# Patient Record
Sex: FEMALE | Race: BLACK OR AFRICAN AMERICAN | Employment: OTHER | URBAN - METROPOLITAN AREA
[De-identification: names, ages, dates, MRNs, and addresses within clinical notes are randomized per-mention and may not be internally consistent; named-entity substitution may affect disease eponyms.]

---

## 2017-01-18 ENCOUNTER — OFFICE VISIT (OUTPATIENT)
Dept: INTERNAL MEDICINE CLINIC | Age: 67
End: 2017-01-18

## 2017-01-18 VITALS
HEIGHT: 64 IN | RESPIRATION RATE: 18 BRPM | OXYGEN SATURATION: 99 % | SYSTOLIC BLOOD PRESSURE: 126 MMHG | HEART RATE: 70 BPM | DIASTOLIC BLOOD PRESSURE: 74 MMHG | BODY MASS INDEX: 36.37 KG/M2 | TEMPERATURE: 98 F | WEIGHT: 213 LBS

## 2017-01-18 DIAGNOSIS — E04.2 MULTINODULAR NON-TOXIC GOITER: ICD-10-CM

## 2017-01-18 DIAGNOSIS — Z23 NEED FOR ZOSTER VACCINE: ICD-10-CM

## 2017-01-18 DIAGNOSIS — Z00.00 MEDICARE ANNUAL WELLNESS VISIT, SUBSEQUENT: Primary | ICD-10-CM

## 2017-01-18 DIAGNOSIS — I10 ESSENTIAL HYPERTENSION: ICD-10-CM

## 2017-01-18 DIAGNOSIS — Z23 NEED FOR PNEUMOCOCCAL VACCINATION: ICD-10-CM

## 2017-01-18 NOTE — PATIENT INSTRUCTIONS
It was a pleasure to see you! As discussed:    I have ordered your age appropriate labs please complete them. You will need to fast 10-12hrs before your appointment. Advance Care Planning  As discussed in your appointment today, Advance Care Planning is an important part of planning for your healthcare future. Discussing your preferences with your family and your care team is a part of good healthcare so that we can be guided by your known values and goals. Our office offers this service at no cost to you. Our Nurse Navigators and certified Respecting Choices ® Facilitators, Janki Huynh and Ray Herrera typically schedule family appointments for this service on Wednesdays. To schedule an Advance Care Planning visit or to receive more information about this service, please call [facility] at [phone] and ask to speak directly to Ivan Downey or [employer]. Complete Tetanus and Shingles vaccine- prescription given. Well Visit, Over 72: Care Instructions  Your Care Instructions  Physical exams can help you stay healthy. Your doctor has checked your overall health and may have suggested ways to take good care of yourself. He or she also may have recommended tests. At home, you can help prevent illness with healthy eating, regular exercise, and other steps. Follow-up care is a key part of your treatment and safety. Be sure to make and go to all appointments, and call your doctor if you are having problems. It's also a good idea to know your test results and keep a list of the medicines you take. How can you care for yourself at home? · Reach and stay at a healthy weight. This will lower your risk for many problems, such as obesity, diabetes, heart disease, and high blood pressure. · Get at least 30 minutes of exercise on most days of the week. Walking is a good choice.  You also may want to do other activities, such as running, swimming, cycling, or playing tennis or team sports. · Do not smoke. Smoking can make health problems worse. If you need help quitting, talk to your doctor about stop-smoking programs and medicines. These can increase your chances of quitting for good. · Protect your skin from too much sun. When you're outdoors from 10 a.m. to 4 p.m., stay in the shade or cover up with clothing and a hat with a wide brim. Wear sunglasses that block UV rays. Even when it's cloudy, put broad-spectrum sunscreen (SPF 30 or higher) on any exposed skin. · See a dentist one or two times a year for checkups and to have your teeth cleaned. · Wear a seat belt in the car. · Limit alcohol to 2 drinks a day for men and 1 drink a day for women. Too much alcohol can cause health problems. Follow your doctor's advice about when to have certain tests. These tests can spot problems early. For men and women  · Cholesterol. Your doctor will tell you how often to have this done based on your overall health and other things that can increase your risk for heart attack and stroke. · Blood pressure. Have your blood pressure checked during a routine doctor visit. Your doctor will tell you how often to check your blood pressure based on your age, your blood pressure results, and other factors. · Diabetes. Ask your doctor whether you should have tests for diabetes. · Vision. Experts recommend that you have yearly exams for glaucoma and other age-related eye problems. · Hearing. Tell your doctor if you notice any change in your hearing. You can have tests to find out how well you hear. · Colon cancer tests. Keep having colon cancer tests as your doctor recommends. You can have one of several types of tests. · Heart attack and stroke risk. At least every 4 to 6 years, you should have your risk for heart attack and stroke assessed.  Your doctor uses factors such as your age, blood pressure, cholesterol, and whether you smoke or have diabetes to show what your risk for a heart attack or stroke is over the next 10 years. · Osteoporosis. Talk to your doctor about whether you should have a bone density test to find out whether you have thinning bones. Also ask your doctor about whether you should take calcium and vitamin D supplements. For women  · Pap test and pelvic exam. You may no longer need a Pap test. Talk with your doctor about whether to stop or continue to have Pap tests. · Breast exam and mammogram. Ask how often you should have a mammogram, which is an X-ray of your breasts. A mammogram can spot breast cancer before it can be felt and when it is easiest to treat. · Thyroid disease. Talk to your doctor about whether to have your thyroid checked as part of a regular physical exam. Women have an increased chance of a thyroid problem. For men  · Prostate exam. Talk to your doctor about whether you should have a blood test (called a PSA test) for prostate cancer. Experts disagree on whether men should have this test. Some experts recommend that you discuss the benefits and risks of the test with your doctor. · Abdominal aortic aneurysm. Ask your doctor whether you should have a test to check for an aneurysm. You may need a test if you ever smoked or if your parent, brother, sister, or child has had an aneurysm. When should you call for help? Watch closely for changes in your health, and be sure to contact your doctor if you have any problems or symptoms that concern you. Where can you learn more? Go to http://roman-kimberly.info/. Enter D611 in the search box to learn more about \"Well Visit, Over 65: Care Instructions. \"  Current as of: July 19, 2016  Content Version: 11.1  © 0347-2592 [employer]. Care instructions adapted under license by [employer] (which disclaims liability or warranty for this information).  If you have questions about a medical condition or this instruction, always ask your healthcare professional. Anita Garcia disclaims any warranty or liability for your use of this information.

## 2017-01-18 NOTE — PROGRESS NOTES
HISTORY OF PRESENT ILLNESS  Eulalio Armijo is a 78 y.o. female. HPI  Cardiovascular Review:  The patient has hypertension and obesity. Diet and Lifestyle: generally follows a low fat low cholesterol diet, generally follows a low sodium diet, follows a diabetic diet regularly, exercises sporadically, nonsmoker, limited due to knee pain  Home BP Monitoring: is not measured at home. Pertinent ROS: taking medications as instructed, no medication side effects noted, no TIA's, no chest pain on exertion, no dyspnea on exertion, no swelling of ankles. Interval History  Saw Periodonist   Saw Orthopedics. SHx: Returned from [place]. This is a Subsequent Medicare Annual Wellness Visit providing Personalized Prevention Plan Services (PPPS) (Performed 12 months after initial AWV and PPPS )    I have reviewed the patient's medical history in detail and updated the computerized patient record. History     Past Medical History   Diagnosis Date    Hypertension     Thyroid nodule      annual eval with US      No past surgical history on file. Current Outpatient Prescriptions   Medication Sig Dispense Refill    triamterene-hydrochlorothiazide (MAXZIDE) 37.5-25 mg per tablet TAKE 1 TABLET BY MOUTH TWICE A  Tab 1    omega-3 fatty acids-vitamin e 1,000 mg cap Take 1 Cap by mouth.  OTHER \"tumeric\"      CALCIUM CARBONATE/VITAMIN D3 (CALCIUM + D PO) Take  by mouth daily.  multivitamin (ONE A DAY) tablet Take 1 Tab by mouth daily.        No Known Allergies  Family History   Problem Relation Age of Onset    Cancer Mother      Colon CA    Hypertension Mother     Heart Disease Father     Hypertension Father     Cancer Sister      Colon CA    Hypertension Sister     Cancer Brother      Esophageal    Hypertension Brother     Cancer Brother      Multiple myeloma    Hypertension Brother     Heart Disease Brother     Hypertension Brother     Hypertension Brother      Social History   Substance Use Topics    Smoking status: Never Smoker    Smokeless tobacco: Never Used    Alcohol use 1.0 oz/week     2 Standard drinks or equivalent per week     Patient Active Problem List   Diagnosis Code    Hypertension I10    Multinodular non-toxic goiter E04.2    Obesity E66.9    Encounter for colonoscopy due to history of adenomatous colonic polyps Z12.11, Z86.010    Change in bowel habits R19.4       Depression Risk Factor Screening:     PHQ 2 / 9, over the last two weeks 1/18/2017   Little interest or pleasure in doing things Not at all   Feeling down, depressed or hopeless Not at all   Total Score PHQ 2 0     Alcohol Risk Factor Screening: On any occasion during the past 3 months, have you had more than 3 drinks containing alcohol? Yes    Do you average more than 7 drinks per week? No      Functional Ability and Level of Safety:     Hearing Loss   none    Activities of Daily Living   Self-care. Requires assistance with: no ADLs    Fall Risk     Fall Risk Assessment, last 12 mths 1/18/2017   Able to walk? Yes   Fall in past 12 months? No     Abuse Screen   Patient is not abused      ROS    Physical Exam   Constitutional: She is oriented to person, place, and time. She appears well-developed and well-nourished. HENT:   Right Ear: External ear normal.   Left Ear: External ear normal.   Mouth/Throat: Oropharynx is clear and moist. No oropharyngeal exudate. Eyes: Conjunctivae are normal. No scleral icterus. Neck: Normal range of motion. Neck supple. No thyromegaly present. Cardiovascular: Normal rate, regular rhythm and normal heart sounds. Exam reveals no gallop and no friction rub. No murmur heard. Pulmonary/Chest: Effort normal and breath sounds normal. No respiratory distress. She has no wheezes. She has no rales. She exhibits no tenderness. Abdominal: Soft. Bowel sounds are normal. She exhibits no distension. There is no tenderness. There is no rebound and no guarding.    Genitourinary: Genitourinary Comments: Not indicated    Musculoskeletal: Normal range of motion. She exhibits edema (LLE trace edema pretibal (chronic, improved) no calf ttp). She exhibits no tenderness. Neurological: She is alert and oriented to person, place, and time. Evaluation of Cognitive Function:  Mood/affect:  happy  Appearance: age appropriate  Family member/caregiver input: n/a         Patient Care Team:  Jennett Lanes, MD as PCP - General (Internal Medicine)    ASSESSMENT and PLAN  Advice/Referrals/Counseling   Education and counseling provided:  Health Maintenance   Topic Date Due    ZOSTER VACCINE AGE 60>  08/02/2009    Pneumococcal 65+ Low/Medium Risk (1 of 2 - PCV13) 08/02/2014    INFLUENZA AGE 9 TO ADULT  08/01/2016    GLAUCOMA SCREENING Q2Y  11/12/2017    MEDICARE YEARLY EXAM  01/19/2018    BREAST CANCER SCRN MAMMOGRAM  09/22/2018    COLONOSCOPY  03/30/2021    DTaP/Tdap/Td series (2 - Td) 04/05/2026    Hepatitis C Screening  Completed    OSTEOPOROSIS SCREENING (DEXA)  Completed       Are appropriate based on today's review and evaluation  End-of-Life planning (with patient's consent)    Assessment/Plan   Katie Holt was seen today for hypertension. Diagnoses and all orders for this visit:    Medicare annual wellness visit, subsequent  -     CBC WITH AUTOMATED DIFF  -     HEMOGLOBIN A1C WITH EAG  -     LIPID PANEL  -     METABOLIC PANEL, COMPREHENSIVE  -     VITAMIN D, 25 HYDROXY    Need for zoster vaccine  -     varicella zoster vacine live (VARICELLA-ZOSTER VACINE LIVE) 19,400 unit/0.65 mL susr injection; 1 Vial by SubCUTAneous route once for 1 dose. Need for pneumococcal vaccination  -     Discontinue: pneumococcal 13 henri conj dip (PREVNAR-13) 0.5 mL syrg injection; 0.5 mL by IntraMUSCular route once for 1 dose. -     pneumococcal 13 henri conj dip (PREVNAR-13) 0.5 mL syrg injection; 0.5 mL by IntraMUSCular route once for 1 dose.     Multinodular non-toxic goiter  -     THYROID PANEL  - TSH 3RD GENERATION    Essential hypertension  -     HEMOGLOBIN A1C WITH EAG  -     LIPID PANEL      Follow-up Disposition: Not on File    Medication risks/benefits/costs/interactions/alternatives discussed with patient. Samantharyan Moscoso  was given an after visit summary which includes diagnoses, current medications, & vitals. she expressed understanding with the diagnosis and plan.

## 2017-01-18 NOTE — ACP (ADVANCE CARE PLANNING)
Advance Care Planning (ACP) Provider Note - Comprehensive     Date of ACP Conversation: 01/18/17  Persons included in Conversation:  patient  Length of ACP Conversation in minutes:  16 minutes    Authorized Decision Maker (if patient is incapable of making informed decisions):    This person is:  More Mendoza (1st) if Mr. Danika Venegas is unavailable then daughter, Jasbir Jacome for ALL Patients with Decision Making Capacity:   Importance of advance care planning, including choosing a healthcare agent to communicate patient's healthcare decisions if patient lost the ability to make decisions, such as after a sudden illness or accident    Review of Existing Advance Directive:  Document unavailable but she will bring it to office     For Serious or Chronic Illness:  Understanding of medical condition      Interventions Provided:  Recommended review of completed ACP document annually or upon change in health status Currently Full Code

## 2017-03-13 ENCOUNTER — HOSPITAL ENCOUNTER (OUTPATIENT)
Dept: LAB | Age: 67
Discharge: HOME | End: 2017-03-13
Payer: MEDICARE

## 2017-03-13 PROCEDURE — 80053 COMPREHEN METABOLIC PANEL: CPT

## 2017-03-13 PROCEDURE — 80061 LIPID PANEL: CPT

## 2017-03-13 PROCEDURE — 85025 COMPLETE CBC W/AUTO DIFF WBC: CPT

## 2017-03-13 PROCEDURE — 84443 ASSAY THYROID STIM HORMONE: CPT

## 2017-03-13 PROCEDURE — 83036 HEMOGLOBIN GLYCOSYLATED A1C: CPT

## 2017-03-13 PROCEDURE — 36415 COLL VENOUS BLD VENIPUNCTURE: CPT

## 2017-03-13 PROCEDURE — 84436 ASSAY OF TOTAL THYROXINE: CPT

## 2017-03-13 PROCEDURE — 82306 VITAMIN D 25 HYDROXY: CPT

## 2017-03-14 LAB
25(OH)D3+25(OH)D2 SERPL-MCNC: 32.1 NG/ML (ref 30–100)
ALBUMIN SERPL-MCNC: 4 G/DL (ref 3.6–4.8)
ALBUMIN/GLOB SERPL: 1.3 {RATIO} (ref 1.2–2.2)
ALP SERPL-CCNC: 90 IU/L (ref 39–117)
ALT SERPL-CCNC: 13 IU/L (ref 0–32)
AST SERPL-CCNC: 15 IU/L (ref 0–40)
BASOPHILS # BLD AUTO: 0 X10E3/UL (ref 0–0.2)
BASOPHILS NFR BLD AUTO: 1 %
BILIRUB SERPL-MCNC: 0.6 MG/DL (ref 0–1.2)
BUN SERPL-MCNC: 15 MG/DL (ref 8–27)
BUN/CREAT SERPL: 18 (ref 11–26)
CALCIUM SERPL-MCNC: 9.3 MG/DL (ref 8.7–10.3)
CHLORIDE SERPL-SCNC: 100 MMOL/L (ref 96–106)
CHOLEST SERPL-MCNC: 181 MG/DL (ref 100–199)
CO2 SERPL-SCNC: 28 MMOL/L (ref 18–29)
CREAT SERPL-MCNC: 0.84 MG/DL (ref 0.57–1)
EOSINOPHIL # BLD AUTO: 0.1 X10E3/UL (ref 0–0.4)
EOSINOPHIL NFR BLD AUTO: 1 %
ERYTHROCYTE [DISTWIDTH] IN BLOOD BY AUTOMATED COUNT: 14 % (ref 12.3–15.4)
EST. AVERAGE GLUCOSE BLD GHB EST-MCNC: 120 MG/DL
FT4I SERPL CALC-MCNC: 2.2 (ref 1.2–4.9)
GLOBULIN SER CALC-MCNC: 3 G/DL (ref 1.5–4.5)
GLUCOSE SERPL-MCNC: 94 MG/DL (ref 65–99)
HBA1C MFR BLD: 5.8 % (ref 4.8–5.6)
HCT VFR BLD AUTO: 39 % (ref 34–46.6)
HDLC SERPL-MCNC: 76 MG/DL
HGB BLD-MCNC: 12.1 G/DL (ref 11.1–15.9)
IMM GRANULOCYTES # BLD: 0 X10E3/UL (ref 0–0.1)
IMM GRANULOCYTES NFR BLD: 0 %
LDLC SERPL CALC-MCNC: 87 MG/DL (ref 0–99)
LYMPHOCYTES # BLD AUTO: 2.2 X10E3/UL (ref 0.7–3.1)
LYMPHOCYTES NFR BLD AUTO: 38 %
MCH RBC QN AUTO: 25.7 PG (ref 26.6–33)
MCHC RBC AUTO-ENTMCNC: 31 G/DL (ref 31.5–35.7)
MCV RBC AUTO: 83 FL (ref 79–97)
MONOCYTES # BLD AUTO: 0.5 X10E3/UL (ref 0.1–0.9)
MONOCYTES NFR BLD AUTO: 9 %
NEUTROPHILS # BLD AUTO: 3 X10E3/UL (ref 1.4–7)
NEUTROPHILS NFR BLD AUTO: 51 %
PLATELET # BLD AUTO: 229 X10E3/UL (ref 150–379)
POTASSIUM SERPL-SCNC: 3.9 MMOL/L (ref 3.5–5.2)
PROT SERPL-MCNC: 7 G/DL (ref 6–8.5)
RBC # BLD AUTO: 4.71 X10E6/UL (ref 3.77–5.28)
SODIUM SERPL-SCNC: 143 MMOL/L (ref 134–144)
T3RU NFR SERPL: 26 % (ref 24–39)
T4 SERPL-MCNC: 8.6 UG/DL (ref 4.5–12)
TRIGL SERPL-MCNC: 92 MG/DL (ref 0–149)
TSH SERPL DL<=0.005 MIU/L-ACNC: 2 UIU/ML (ref 0.45–4.5)
VLDLC SERPL CALC-MCNC: 18 MG/DL (ref 5–40)
WBC # BLD AUTO: 5.8 X10E3/UL (ref 3.4–10.8)

## 2017-03-30 ENCOUNTER — TELEPHONE (OUTPATIENT)
Dept: INTERNAL MEDICINE CLINIC | Age: 67
End: 2017-03-30

## 2017-04-30 NOTE — PROGRESS NOTES
Hi Mrs. Frida Castillo you for completing your labs which show:  Your A1c, marker of prediabetes, has improved. Keep up the good work on optimizing your diet and exercise. The remainder of your labs were normal. Some labs that may have been tested and their explanation are:  Your electrolytes, kidney & liver function (Metabolic Panel)   Anemia, blood cells (CBC)  Thyroid (TSH + T4, T3)  Hormones (prolactin, vitamin D )     Do not hesitate to contact the office if you have any questions or concerns before your next appointment.      Kind regards,   Dr. Kari Velásquez

## 2017-05-18 ENCOUNTER — OFFICE VISIT (OUTPATIENT)
Dept: INTERNAL MEDICINE CLINIC | Age: 67
End: 2017-05-18

## 2017-05-18 VITALS
SYSTOLIC BLOOD PRESSURE: 128 MMHG | HEIGHT: 64 IN | WEIGHT: 214.8 LBS | HEART RATE: 86 BPM | BODY MASS INDEX: 36.67 KG/M2 | DIASTOLIC BLOOD PRESSURE: 80 MMHG | OXYGEN SATURATION: 96 % | TEMPERATURE: 97.4 F | RESPIRATION RATE: 18 BRPM

## 2017-05-18 DIAGNOSIS — E04.2 MULTINODULAR NON-TOXIC GOITER: ICD-10-CM

## 2017-05-18 DIAGNOSIS — E66.9 OBESITY (BMI 30-39.9): ICD-10-CM

## 2017-05-18 DIAGNOSIS — R73.9 HYPERGLYCEMIA: ICD-10-CM

## 2017-05-18 DIAGNOSIS — I10 ESSENTIAL HYPERTENSION: Primary | ICD-10-CM

## 2017-05-18 DIAGNOSIS — K06.9 GUM DISEASE: ICD-10-CM

## 2017-05-18 RX ORDER — AMOXICILLIN 500 MG/1
CAPSULE ORAL
Refills: 0 | COMMUNITY
Start: 2017-05-11 | End: 2017-09-21

## 2017-05-18 NOTE — PATIENT INSTRUCTIONS
It was a pleasure to see you! As discussed:    I have ordered your age appropriate labs please complete them. You will need to fast 10-12hrs before your lab appointment. Complete labs two weeks before your next appointment. High Blood Pressure (BP)  Well controlled today  -Continue to check your BP at home   -Follow a low sodium diet (<1500mg/ day)   -Exercise regularly goal, 150 minutes of cardiovascular exercise/ week  -If your blood pressure is too low (<90/60) or too high (>180/100) or you have any symptoms such as chest pain, dizziness, shortness of breath- seek immediate medical attention. See  Www.health.gov for more information. Three Goals (Big Goal: at least  3-5 lbs weight loss by next visit)  1. Wear fitness tracker, supplement physical activity- goal 03754 steps/ day  2. Decrease carbohydrates to 150-200g/ day. 3. Recommended \"Diets\"  Choose a healthy eating plan that works best for you. Some ideas are:  Mediterranean Diet: [email or web address]  Low Carb Diet: Govtoday.nl  Fast Metabolism Diet: http://4INFO. DabKick/books/the-fast-metabolism-diet/       Mediterranean Diet: Care Instructions  Your Care Instructions  The Mediterranean diet features foods eaten in [place], [place], [place], and other countries that border the [facility]. It emphasizes eating a diet rich in fruits, vegetables, nuts, and high-fiber grains, and limits meat, cheese, and sweets. The Mediterranean diet may:  · Prevent heart disease and lower the risk of a heart attack or stroke. · Prevent type 2 diabetes. · Prevent Alzheimer's disease and other dementia. · Prevent depression. · Prevent Parkinson's disease. This diet contains more fat than other heart-healthy diets.  But the fats are mainly from nuts, unsaturated oils, such as fish oils, olive oil, and certain nut or seed oils (such as canola, soybean, or flaxseed oil). These types of oils may help protect the heart and blood vessels. Follow-up care is a key part of your treatment and safety. Be sure to make and go to all appointments, and call your doctor if you are having problems. It's also a good idea to know your test results and keep a list of the medicines you take. How can you care for yourself at home? What to eat  · Eat a variety of fruits and vegetables each day, such as grapes, blueberries, tomatoes, broccoli, peppers, figs, olives, spinach, eggplant, beans, lentils, and chickpeas. · Eat a variety of whole-grain foods each day, such as oats, brown rice, and whole wheat bread, pasta, and couscous. · Eat fish at least 2 times a week. Try tuna, salmon, mackerel, lake trout, herring, or sardines. · Eat moderate amounts of low-fat dairy products, such as milk, cheese, or yogurt. · Eat moderate amounts of poultry and eggs. · Choose healthy (unsaturated) fats, such as nuts, olive oil and certain nut or seed oils like canola, soybean, and flaxseed. · Limit unhealthy (saturated) fats, such as butter, palm oil, and coconut oil. And limit fats found in animal products, such as meat and dairy products made with whole milk. Try to eat red meat only a few times a month in very small amounts. · Limit sweets and desserts to only a few times a week. This includes sugar-sweetened drinks like soda. The Mediterranean diet may also include red wine with your meal--1 glass each day for women and up to 2 glasses a day for men. Tips for changing your diet  · Dip bread in a mix of olive oil and fresh herbs instead of using butter. · Add avocado slices to your sandwich instead of rodriguez. · Have fish for lunch or dinner instead of red meat. Brush the fish with olive oil, and broil or grill it. · Sprinkle your salad with seeds or nuts instead of cheese.   · Cook with olive or canola oil instead of butter or oils that are high in saturated fat. · Switch from 2% milk or whole milk to 1% or fat-free milk. · Dip raw vegetables in a vinaigrette dressing or hummus instead of dips made from mayonnaise or sour cream.  · Have a piece of fruit for dessert instead of a piece of cake. Try baked apples, or have some dried fruit. Part of the Mediterranean diet is being active. Get at least 30 minutes of exercise on most days of the week. Walking is a good choice. You also may want to do other activities, such as running, swimming, cycling, or playing tennis or team sports. Where can you learn more? Go to http://roman-kimberly.info/. Enter O407 in the search box to learn more about \"Mediterranean Diet: Care Instructions. \"  Current as of: October 21, 2016  Content Version: 11.2  © 6637-8513 [employer]. Care instructions adapted under license by [employer] (which disclaims liability or warranty for this information). If you have questions about a medical condition or this instruction, always ask your healthcare professional. [place] [address] any warranty or liability for your use of this information. Learning About Carbohydrate  What is carbohydrate? Carbohydrate is an important nutrient you get from food. It's a great source of energy for your body and helps your brain and nervous system work properly. How does your body use carbohydrate? After you eat food with carbs in it, your body digests the carbohydrate and turns it into a kind of sugar that goes into your blood. The blood carries this sugar to the cells in your body. The cells use the sugar to give you energy. Extra sugar is stored in the cells for later use. If it isn't used, it turns into fat. Where does carbohydrate come from?   The healthiest carbohydrate choices are breads, cereals, and pastas made with whole grains; brown rice; low-fat dairy products; vegetables; legumes such as peas, lentils, and beans; and fruits. Foods made from refined flour, including bread, pasta, doughnuts, cookies, and desserts, also contain carbohydrate. So do sweets such as candy and soda. How can you get the right kind and amount of carbs? Eating too much of anything can lead to weight gain. And that can lead to other health problems. Here are some tips to help you eat the right amount of the right kind of carbs so you have the nutrition and the energy you need:  · Eat 3 to 8 servings of grains (breads, cereals, rice, pasta) each day. For example, a serving is 1 slice of bread, 1 cup of boxed cereal, or ½ cup of cooked rice, cooked pasta, or cooked cereal. Go to www. choosemyplate.gov to learn how many servings you need. ¨ Buy bread that lists whole wheat (or other whole grains), stone-ground wheat, or cracked wheat as the first ingredient. ¨ Eat brown rice, bulgur, or millet instead of white rice. ¨ Eat pasta and cereals made from whole grain flour instead of refined flour. · Eat several servings a day of fresh fruits and vegetables. These include raspberries, apples, figs, oranges, pears, prunes, broccoli, brussels sprouts, carrots, corn, peas, and beans. And there are lots of other fruits and vegetables to choose from. · Limit the amount of candy, desserts, and soda in your diet. Where can you learn more? Go to http://roman-kimberly.info/. Enter F199 in the search box to learn more about \"Learning About Carbohydrate. \"  Current as of: July 26, 2016  Content Version: 11.2  © 3910-8699 [employer]. Care instructions adapted under license by [employer] (which disclaims liability or warranty for this information). If you have questions about a medical condition or this instruction, always ask your healthcare professional. [address] any warranty or liability for your use of this information.

## 2017-05-18 NOTE — PROGRESS NOTES
HISTORY OF PRESENT ILLNESS  Raya Germain is a 78 y.o. female. HPI  Cardiovascular Review:  The patient has hypertension and obesity Body mass index is 36.87 kg/(m^2). Diet and Lifestyle: not attempting to follow a low sodium diet, nonsmoker  Home BP Monitoring: is not measured at home. Pertinent ROS: taking medications as instructed, no medication side effects noted, no TIA's, no chest pain on exertion, no dyspnea on exertion, swelling of ankles. Multinodular Goiter   Patient is seen by [facility]  for followup of multinodular goiter  Has been seen in the past 3 months  and had TSH checked. US checked. Records reviewed. Thyroid ROS: denies fatigue, weight changes, heat/cold intolerance, bowel/skin changes or CVS symptoms. Shx: Daughter traveling to [place] , [facility] coming to visit 3.5 and 9 yo. Review of Systems   Constitutional: Negative for diaphoresis, fever and weight loss. HENT:        Had gum surgery    Eyes: Negative for blurred vision and pain. Respiratory: Negative for shortness of breath. Cardiovascular: Negative for chest pain, orthopnea and leg swelling. Musculoskeletal: Positive for joint pain (left knee, hard cortisone injection in Fall 2016 ). Neurological: Negative for focal weakness and headaches. Psychiatric/Behavioral: Negative for depression. Patient Active Problem List    Diagnosis Date Noted    Encounter for colonoscopy due to history of adenomatous colonic polyps 03/30/2016    Change in bowel habits 03/30/2016    Obesity 06/15/2015    Hypertension 12/02/2014    Multinodular non-toxic goiter 12/02/2014       Current Outpatient Prescriptions   Medication Sig Dispense Refill    amoxicillin (AMOXIL) 500 mg capsule TAKE ONE CAPSULE BY MOUTH 3 TIMES A DAY FOR 7 DAYS  0    triamterene-hydrochlorothiazide (MAXZIDE) 37.5-25 mg per tablet TAKE 1 TABLET BY MOUTH TWICE A  Tab 1    omega-3 fatty acids-vitamin e 1,000 mg cap Take 1 Cap by mouth.  OTHER \"tumeric\"      CALCIUM CARBONATE/VITAMIN D3 (CALCIUM + D PO) Take  by mouth daily.  multivitamin (ONE A DAY) tablet Take 1 Tab by mouth daily. No Known Allergies   Visit Vitals    /80 (BP 1 Location: Right arm, BP Patient Position: Sitting)    Pulse 86    Temp 97.4 °F (36.3 °C) (Oral)    Resp 18    Ht 5' 4\" (1.626 m)    Wt 214 lb 12.8 oz (97.4 kg)    SpO2 96%    BMI 36.87 kg/m2       Physical Exam    Lab Results  Component Value Date/Time   Hemoglobin A1c 5.8 03/13/2017 09:19 AM   Hemoglobin A1c 6.0 04/06/2016 07:57 AM   Hemoglobin A1c 5.8 01/11/2010 10:53 AM   Glucose 94 03/13/2017 09:19 AM   LDL, calculated 87 03/13/2017 09:19 AM   Creatinine 0.84 03/13/2017 09:19 AM      Lab Results  Component Value Date/Time   Cholesterol, total 181 03/13/2017 09:19 AM   HDL Cholesterol 76 03/13/2017 09:19 AM   LDL, calculated 87 03/13/2017 09:19 AM   Triglyceride 92 03/13/2017 09:19 AM   CHOL/HDL Ratio 2.6 01/11/2010 10:53 AM       Lab Results  Component Value Date/Time   ALT (SGPT) 13 03/13/2017 09:19 AM   AST (SGOT) 15 03/13/2017 09:19 AM   Alk. phosphatase 90 03/13/2017 09:19 AM   Bilirubin, total 0.6 03/13/2017 09:19 AM       Lab Results  Component Value Date/Time   GFR est AA 83 03/13/2017 09:19 AM   GFR est non-AA 72 03/13/2017 09:19 AM   Creatinine 0.84 03/13/2017 09:19 AM   BUN 15 03/13/2017 09:19 AM   Sodium 143 03/13/2017 09:19 AM   Potassium 3.9 03/13/2017 09:19 AM   Chloride 100 03/13/2017 09:19 AM   CO2 28 03/13/2017 09:19 AM      Lab Results  Component Value Date/Time   TSH 2.000 03/13/2017 09:19 AM   T3 Uptake 26 03/13/2017 09:19 AM   T4, Free 1.24 03/20/2015 10:38 AM   T4, Total 8.6 03/13/2017 09:19 AM      Lab Results   Component Value Date/Time    Glucose 94 03/13/2017 09:19 AM         ASSESSMENT and PLAN  Dee Blair was seen today for hypertension, knee swelling and ankle swelling. Diagnoses and all orders for this visit:    Essential hypertension- well controlled. Counseled on diet and exercise. Continue current regimen.   -     METABOLIC PANEL, COMPREHENSIVE  -     LIPID PANEL    Multinodular non-toxic goiter- per Endocrinology     Gum disease- per Periodontist     Hyperglycemia- optimize carbs   -     HEMOGLOBIN A1C WITH EAG    Obesity (BMI 30-39. 9)- I have reviewed/discussed the above normal BMI with the patient. I have recommended the following interventions: dietary management education, guidance, and counseling and encourage exercise . See AVS for full details of plan and patient discussion. .          Follow-up Disposition:  Return in about 4 months (around 9/18/2017) for Follow-up, Hypertension. Medication risks/benefits/costs/interactions/alternatives discussed with patient. Inder Beck  was given an after visit summary which includes diagnoses, current medications, & vitals. she expressed understanding with the diagnosis and plan.

## 2017-05-18 NOTE — MR AVS SNAPSHOT
Visit Information Date & Time Provider Department Dept. Phone Encounter #  
 5/18/2017  8:30 AM Raquel Castro MD [facility] [phone] [id number] Follow-up Instructions Return in about 4 months (around 9/18/2017) for Follow-up, Hypertension. Upcoming Health Maintenance Date Due INFLUENZA AGE 9 TO ADULT 8/1/2017 GLAUCOMA SCREENING Q2Y 11/12/2017 MEDICARE YEARLY EXAM 1/19/2018 Pneumococcal 65+ Low/Medium Risk (2 of 2 - PPSV23) 5/17/2018 BREAST CANCER SCRN MAMMOGRAM 9/22/2018 COLONOSCOPY 3/30/2021 DTaP/Tdap/Td series (2 - Td) 4/5/2026 Allergies as of 5/18/2017  Review Complete On: 5/18/2017 By: Raquel Castro MD  
 No Known Allergies Current Immunizations  Never Reviewed Name Date Influenza High Dose Vaccine PF 10/13/2015 Tdap 4/5/2016 10:30 AM  
  
 Not reviewed this visit You Were Diagnosed With   
  
 Codes Comments Essential hypertension    -  Primary ICD-10-CM: I10 
ICD-9-CM: 401.9 Multinodular non-toxic goiter     ICD-10-CM: E04.2 ICD-9-CM: 241.1 Gum disease     ICD-10-CM: K06.9 ICD-9-CM: 523.9 Hyperglycemia     ICD-10-CM: R73.9 ICD-9-CM: 790.29 Obesity (BMI 30-39. 9)     ICD-10-CM: E66.9 ICD-9-CM: 278.00 Vitals BP Pulse Temp Resp Height(growth percentile) Weight(growth percentile) 128/80 (BP 1 Location: Right arm, BP Patient Position: Sitting) 86 97.4 °F (36.3 °C) (Oral) 18 5' 4\" (1.626 m) 214 lb 12.8 oz (97.4 kg) SpO2 BMI OB Status Smoking Status 96% 36.87 kg/m2 Postmenopausal Never Smoker Vitals History BMI and BSA Data Body Mass Index Body Surface Area  
 36.87 kg/m 2 2.1 m 2 Preferred Pharmacy Pharmacy Name Phone [id number] [place] - [address] [phone] Your Updated Medication List  
  
   
This list is accurate as of: 5/18/17  9:00 AM.  Always use your most recent med list.  
  
  
  
  
 amoxicillin 500 mg capsule Commonly known as:  AMOXIL TAKE ONE CAPSULE BY MOUTH 3 TIMES A DAY FOR 7 DAYS  
  
 CALCIUM + D PO Take  by mouth daily. multivitamin tablet Commonly known as:  ONE A DAY Take 1 Tab by mouth daily. omega-3 fatty acids-vitamin e 1,000 mg Cap Take 1 Cap by mouth. OTHER \"tumeric\"  
  
 triamterene-hydroCHLOROthiazide 37.5-25 mg per tablet Commonly known as:  [facility] 1 TABLET BY MOUTH TWICE A DAY We Performed the Following HEMOGLOBIN A1C WITH EAG [81193 CPT(R)] LIPID PANEL [53253 CPT(R)] METABOLIC PANEL, COMPREHENSIVE [98167 CPT(R)] Follow-up Instructions Return in about 4 months (around 9/18/2017) for Follow-up, Hypertension. Patient Instructions It was a pleasure to see you! As discussed: 
 
I have ordered your age appropriate labs please complete them. You will need to fast 10-12hrs before your lab appointment. Complete labs two weeks before your next appointment. High Blood Pressure (BP) Well controlled today 
-Continue to check your BP at home  
-Follow a low sodium diet (<1500mg/ day) -Exercise regularly goal, 150 minutes of cardiovascular exercise/ week 
-If your blood pressure is too low (<90/60) or too high (>180/100) or you have any symptoms such as chest pain, dizziness, shortness of breath- seek immediate medical attention. See  Www.health.gov for more information. Three Goals (Big Goal: at least  3-5 lbs weight loss by next visit) 1. Wear fitness tracker, supplement physical activity- goal 85764 steps/ day 2. Decrease carbohydrates to 150-200g/ day. 3. Recommended \"Diets\" Choose a healthy eating plan that works best for you. Some ideas are: 
Mediterranean Diet: [email or web address] Low Carb Diet: [email or web address] Fast Metabolism Diet: [email or web address] [email or web address] Software/books/the-fast-metabolism-diet/ 
 
  
Mediterranean Diet: Care Instructions Your Care Instructions The Mediterranean diet features foods eaten in [place], [place], [place] and [place], and other countries that border the . It emphasizes eating a diet rich in fruits, vegetables, nuts, and high-fiber grains, and limits meat, cheese, and sweets. The Mediterranean diet may: · Prevent heart disease and lower the risk of a heart attack or stroke. · Prevent type 2 diabetes. · Prevent Alzheimer's disease and other dementia. · Prevent depression. · Prevent Parkinson's disease. This diet contains more fat than other heart-healthy diets. But the fats are mainly from nuts, unsaturated oils, such as fish oils, olive oil, and certain nut or seed oils (such as canola, soybean, or flaxseed oil). These types of oils may help protect the heart and blood vessels. Follow-up care is a key part of your treatment and safety. Be sure to make and go to all appointments, and call your doctor if you are having problems. It's also a good idea to know your test results and keep a list of the medicines you take. How can you care for yourself at home? What to eat · Eat a variety of fruits and vegetables each day, such as grapes, blueberries, tomatoes, broccoli, peppers, figs, olives, spinach, eggplant, beans, lentils, and chickpeas. · Eat a variety of whole-grain foods each day, such as oats, brown rice, and whole wheat bread, pasta, and couscous. · Eat fish at least 2 times a week. Try tuna, salmon, mackerel, lake trout, herring, or sardines. · Eat moderate amounts of low-fat dairy products, such as milk, cheese, or yogurt. · Eat moderate amounts of poultry and eggs.  
· Choose healthy (unsaturated) fats, such as nuts, olive oil and certain nut or seed oils like canola, soybean, and flaxseed. · Limit unhealthy (saturated) fats, such as butter, palm oil, and coconut oil. And limit fats found in animal products, such as meat and dairy products made with whole milk. Try to eat red meat only a few times a month in very small amounts. · Limit sweets and desserts to only a few times a week. This includes sugar-sweetened drinks like soda. The Mediterranean diet may also include red wine with your meal1 glass each day for women and up to 2 glasses a day for men. Tips for changing your diet · Dip bread in a mix of olive oil and fresh herbs instead of using butter. · Add avocado slices to your sandwich instead of rodriguez. · Have fish for lunch or dinner instead of red meat. Brush the fish with olive oil, and broil or grill it. · Sprinkle your salad with seeds or nuts instead of cheese. · Cook with olive or canola oil instead of butter or oils that are high in saturated fat. · Switch from 2% milk or whole milk to 1% or fat-free milk. · Dip raw vegetables in a vinaigrette dressing or hummus instead of dips made from mayonnaise or sour cream. 
· Have a piece of fruit for dessert instead of a piece of cake. Try baked apples, or have some dried fruit. Part of the Mediterranean diet is being active. Get at least 30 minutes of exercise on most days of the week. Walking is a good choice. You also may want to do other activities, such as running, swimming, cycling, or playing tennis or team sports. Where can you learn more? Go to http://roman-kimberly.info/. Enter O407 in the search box to learn more about \"Mediterranean Diet: Care Instructions. \" Current as of: October 21, 2016 Content Version: 11.2 © 0994-5084 [employer]. Care instructions adapted under license by [employer] (which disclaims liability or warranty for this information).  If you have questions about a medical condition or this instruction, always ask your healthcare professional. [place] [address] any warranty or liability for your use of this information. Learning About Carbohydrate What is carbohydrate? Carbohydrate is an important nutrient you get from food. It's a great source of energy for your body and helps your brain and nervous system work properly. How does your body use carbohydrate? After you eat food with carbs in it, your body digests the carbohydrate and turns it into a kind of sugar that goes into your blood. The blood carries this sugar to the cells in your body. The cells use the sugar to give you energy. Extra sugar is stored in the cells for later use. If it isn't used, it turns into fat. Where does carbohydrate come from? The healthiest carbohydrate choices are breads, cereals, and pastas made with whole grains; brown rice; low-fat dairy products; vegetables; legumes such as peas, lentils, and beans; and fruits. Foods made from refined flour, including bread, pasta, doughnuts, cookies, and desserts, also contain carbohydrate. So do sweets such as candy and soda. How can you get the right kind and amount of carbs? Eating too much of anything can lead to weight gain. And that can lead to other health problems. Here are some tips to help you eat the right amount of the right kind of carbs so you have the nutrition and the energy you need: 
· Eat 3 to 8 servings of grains (breads, cereals, rice, pasta) each day. For example, a serving is 1 slice of bread, 1 cup of boxed cereal, or ½ cup of cooked rice, cooked pasta, or cooked cereal. Go to www. choosemyplate.gov to learn how many servings you need. ¨ Buy bread that lists whole wheat (or other whole grains), stone-ground wheat, or cracked wheat as the first ingredient. ¨ Eat brown rice, bulgur, or millet instead of white rice. ¨ Eat pasta and cereals made from whole grain flour instead of refined flour. · Eat several servings a day of fresh fruits and vegetables. These include raspberries, apples, figs, oranges, pears, prunes, broccoli, brussels sprouts, carrots, corn, peas, and beans. And there are lots of other fruits and vegetables to choose from. · Limit the amount of candy, desserts, and soda in your diet. Where can you learn more? Go to http://roman-kimberly.info/. Enter F199 in the search box to learn more about \"Learning About Carbohydrate. \" Current as of: July 26, 2016 Content Version: 11.2 © 8500-2898 [employer]. Care instructions adapted under license by [employer] (which disclaims liability or warranty for this information). If you have questions about a medical condition or this instruction, always ask your healthcare professional. [address] any warranty or liability for your use of this information. Introducing [facility]! Dear Trixie Pickett: Thank you for requesting a Selltag account. Our records indicate that you already have an active Selltag account. You can access your account anytime at [email or web address]. [email or web address] Did you know that you can access your hospital and ER discharge instructions at any time in Selltag? You can also review all of your test results from your hospital stay or ER visit. Additional Information If you have questions, please visit the Frequently Asked Questions section of the Selltag website at [email or web address] Remember, Selltag is NOT to be used for urgent needs. For medical emergencies, dial 911. Now available from your iPhone and Android! Please provide this summary of care documentation to your next provider. Your primary care clinician is listed as Colon Kory. If you have any questions after today's visit, please call [phone].

## 2017-07-03 RX ORDER — TRIAMTERENE/HYDROCHLOROTHIAZID 37.5-25 MG
TABLET ORAL
Qty: 180 TAB | Refills: 1 | Status: SHIPPED | OUTPATIENT
Start: 2017-07-03 | End: 2018-08-14 | Stop reason: SDUPTHER

## 2017-09-14 ENCOUNTER — HOSPITAL ENCOUNTER (OUTPATIENT)
Dept: LAB | Age: 67
Discharge: HOME | End: 2017-09-14
Payer: MEDICARE

## 2017-09-14 PROCEDURE — 80061 LIPID PANEL: CPT

## 2017-09-14 PROCEDURE — 83036 HEMOGLOBIN GLYCOSYLATED A1C: CPT

## 2017-09-14 PROCEDURE — 36415 COLL VENOUS BLD VENIPUNCTURE: CPT

## 2017-09-14 PROCEDURE — 80053 COMPREHEN METABOLIC PANEL: CPT

## 2017-09-15 LAB
ALBUMIN SERPL-MCNC: 4.1 G/DL (ref 3.6–4.8)
ALBUMIN/GLOB SERPL: 1.3 {RATIO} (ref 1.2–2.2)
ALP SERPL-CCNC: 94 IU/L (ref 39–117)
ALT SERPL-CCNC: 11 IU/L (ref 0–32)
AST SERPL-CCNC: 17 IU/L (ref 0–40)
BILIRUB SERPL-MCNC: 0.5 MG/DL (ref 0–1.2)
BUN SERPL-MCNC: 16 MG/DL (ref 8–27)
BUN/CREAT SERPL: 16 (ref 12–28)
CALCIUM SERPL-MCNC: 9.7 MG/DL (ref 8.7–10.3)
CHLORIDE SERPL-SCNC: 100 MMOL/L (ref 96–106)
CHOLEST SERPL-MCNC: 168 MG/DL (ref 100–199)
CO2 SERPL-SCNC: 29 MMOL/L (ref 18–29)
CREAT SERPL-MCNC: 0.99 MG/DL (ref 0.57–1)
EST. AVERAGE GLUCOSE BLD GHB EST-MCNC: 120 MG/DL
GLOBULIN SER CALC-MCNC: 3.1 G/DL (ref 1.5–4.5)
GLUCOSE SERPL-MCNC: 90 MG/DL (ref 65–99)
HBA1C MFR BLD: 5.8 % (ref 4.8–5.6)
HDLC SERPL-MCNC: 74 MG/DL
LDLC SERPL CALC-MCNC: 80 MG/DL (ref 0–99)
POTASSIUM SERPL-SCNC: 4 MMOL/L (ref 3.5–5.2)
PROT SERPL-MCNC: 7.2 G/DL (ref 6–8.5)
SODIUM SERPL-SCNC: 143 MMOL/L (ref 134–144)
TRIGL SERPL-MCNC: 72 MG/DL (ref 0–149)
VLDLC SERPL CALC-MCNC: 14 MG/DL (ref 5–40)

## 2017-09-21 ENCOUNTER — OFFICE VISIT (OUTPATIENT)
Dept: INTERNAL MEDICINE CLINIC | Age: 67
End: 2017-09-21

## 2017-09-21 VITALS
TEMPERATURE: 97.7 F | WEIGHT: 208.2 LBS | BODY MASS INDEX: 35.55 KG/M2 | SYSTOLIC BLOOD PRESSURE: 136 MMHG | RESPIRATION RATE: 16 BRPM | OXYGEN SATURATION: 98 % | HEIGHT: 64 IN | HEART RATE: 72 BPM | DIASTOLIC BLOOD PRESSURE: 74 MMHG

## 2017-09-21 DIAGNOSIS — E04.2 MULTINODULAR NON-TOXIC GOITER: ICD-10-CM

## 2017-09-21 DIAGNOSIS — I10 ESSENTIAL HYPERTENSION: Primary | ICD-10-CM

## 2017-09-21 DIAGNOSIS — R09.82 PND (POST-NASAL DRIP): ICD-10-CM

## 2017-09-21 NOTE — PROGRESS NOTES
Chief Complaint   Patient presents with    Hypertension     1. Have you been to the ER, urgent care clinic since your last visit? Hospitalized since your last visit? No    2. Have you seen or consulted any other health care providers outside of the [address] [place] [address] since your last visit? Include any pap smears or colon screening.  No

## 2017-09-21 NOTE — PATIENT INSTRUCTIONS
It was a pleasure to see you! As discussed:    Lab review  Your kidney and liver function are normal. No medication changes are needed  Your cholesterol is well controlled. The remainder of your labs were normal. Some labs that may have been tested and their explanation are:  Your electrolytes, kidney & liver function (Metabolic Panel)   Anemia, blood cells (CBC)  Thyroid (TSH + T4, T3)  Hormones (prolactin, vitamin D )   Pregnancy (Beta HCG)    Diabetes (Hemoglobin A1c)     Post nasal drip  -Use nasal saline spray 3-4 times/day   -Start non sedating antihistamine such as Claritin, Allegra or Zyrtec (generic is fine) in the day;     Nutrition Tips for Diabetes: After Your Visit  Your Care Instructions  A healthy diet is important to manage diabetes. It helps you lose weight (if you need to) and keep it off. It gives you the nutrition and energy your body needs and helps prevent heart disease. But a diet for diabetes does not mean that you have to eat special foods. You can eat what your family eats, including occasional sweets and other favorites. But you do have to pay attention to how often you eat and how much you eat of certain foods. The right plan for you will give you meals that help you keep your blood sugar at healthy levels. Try to eat a variety of foods and to spread carbohydrate throughout the day. Carbohydrate raises blood sugar higher and more quickly than any other nutrient does. Carbohydrate is found in sugar, breads and cereals, fruit, starchy vegetables such as potatoes and corn, and milk and yogurt. You may want to work with a dietitian or diabetes educator to help you plan meals and snacks. A dietitian or diabetes educator also can help you lose weight if that is one of your goals. The following tips can help you enjoy your meals and stay healthy. Follow-up care is a key part of your treatment and safety.  Be sure to make and go to all appointments, and call your doctor if you are having problems. Its also a good idea to know your test results and keep a list of the medicines you take. How can you care for yourself at home? · Learn which foods have carbohydrate and how much carbohydrate to eat. A dietitian or diabetes educator can help you learn to keep track of how much carbohydrate you eat. · Spread carbohydrate throughout the day. Eat some carbohydrate at all meals, but do not eat too much at any one time. · Plan meals to include food from all the food groups. These are the food groups and some example portion sizes:  ¨ Grains: 1 slice of bread (1 ounce), ½ cup of cooked cereal, and 1/3 cup of cooked pasta or rice. These have about 15 grams of carbohydrate in a serving. Choose whole grains such as whole wheat bread or crackers, oatmeal, and brown rice more often than refined grains. ¨ Fruit: 1 small fresh fruit, such as an apple or orange; ½ of a banana; ½ cup of chopped, cooked, or canned fruit; ½ cup of fruit juice; 1 cup of melon or raspberries; and 2 tablespoons of dried fruit. These have about 15 grams of carbohydrate in a serving. ¨ Dairy: 1 cup of nonfat or low-fat milk and 2/3 cup of plain yogurt. These have about 15 grams of carbohydrate in a serving. ¨ Protein foods: Beef, chicken, turkey, fish, eggs, tofu, cheese, cottage cheese, and peanut butter. A serving size of meat is 3 ounces, which is about the size of a deck of cards. Examples of meat substitute serving sizes (equal to 1 ounce of meat) are 1/4 cup of cottage cheese, 1 egg, 1 tablespoon of peanut butter, and ½ cup of tofu. These have very little or no carbohydrate per serving. ¨ Vegetables: Starchy vegetables such as ½ cup of cooked dried beans, peas, potatoes, or corn have about 15 grams of carbohydrate. Nonstarchy vegetables have very little carbohydrate, such as 1 cup of raw leafy vegetables (such as spinach), ½ cup of other vegetables (cooked or chopped), and 3/4 cup of vegetable juice.   · Use the plate format to plan meals. It is a good, quick way to make sure that you have a balanced meal. It also helps you spread carbohydrate throughout the day. You divide your plate by types of foods. Put vegetables on half the plate, meat or meat substitutes on one-quarter of the plate, and a grain or starchy vegetable (such as brown rice or a potato) in the final quarter of the plate. To this you can add a small piece of fruit and 1 cup of milk or yogurt, depending on how much carbohydrate you are supposed to eat at a meal.  · Talk to your dietitian or diabetes educator about ways to add limited amounts of sweets into your meal plan. You can eat these foods now and then, as long as you include the amount of carbohydrate they have in your daily carbohydrate allowance. · If you drink alcohol, limit it to no more than 1 drink a day for women and 2 drinks a day for men. If you are pregnant, no amount of alcohol is known to be safe. · Protein, fat, and fiber do not raise blood sugar as much as carbohydrate does. If you eat a lot of these nutrients in a meal, your blood sugar will rise more slowly than it would otherwise. · Limit saturated fats, such as those from meat and dairy products. Try to replace it with monounsaturated fat, such as olive oil. This is a healthier choice because people who have diabetes are at higher-than-average risk of heart disease. But use a modest amount of olive oil. A tablespoon of olive oil has 14 grams of fat and 120 calories. · Exercise lowers blood sugar. If you take insulin by shots or pump, you can use less than you would if you were not exercising. Keep in mind that timing matters. If you exercise within 1 hour after a meal, your body may need less insulin for that meal than it would if you exercised 3 hours after the meal. Test your blood sugar to find out how exercise affects your need for insulin. · Exercise on most days of the week. Aim for at least 30 minutes.  Exercise helps you stay at a healthy weight and helps your body use insulin. Walking is an easy way to get exercise. Gradually increase the amount you walk every day. You also may want to swim, bike, or do other activities. When you eat out  · Learn to estimate the serving sizes of foods that have carbohydrate. If you measure food at home, it will be easier to estimate the amount in a serving of restaurant food. · If the meal you order has too much carbohydrate (such as potatoes, corn, or baked beans), ask to have a low-carbohydrate food instead. Ask for a salad or green vegetables. · If you use insulin, check your blood sugar before and after eating out to help you plan how much to eat in the future. · If you eat more carbohydrate at a meal than you had planned, take a walk or do other exercise. This will help lower your blood sugar. Where can you learn more? Go to Or[employer].be  Enter U184 in the search box to learn more about \"Nutrition Tips for Diabetes: After Your Visit. \"   © 0025-2347 Healthwise, Incorporated. Care instructions adapted under license by Ruth Headley (which disclaims liability or warranty for this information). This care instruction is for use with your licensed healthcare professional. If you have questions about a medical condition or this instruction, always ask your healthcare professional. [address] any warranty or liability for your use of this information.   Content Version: 35.0.773408; Current as of: June 4, 2014

## 2017-09-21 NOTE — PROGRESS NOTES
HISTORY OF PRESENT ILLNESS  Luisito Soriano is a 75 y.o. female. HPI  Cardiovascular Review:  The patient has hypertension and obesity. Diet and Lifestyle: generally follows a low fat low cholesterol diet, nonsmoker, very active- [place] ([place] of chickens)  Home BP Monitoring: is not measured at home. Not sleeping well due to 's sleep apnea. Pertinent ROS: taking medications as instructed, no medication side effects noted, no TIA's, no chest pain on exertion, no dyspnea on exertion, no swelling of ankles. SHx: [employer] for summer, Going to [place], [place] for Thanksgiving. Review of Systems   Constitutional: Negative for diaphoresis, fever and weight loss. HENT: Positive for congestion. Eyes: Negative for blurred vision and pain. Respiratory: Positive for cough (intermittently ). Negative for shortness of breath. Cardiovascular: Negative for chest pain, orthopnea and leg swelling. Neurological: Negative for focal weakness and headaches. Psychiatric/Behavioral: Negative for depression. Patient Active Problem List    Diagnosis Date Noted    Encounter for colonoscopy due to history of adenomatous colonic polyps 03/30/2016    Change in bowel habits 03/30/2016    Obesity 06/15/2015    Hypertension 12/02/2014    Multinodular non-toxic goiter 12/02/2014       Current Outpatient Prescriptions   Medication Sig Dispense Refill    triamterene-hydroCHLOROthiazide (MAXZIDE) 37.5-25 mg per tablet TAKE 1 TABLET BY MOUTH TWICE A  Tab 1    omega-3 fatty acids-vitamin e 1,000 mg cap Take 1 Cap by mouth.  OTHER \"tumeric\"      CALCIUM CARBONATE/VITAMIN D3 (CALCIUM + D PO) Take  by mouth daily.  multivitamin (ONE A DAY) tablet Take 1 Tab by mouth daily.          No Known Allergies   Visit Vitals    /74 (BP 1 Location: Right arm, BP Patient Position: Sitting)    Pulse 72    Temp 97.7 °F (36.5 °C) (Oral)    Resp 16    Ht 5' 4\" (1.626 m)    Wt 208 lb 3.2 oz (94.4 kg)    SpO2 98%    BMI 35.74 kg/m2       Physical Exam   Constitutional: She is oriented to person, place, and time. She appears well-developed. No distress. Eyes: Conjunctivae are normal.   Neck: Neck supple. No thyromegaly present. Cardiovascular: Normal rate, regular rhythm and normal heart sounds. Pulmonary/Chest: Effort normal and breath sounds normal. No respiratory distress. She has no wheezes. She has no rales. She exhibits no tenderness. Lymphadenopathy:     She has no cervical adenopathy. Neurological: She is alert and oriented to person, place, and time. Skin: Skin is warm. Psychiatric: She has a normal mood and affect. Lab Results  Component Value Date/Time   WBC 5.8 03/13/2017 09:19 AM   HGB 12.1 03/13/2017 09:19 AM   HCT 39.0 03/13/2017 09:19 AM   PLATELET 348 90/44/4582 09:19 AM   MCV 83 03/13/2017 09:19 AM     Lab Results  Component Value Date/Time   Hemoglobin A1c 5.8 09/14/2017 10:27 AM   Hemoglobin A1c 5.8 03/13/2017 09:19 AM   Hemoglobin A1c 6.0 04/06/2016 07:57 AM   Glucose 90 09/14/2017 10:27 AM   LDL, calculated 80 09/14/2017 10:27 AM   Creatinine 0.99 09/14/2017 10:27 AM      Lab Results  Component Value Date/Time   Cholesterol, total 168 09/14/2017 10:27 AM   HDL Cholesterol 74 09/14/2017 10:27 AM   LDL, calculated 80 09/14/2017 10:27 AM   Triglyceride 72 09/14/2017 10:27 AM   CHOL/HDL Ratio 2.6 01/11/2010 10:53 AM   Lab Results  Component Value Date/Time   ALT (SGPT) 11 09/14/2017 10:27 AM   AST (SGOT) 17 09/14/2017 10:27 AM   Alk.  phosphatase 94 09/14/2017 10:27 AM   Bilirubin, total 0.5 09/14/2017 10:27 AM   Albumin 4.1 09/14/2017 10:27 AM   Protein, total 7.2 09/14/2017 10:27 AM   PLATELET 276 72/17/5421 09:19 AM       Lab Results  Component Value Date/Time   GFR est non-AA 59 09/14/2017 10:27 AM   GFR est AA 68 09/14/2017 10:27 AM   Creatinine 0.99 09/14/2017 10:27 AM   BUN 16 09/14/2017 10:27 AM   Sodium 143 09/14/2017 10:27 AM   Potassium 4.0 09/14/2017 10:27 AM   Chloride 100 09/14/2017 10:27 AM   CO2 29 09/14/2017 10:27 AM   Lab Results  Component Value Date/Time   TSH 2.000 03/13/2017 09:19 AM   T3 Uptake 26 03/13/2017 09:19 AM   T4, Free 1.24 03/20/2015 10:38 AM   T4, Total 8.6 03/13/2017 09:19 AM      Lab Results   Component Value Date/Time    Glucose 90 09/14/2017 10:27 AM                         ASSESSMENT and PLAN  Diagnoses and all orders for this visit:    1. Essential hypertension- well controlled. Counseled on diet and exercise. Continue current regimen. 2. Multinodular non-toxic goiter- stable. 3. PND (post-nasal drip)-Post nasal drip  -Use nasal saline spray 3-4 times/day   -Start non sedating antihistamine such as Claritin, Allegra or Zyrtec (generic is fine) in the day; Follow-up Disposition:  Return in about 4 months (around 1/21/2018) for Medicare Wellness, Physical - 30 minute appointment. Medication risks/benefits/costs/interactions/alternatives discussed with patient. Sonia Karlos  was given an after visit summary which includes diagnoses, current medications, & vitals. she expressed understanding with the diagnosis and plan.

## 2017-09-21 NOTE — MR AVS SNAPSHOT
Visit Information Date & Time Provider Department Dept. Phone Encounter #  
 9/21/2017  8:30 AM Tami Mata MD [facility] Internal Medicine [phone] [id number] Follow-up Instructions Return in about 4 months (around 1/21/2018) for Medicare Wellness, Physical - 30 minute appointment. Upcoming Health Maintenance Date Due INFLUENZA AGE 9 TO ADULT 8/1/2017 GLAUCOMA SCREENING Q2Y 11/12/2017 MEDICARE YEARLY EXAM 1/19/2018 Pneumococcal 65+ Low/Medium Risk (2 of 2 - PPSV23) 5/17/2018 BREAST CANCER SCRN MAMMOGRAM 9/22/2018 COLONOSCOPY 3/30/2021 DTaP/Tdap/Td series (2 - Td) 4/5/2026 Allergies as of 9/21/2017  Review Complete On: 9/21/2017 By: Tami Mata MD  
 No Known Allergies Current Immunizations  Never Reviewed Name Date Influenza High Dose Vaccine PF 10/13/2015 Tdap 4/5/2016 10:30 AM  
  
 Not reviewed this visit You Were Diagnosed With   
  
 Codes Comments Essential hypertension    -  Primary ICD-10-CM: I10 
ICD-9-CM: 401.9 Multinodular non-toxic goiter     ICD-10-CM: E04.2 ICD-9-CM: 241.1 PND (post-nasal drip)     ICD-10-CM: R09.82 ICD-9-CM: 784.91 Vitals BP Pulse Temp Resp Height(growth percentile) Weight(growth percentile) 136/74 (BP 1 Location: Right arm, BP Patient Position: Sitting) 72 97.7 °F (36.5 °C) (Oral) 16 5' 4\" (1.626 m) 208 lb 3.2 oz (94.4 kg) SpO2 BMI OB Status Smoking Status 98% 35.74 kg/m2 Postmenopausal Never Smoker Vitals History BMI and BSA Data Body Mass Index Body Surface Area 35.74 kg/m 2 2.06 m 2 Preferred Pharmacy Pharmacy Name Phone [id number]- [place], [place] - [address] [phone] Your Updated Medication List  
  
   
This list is accurate as of: 9/21/17  9:32 AM.  Always use your most recent med list.  
  
  
  
  
 CALCIUM + D PO Take  by mouth daily. multivitamin tablet Commonly known as:  ONE A DAY Take 1 Tab by mouth daily. omega-3 fatty acids-vitamin e 1,000 mg Cap Take 1 Cap by mouth. OTHER \"tumeric\"  
  
 triamterene-hydroCHLOROthiazide 37.5-25 mg per tablet Commonly known as:  Maxi Alonso TAKE 1 TABLET BY MOUTH TWICE A DAY Follow-up Instructions Return in about 4 months (around 1/21/2018) for Medicare Wellness, Physical - 30 minute appointment. To-Do List   
 09/25/2017 10:15 AM  
  Appointment with [facility] [address] at [address] [place] [address] ([phone]) Shower or bathe using soap and water. Do not use deodorant, powder, perfumes, or lotion the day of your exam.  If your prior mammograms were not performed at [facility] please bring films with you or forward prior images 2 days before your procedure. Check in at registration 15min before your appointment time unless you were instructed to do otherwise. A script is not necessary, but if you have one, please bring it on the day of the mammogram or have it faxed to the department. [facility] [phone] [facility]  [phone] [facility]  [phone] [facility] [phone] [place] [id number] [facility] Charity Carlos [phone] Patient Instructions It was a pleasure to see you! As discussed: 
 
Lab review Your kidney and liver function are normal. No medication changes are needed Your cholesterol is well controlled. The remainder of your labs were normal. Some labs that may have been tested and their explanation are: 
Your electrolytes, kidney & liver function (Metabolic Panel) Anemia, blood cells (CBC) Thyroid (TSH + T4, T3) Hormones (prolactin, vitamin D ) Pregnancy (Beta HCG) Diabetes (Hemoglobin A1c) Post nasal drip 
-Use nasal saline spray 3-4 times/day  
-Start non sedating antihistamine such as Claritin, Allegra or Zyrtec (generic is fine) in the day;  
 
Nutrition Tips for Diabetes: After Your Visit Your Care Instructions A healthy diet is important to manage diabetes. It helps you lose weight (if you need to) and keep it off. It gives you the nutrition and energy your body needs and helps prevent heart disease. But a diet for diabetes does not mean that you have to eat special foods. You can eat what your family eats, including occasional sweets and other favorites. But you do have to pay attention to how often you eat and how much you eat of certain foods. The right plan for you will give you meals that help you keep your blood sugar at healthy levels. Try to eat a variety of foods and to spread carbohydrate throughout the day. Carbohydrate raises blood sugar higher and more quickly than any other nutrient does. Carbohydrate is found in sugar, breads and cereals, fruit, starchy vegetables such as potatoes and corn, and milk and yogurt. You may want to work with a dietitian or diabetes educator to help you plan meals and snacks. A dietitian or diabetes educator also can help you lose weight if that is one of your goals. The following tips can help you enjoy your meals and stay healthy. Follow-up care is a key part of your treatment and safety. Be sure to make and go to all appointments, and call your doctor if you are having problems. Its also a good idea to know your test results and keep a list of the medicines you take. How can you care for yourself at home? · Learn which foods have carbohydrate and how much carbohydrate to eat. A dietitian or diabetes educator can help you learn to keep track of how much carbohydrate you eat. · Spread carbohydrate throughout the day. Eat some carbohydrate at all meals, but do not eat too much at any one time. · Plan meals to include food from all the food groups. These are the food groups and some example portion sizes: ¨ Grains: 1 slice of bread (1 ounce), ½ cup of cooked cereal, and 1/3 cup of cooked pasta or rice.  These have about 15 grams of carbohydrate in a serving. Choose whole grains such as whole wheat bread or crackers, oatmeal, and brown rice more often than refined grains. ¨ Fruit: 1 small fresh fruit, such as an apple or orange; ½ of a banana; ½ cup of chopped, cooked, or canned fruit; ½ cup of fruit juice; 1 cup of melon or raspberries; and 2 tablespoons of dried fruit. These have about 15 grams of carbohydrate in a serving. ¨ Dairy: 1 cup of nonfat or low-fat milk and 2/3 cup of plain yogurt. These have about 15 grams of carbohydrate in a serving. ¨ Protein foods: Beef, chicken, turkey, fish, eggs, tofu, cheese, cottage cheese, and peanut butter. A serving size of meat is 3 ounces, which is about the size of a deck of cards. Examples of meat substitute serving sizes (equal to 1 ounce of meat) are 1/4 cup of cottage cheese, 1 egg, 1 tablespoon of peanut butter, and ½ cup of tofu. These have very little or no carbohydrate per serving. ¨ Vegetables: Starchy vegetables such as ½ cup of cooked dried beans, peas, potatoes, or corn have about 15 grams of carbohydrate. Nonstarchy vegetables have very little carbohydrate, such as 1 cup of raw leafy vegetables (such as spinach), ½ cup of other vegetables (cooked or chopped), and 3/4 cup of vegetable juice. · Use the plate format to plan meals. It is a good, quick way to make sure that you have a balanced meal. It also helps you spread carbohydrate throughout the day. You divide your plate by types of foods. Put vegetables on half the plate, meat or meat substitutes on one-quarter of the plate, and a grain or starchy vegetable (such as brown rice or a potato) in the final quarter of the plate. To this you can add a small piece of fruit and 1 cup of milk or yogurt, depending on how much carbohydrate you are supposed to eat at a meal. 
· Talk to your dietitian or diabetes educator about ways to add limited amounts of sweets into your meal plan.  You can eat these foods now and then, as long as you include the amount of carbohydrate they have in your daily carbohydrate allowance. · If you drink alcohol, limit it to no more than 1 drink a day for women and 2 drinks a day for men. If you are pregnant, no amount of alcohol is known to be safe. · Protein, fat, and fiber do not raise blood sugar as much as carbohydrate does. If you eat a lot of these nutrients in a meal, your blood sugar will rise more slowly than it would otherwise. · Limit saturated fats, such as those from meat and dairy products. Try to replace it with monounsaturated fat, such as olive oil. This is a healthier choice because people who have diabetes are at higher-than-average risk of heart disease. But use a modest amount of olive oil. A tablespoon of olive oil has 14 grams of fat and 120 calories. · Exercise lowers blood sugar. If you take insulin by shots or pump, you can use less than you would if you were not exercising. Keep in mind that timing matters. If you exercise within 1 hour after a meal, your body may need less insulin for that meal than it would if you exercised 3 hours after the meal. Test your blood sugar to find out how exercise affects your need for insulin. · Exercise on most days of the week. Aim for at least 30 minutes. Exercise helps you stay at a healthy weight and helps your body use insulin. Walking is an easy way to get exercise. Gradually increase the amount you walk every day. You also may want to swim, bike, or do other activities. When you eat out · Learn to estimate the serving sizes of foods that have carbohydrate. If you measure food at home, it will be easier to estimate the amount in a serving of restaurant food. · If the meal you order has too much carbohydrate (such as potatoes, corn, or baked beans), ask to have a low-carbohydrate food instead. Ask for a salad or green vegetables.  
· If you use insulin, check your blood sugar before and after eating out to help you plan how much to eat in the future. · If you eat more carbohydrate at a meal than you had planned, take a walk or do other exercise. This will help lower your blood sugar. Where can you learn more? Go to [employer].be Enter Y824 in the search box to learn more about \"Nutrition Tips for Diabetes: After Your Visit. \"  
© 6563-8789 Healthwise, Incorporated. Care instructions adapted under license by [employer] (which disclaims liability or warranty for this information). This care instruction is for use with your licensed healthcare professional. If you have questions about a medical condition or this instruction, always ask your healthcare professional. [address] any warranty or liability for your use of this information. Content Version: 56.8.427060; Current as of: June 4, 2014 Introducing [facility]! Dear Evans Priest: Thank you for requesting a [employer] account. Our records indicate that you already have an active [employer] account. You can access your account anytime at [email or web address] Did you know that you can access your hospital and ER discharge instructions at any time in [employer]? You can also review all of your test results from your hospital stay or ER visit. Additional Information If you have questions, please visit the Frequently Asked Questions section of the [employer] website at [email or web address] Remember, [employer] is NOT to be used for urgent needs. For medical emergencies, dial 911. Now available from your iPhone and Android! Please provide this summary of care documentation to your next provider. Your primary care clinician is listed as Alber Robles. If you have any questions after today's visit, please call [phone].

## 2017-09-25 ENCOUNTER — HOSPITAL ENCOUNTER (OUTPATIENT)
Dept: MAMMOGRAPHY | Age: 67
Discharge: HOME | End: 2017-09-25
Attending: INTERNAL MEDICINE
Payer: MEDICARE

## 2017-09-25 DIAGNOSIS — Z12.31 VISIT FOR SCREENING MAMMOGRAM: ICD-10-CM

## 2017-09-25 PROCEDURE — 77063 BREAST TOMOSYNTHESIS BI: CPT

## 2017-10-04 NOTE — PROGRESS NOTES
Dear Kerry Duarte   Thank you for completing your mammogram.  Please find your most recent results below. Your results show NO clinical evidence of breast cancer. We will repeat the screening in 1 year. In the interim, continue to perform monthly self breast exams and notify me if you have any suspicious findings. Do not hesitate to contact the office if you have any questions or concerns before your next appointment.      Kind regards,        ----  Lexy Barrera MD  Internal Medicine/ [facility] [address] [place] [address] Internal Medicine   [address], [id number] [address], [address]  Office: [phone]

## 2017-12-05 ENCOUNTER — OFFICE VISIT (OUTPATIENT)
Dept: INTERNAL MEDICINE CLINIC | Age: 67
End: 2017-12-05

## 2017-12-05 VITALS
HEIGHT: 64 IN | BODY MASS INDEX: 34.83 KG/M2 | DIASTOLIC BLOOD PRESSURE: 70 MMHG | SYSTOLIC BLOOD PRESSURE: 120 MMHG | OXYGEN SATURATION: 99 % | RESPIRATION RATE: 20 BRPM | WEIGHT: 204 LBS | TEMPERATURE: 98 F | HEART RATE: 83 BPM

## 2017-12-05 DIAGNOSIS — J40 BRONCHITIS: ICD-10-CM

## 2017-12-05 DIAGNOSIS — J18.9 PNEUMONIA DUE TO INFECTIOUS ORGANISM, UNSPECIFIED LATERALITY, UNSPECIFIED PART OF LUNG: ICD-10-CM

## 2017-12-05 DIAGNOSIS — R53.83 OTHER FATIGUE: Primary | ICD-10-CM

## 2017-12-05 RX ORDER — MELOXICAM 15 MG/1
TABLET ORAL
Refills: 2 | COMMUNITY
Start: 2017-10-18 | End: 2018-07-18

## 2017-12-05 NOTE — PROGRESS NOTES
1. Have you been to the ER, urgent care clinic since your last visit? Hospitalized since your last visit? Yes [facility] Reason for visit: Pneumonia    2. Have you seen or consulted any other health care providers outside of the [address] [place] [address] since your last visit? Include any pap smears or colon screening. No    [facility], [facility] done.  SOB, deep cough-mucus greenish, nasal congestion, fatigue

## 2017-12-05 NOTE — PATIENT INSTRUCTIONS
It was a pleasure to see you! As discussed: You need rest.   Schedule face to face for your . Stay well hydrated. Sleep at least 7-9hrs/ night    When should you call for help? Call 911 anytime you think you may need emergency care. For example, call if:  · You have severe trouble breathing. Call your doctor now or seek immediate medical care if:  · You have new or worse trouble breathing. · You cough up dark brown or bloody mucus (sputum). · You have a new or higher fever. · You have a new rash. Watch closely for changes in your health, and be sure to contact your doctor if:  · You cough more deeply or more often, especially if you notice more mucus or a change in the color of your mucus. · You are not getting better as expected. Fatigue: Care Instructions  Your Care Instructions    Fatigue is a feeling of tiredness, exhaustion, or lack of energy. You may feel fatigue because of too much or not enough activity. It can also come from stress, lack of sleep, boredom, and poor diet. Many medical problems, such as viral infections, can cause fatigue. Emotional problems, especially depression, are often the cause of fatigue. Fatigue is most often a symptom of another problem. Treatment for fatigue depends on the cause. For example, if you have fatigue because you have a certain health problem, treating this problem also treats your fatigue. If depression or anxiety is the cause, treatment may help. Follow-up care is a key part of your treatment and safety. Be sure to make and go to all appointments, and call your doctor if you are having problems. It's also a good idea to know your test results and keep a list of the medicines you take. How can you care for yourself at home? · Get regular exercise. But don't overdo it. Go back and forth between rest and exercise. · Get plenty of rest.  · Eat a healthy diet.  Do not skip meals, especially breakfast.  · Reduce your use of caffeine, tobacco, and alcohol. Caffeine is most often found in coffee, tea, cola drinks, and chocolate. · Limit medicines that can cause fatigue. This includes tranquilizers and cold and allergy medicines. When should you call for help? Watch closely for changes in your health, and be sure to contact your doctor if:  ? · You have new symptoms such as fever or a rash. ? · Your fatigue gets worse. ? · You have been feeling down, depressed, or hopeless. Or you may have lost interest in things that you usually enjoy. ? · You are not getting better as expected. Where can you learn more? Go to http://roman-kimberly.info/. Enter F566 in the search box to learn more about \"Fatigue: Care Instructions. \"  Current as of: March 20, 2017  Content Version: 11.4  © 1291-3520 Healthwise, Incorporated. Care instructions adapted under license by [employer] (which disclaims liability or warranty for this information). If you have questions about a medical condition or this instruction, always ask your healthcare professional. [place] [address] any warranty or liability for your use of this information.

## 2017-12-05 NOTE — PROGRESS NOTES
HISTORY OF PRESENT ILLNESS  Yary Paige is a 75 y.o. female. HPI  PNA Follow-up   Patient presents for follow-up. [facility] Records reviewed Given dose of Rocephin. Placed on-  [place] Horan. .  Reports  Improved:  cough described as non-productive, without wheezing, dyspnea or hemoptysis and headache described as vice     Still has: congestion, cough described as productive of clear sputum and fatigue    Denies:  fevers, chills. Went to [place], [place] for Thanksgiving. SHx: 's health is declining. She is the primary care giver. Her sister in laws are coming to help. Family helps with errands. Review of Systems   Constitutional: Negative for diaphoresis, fever and weight loss. Eyes: Negative for blurred vision and pain. Respiratory: Negative for shortness of breath. Cardiovascular: Negative for chest pain, orthopnea and leg swelling. Neurological: Negative for focal weakness and headaches. Psychiatric/Behavioral: Negative for depression. Patient Active Problem List    Diagnosis Date Noted    Encounter for colonoscopy due to history of adenomatous colonic polyps 03/30/2016    Change in bowel habits 03/30/2016    Obesity 06/15/2015    Hypertension 12/02/2014    Multinodular non-toxic goiter 12/02/2014       Current Outpatient Prescriptions   Medication Sig Dispense Refill    triamterene-hydroCHLOROthiazide (MAXZIDE) 37.5-25 mg per tablet TAKE 1 TABLET BY MOUTH TWICE A  Tab 1    omega-3 fatty acids-vitamin e 1,000 mg cap Take 1 Cap by mouth.  OTHER \"tumeric\"      CALCIUM CARBONATE/VITAMIN D3 (CALCIUM + D PO) Take  by mouth daily.  multivitamin (ONE A DAY) tablet Take 1 Tab by mouth daily.  meloxicam (MOBIC) 15 mg tablet TAKE 1 TABLET (15 MG TOTAL) BY MOUTH DAILY.   2       No Known Allergies   Visit Vitals    /70 (BP 1 Location: Right arm, BP Patient Position: Sitting)    Pulse 83    Temp 98 °F (36.7 °C) (Oral)    Resp 20    Ht 5' 4\" (1.626 m)    Wt 204 lb (92.5 kg)    SpO2 99%    BMI 35.02 kg/m2       Physical Exam   Constitutional: She appears well-developed. No distress. HENT:   Nose: Mucosal edema present. No rhinorrhea or septal deviation. Right sinus exhibits no maxillary sinus tenderness and no frontal sinus tenderness. Left sinus exhibits no maxillary sinus tenderness and no frontal sinus tenderness. Eyes: Conjunctivae are normal.   Neck: Neck supple. Cardiovascular: Normal rate, regular rhythm and normal heart sounds. Pulmonary/Chest: Effort normal and breath sounds normal. No respiratory distress. She has no wheezes. She has no rales. She exhibits no tenderness. Lymphadenopathy:     She has no cervical adenopathy. ASSESSMENT and PLAN  Diagnoses and all orders for this visit:    1. Other fatigue-likely due to sleep deprivation, chest leg and overworking. In addition to resolving acute respiratory infection. We discussed ways to optimize her symptoms. 2. Pneumonia due to infectious organism, unspecified laterality, unspecified part of lung oxygenation and clinical exam within normal limits. Her pneumonia appears to be resolving well on her current symptoms of fatigue are more related to lifestyle management. Red flags to warrant ER or earlier clinical evaluation reviewed. 3. Bronchitisit was unclear if she had pneumonia or bronchitis from the [facility] nonetheless her symptoms appear to have improved with the intervention of the [facility]  Follow-up Disposition:  Return in about 8 weeks (around 1/30/2018) for Follow-up, Hypertension and Respiratory Infection. Medication risks/benefits/costs/interactions/alternatives discussed with patient. Odessa Pham  was given an after visit summary which includes diagnoses, current medications, & vitals. she expressed understanding with the diagnosis and plan.

## 2017-12-22 ENCOUNTER — TELEPHONE (OUTPATIENT)
Dept: INTERNAL MEDICINE CLINIC | Age: 67
End: 2017-12-22

## 2018-02-05 ENCOUNTER — OFFICE VISIT (OUTPATIENT)
Dept: INTERNAL MEDICINE CLINIC | Age: 68
End: 2018-02-05

## 2018-02-05 VITALS
HEART RATE: 87 BPM | OXYGEN SATURATION: 98 % | DIASTOLIC BLOOD PRESSURE: 76 MMHG | WEIGHT: 199.2 LBS | SYSTOLIC BLOOD PRESSURE: 132 MMHG | TEMPERATURE: 97.8 F | BODY MASS INDEX: 34.01 KG/M2 | HEIGHT: 64 IN | RESPIRATION RATE: 16 BRPM

## 2018-02-05 DIAGNOSIS — F43.21 GRIEF: ICD-10-CM

## 2018-02-05 DIAGNOSIS — E04.2 MULTINODULAR NON-TOXIC GOITER: ICD-10-CM

## 2018-02-05 DIAGNOSIS — I10 ESSENTIAL HYPERTENSION: Primary | ICD-10-CM

## 2018-02-05 DIAGNOSIS — E66.9 CLASS 1 OBESITY WITHOUT SERIOUS COMORBIDITY WITH BODY MASS INDEX (BMI) OF 34.0 TO 34.9 IN ADULT, UNSPECIFIED OBESITY TYPE: ICD-10-CM

## 2018-02-05 DIAGNOSIS — R73.09 ELEVATED HEMOGLOBIN A1C: ICD-10-CM

## 2018-02-05 NOTE — PROGRESS NOTES
HISTORY OF PRESENT ILLNESS  Flavia Hernández is a 75 y.o. female. HPI  Pneumonia Follow-up   Patient presents for follow-up. Seen   2 months ago. Placed on-  antibiotics and inhaler. Reports  Improved:  cough described as non-productive, without wheezing, dyspnea or hemoptysis   Denies:  wheezing and fever, chills. Grief   Her  Lars Benitez  of respiratory failure in December. Daughter lives in [place]. She has strong support locally. Cardiovascular Review:  The patient has hypertension. Diet and Lifestyle: nonsmoker  Home BP Monitoring: is not measured at home. Pertinent ROS: taking medications as instructed, no medication side effects noted, no TIA's, no chest pain on exertion, no dyspnea on exertion, no swelling of ankles. Review of Systems   Constitutional: Negative for diaphoresis, fever and weight loss. Eyes: Negative for blurred vision and pain. Respiratory: Negative for shortness of breath. Cardiovascular: Negative for chest pain, orthopnea and leg swelling. Neurological: Negative for focal weakness and headaches. Psychiatric/Behavioral: Negative for depression. Patient Active Problem List    Diagnosis Date Noted    Encounter for colonoscopy due to history of adenomatous colonic polyps 2016    Change in bowel habits 2016    Obesity 06/15/2015    Hypertension 2014    Multinodular non-toxic goiter 2014       Current Outpatient Prescriptions   Medication Sig Dispense Refill    meloxicam (MOBIC) 15 mg tablet TAKE 1 TABLET (15 MG TOTAL) BY MOUTH DAILY. 2    triamterene-hydroCHLOROthiazide (MAXZIDE) 37.5-25 mg per tablet TAKE 1 TABLET BY MOUTH TWICE A  Tab 1    omega-3 fatty acids-vitamin e 1,000 mg cap Take 1 Cap by mouth.  OTHER \"tumeric\"      CALCIUM CARBONATE/VITAMIN D3 (CALCIUM + D PO) Take  by mouth daily.  multivitamin (ONE A DAY) tablet Take 1 Tab by mouth daily.          No Known Allergies   Visit Vitals    BP 132/76 (BP 1 Location: Right arm, BP Patient Position: Sitting)    Pulse 87    Temp 97.8 °F (36.6 °C) (Oral)    Resp 16    Ht 5' 4\" (1.626 m)    Wt 199 lb 3.2 oz (90.4 kg)    SpO2 96%    BMI 34.19 kg/m2       Physical Exam   Constitutional: She is oriented to person, place, and time. She appears well-developed. No distress. Eyes: Conjunctivae are normal.   Cardiovascular: Normal rate, regular rhythm and normal heart sounds. Pulmonary/Chest: Effort normal and breath sounds normal. No respiratory distress. She has no wheezes. She has no rales. She exhibits no tenderness. Musculoskeletal: She exhibits no edema (BLE: Wear compression hose ). Neurological: She is alert and oriented to person, place, and time. Skin: Skin is warm. Psychiatric: She has a normal mood and affect. ASSESSMENT and PLAN  Diagnoses and all orders for this visit:    1. Essential hypertension- well controlled. Counseled on diet and exercise. Continue current regimen.   -     LIPID PANEL  -     METABOLIC PANEL, COMPREHENSIVE  -     HEMOGLOBIN A1C WITH EAG    2. Multinodular non-toxic goiter- stable. Check TFTs   -     THYROID PANEL  -     TSH 3RD GENERATION    3. Grief- doing well. Has good support system. Self care emphasized. Depression red flags reviewed. Patient Education:  Reviewed concept of depression as biochemical imbalance of neurotransmitters and rationale for treatment. Instructed patient to contact office or 911 promptly should condition worsen or any new symptoms appear and provided on-call telephone numbers. IF THE PATIENT HAS ANY SUICIDAL OR HOMICIDAL IDEATION, CALL THE OFFICE, DISCUSS WITH A SUPPORT MEMBER OR GO TO THE ER IMMEDIATELY. Patient was agreeable with this      4. Class 1 obesity without serious comorbidity with body mass index (BMI) of 34.0 to 34.9 in adult, unspecified obesity type- I have reviewed/discussed the above normal BMI with the patient.   I have recommended the following interventions: dietary management education, guidance, and counseling . Rachel Jean -     LIPID PANEL  -     METABOLIC PANEL, COMPREHENSIVE  -     HEMOGLOBIN A1C WITH EAG    5. Elevated hemoglobin A1c  -     HEMOGLOBIN A1C WITH EAG    Follow-up Disposition:  Return in about 3 months (around 5/5/2018) for Hypertension, Follow-up. Medication risks/benefits/costs/interactions/alternatives discussed with patient. Kranthi Elkins  was given an after visit summary which includes diagnoses, current medications, & vitals. she expressed understanding with the diagnosis and plan.

## 2018-02-05 NOTE — PROGRESS NOTES
Chief Complaint   Patient presents with    URI     1. Have you been to the ER, urgent care clinic since your last visit? Hospitalized since your last visit? No    2. Have you seen or consulted any other health care providers outside of the [address] [place] [address] since your last visit? Include any pap smears or colon screening.  No

## 2018-02-05 NOTE — PATIENT INSTRUCTIONS
It was a pleasure to see you! As discussed:    Sorry for your loss. Mr. Carlos Garcia was truly a blessing to know. High Blood Pressure (BP)  Well controlled today  -Continue to check your BP at home   -Follow a low sodium diet (<1500mg/ day)   -Exercise regularly goal, 150 minutes of cardiovascular exercise/ week  -If your blood pressure is too low (<90/60) or too high (>180/100) or you have any symptoms such as chest pain, dizziness, shortness of breath- seek immediate medical attention. See  Www.health.gov for more information. Grief (Actual/Anticipated): Care Instructions  Your Care Instructions    Grief is your emotional reaction to a major loss. The words \"sorrow\" and \"heartache\" often are used to describe feelings of grief. You feel grief when you lose a beloved person, pet, place, or thing. It is also natural to feel grief when you lose a valued way of life, such as a job, marriage, or good health. You may begin to grieve before a loss occurs. You may grieve for a loved one who is sick and dying. Children and adults often feel the pain of loss before a big move or divorce. This type of grief helps you get ready for a loss. Grief is different for each person. There is no \"normal\" or \"expected\" period of time for grieving. Some people adjust to their loss within a couple of months. Others may take 2 years or longer, especially if their lives were changed a lot or if the loss was sudden and shocking. Grieving can cause problems such as headaches, loss of appetite, and trouble with thinking or sleeping. You may withdraw from friends and family and behave in ways that are unusual for you. Grief may cause you to question your beliefs and views about life. Grief is natural and does not require medical treatment. But if you have trouble sleeping, it may help to take sleeping pills for a short time. It may help to talk with people who have been through or are going through similar losses.  You may also want to talk to a counselor about your feelings. Talking about your loss, sharing your cares and concerns, and getting support from others are important parts of healthy grieving. Follow-up care is a key part of your treatment and safety. Be sure to make and go to all appointments, and call your doctor if you are having problems. It's also a good idea to know your test results and keep a list of the medicines you take. How can you care for yourself at home? · Get enough sleep. Your mind helps make sense of your life while you sleep. Missing sleep can lead to illness and make it harder for you to deal with your grief. · Eat healthy foods. Try to avoid eating only foods that give you comfort. Ask someone to join you for a meal if you do not like eating alone. Consider taking a multivitamin every day. · Get some exercise every day. Even a walk can help you deal with your grief. Other exercises, such as yoga, can also help you manage stress. · Comfort yourself. Take time to look at photos or use special items that make you feel better. · Stay involved in your life. Do not withdraw from the activities you enjoy. People you know at work, [employer], clubs, or other groups can help you get through your period of grief. · Think about joining a support group to help you deal with your grief. There are many support groups to help people recover from grief. When should you call for help? Call 911 anytime you think you may need emergency care. For example, call if:  ? · You feel you cannot stop from hurting yourself or someone else. ? Watch closely for changes in your health, and be sure to contact your doctor if:  ? · You think you may be depressed. ? · You do not get better as expected. Where can you learn more? Go to http://roman-kimberly.info/. Enter H249 in the search box to learn more about \"Grief (Actual/Anticipated): Care Instructions. \"  Current as of: September 24, 2016  Content Version: 11.4  © 8386-4607 Healthwise, Incorporated. Care instructions adapted under license by [employer] (which disclaims liability or warranty for this information). If you have questions about a medical condition or this instruction, always ask your healthcare professional. [address] any warranty or liability for your use of this information.

## 2018-02-05 NOTE — MR AVS SNAPSHOT
[address] [facility] [facility] [address] [facility] 
[phone] Patient: Paul Avalos MRN: [id number] SCD:[id number][birth year]2/[id number] Visit Information Date & Time Provider Department Dept. Phone Encounter #  
 2/5/2018  1:00 PM Lis Fuller MD [facility] [phone] Follow-up Instructions Return in about 3 months (around 5/5/2018) for Hypertension, Follow-up. Your Appointments 4/16/2018  9:00 AM  
Medicare Physical with Lis Fuller MD  
[facility] Internal Medicine Rina Rodriguez) Appt Note: medicare wellness [address] [facility] [address]  
[address] [id number] [address] Upcoming Health Maintenance Date Due  
 MEDICARE YEARLY EXAM 1/19/2018 Pneumococcal 65+ Low/Medium Risk (2 of 2 - PPSV23) 5/17/2018 GLAUCOMA SCREENING Q2Y 8/22/2019 BREAST CANCER SCRN MAMMOGRAM 9/25/2019 COLONOSCOPY 3/30/2021 DTaP/Tdap/Td series (2 - Td) 4/5/2026 Allergies as of 2/5/2018  Review Complete On: 2/5/2018 By: Lis Fuller MD  
 No Known Allergies Current Immunizations  Never Reviewed Name Date Influenza High Dose Vaccine PF 10/13/2015 Pneumococcal Conjugate (PCV-13) 5/17/2017 Tdap 4/5/2016 10:30 AM  
 Zoster Vaccine, Live 5/17/2017 Not reviewed this visit You Were Diagnosed With   
  
 Codes Comments Essential hypertension    -  Primary ICD-10-CM: I10 
ICD-9-CM: 401.9 Multinodular non-toxic goiter     ICD-10-CM: E04.2 ICD-9-CM: 241.1 Grief     ICD-10-CM: F64.50 ICD-9-CM: 309.0 Class 1 obesity without serious comorbidity with body mass index (BMI) of 34.0 to 34.9 in adult, unspecified obesity type     ICD-10-CM: E66.9, Z68.34 
ICD-9-CM: 278.00, V85.34 Vitals BP Pulse Temp Resp Height(growth percentile) Weight(growth percentile) 132/76 (BP 1 Location: Right arm, BP Patient Position: Sitting) 87 97.8 °F (36.6 °C) (Oral) 16 5' 4\" (1.626 m) 199 lb 3.2 oz (90.4 kg) SpO2 BMI OB Status Smoking Status 98% 34.19 kg/m2 Postmenopausal Never Smoker Vitals History BMI and BSA Data Body Mass Index Body Surface Area  
 34.19 kg/m 2 2.02 m 2 Preferred Pharmacy Pharmacy Name Phone [id number]- [place], [place] - [address] [phone] Your Updated Medication List  
  
   
This list is accurate as of: 2/5/18  1:49 PM.  Always use your most recent med list.  
  
  
  
  
 CALCIUM + D PO Take  by mouth daily. meloxicam 15 mg tablet Commonly known as:  MOBIC  
TAKE 1 TABLET (15 MG TOTAL) BY MOUTH DAILY. multivitamin tablet Commonly known as:  ONE A DAY Take 1 Tab by mouth daily. omega-3 fatty acids-vitamin e 1,000 mg Cap Take 1 Cap by mouth. OTHER \"tumeric\"  
  
 triamterene-hydroCHLOROthiazide 37.5-25 mg per tablet Commonly known as:  Aung Boas TAKE 1 TABLET BY MOUTH TWICE A DAY Follow-up Instructions Return in about 3 months (around 5/5/2018) for Hypertension, Follow-up. Patient Instructions It was a pleasure to see you! As discussed: 
 
Sorry for your loss. Mr. Ese Palacios was truly a blessing to know. High Blood Pressure (BP) Well controlled today 
-Continue to check your BP at home  
-Follow a low sodium diet (<1500mg/ day) -Exercise regularly goal, 150 minutes of cardiovascular exercise/ week 
-If your blood pressure is too low (<90/60) or too high (>180/100) or you have any symptoms such as chest pain, dizziness, shortness of breath- seek immediate medical attention. See  Www.health.gov for more information. Grief (Actual/Anticipated): Care Instructions Your Care Instructions Grief is your emotional reaction to a major loss.  The words \"sorrow\" and \"heartache\" often are used to describe feelings of grief. You feel grief when you lose a beloved person, pet, place, or thing. It is also natural to feel grief when you lose a valued way of life, such as a job, marriage, or good health. You may begin to grieve before a loss occurs. You may grieve for a loved one who is sick and dying. Children and adults often feel the pain of loss before a big move or divorce. This type of grief helps you get ready for a loss. Grief is different for each person. There is no \"normal\" or \"expected\" period of time for grieving. Some people adjust to their loss within a couple of months. Others may take 2 years or longer, especially if their lives were changed a lot or if the loss was sudden and shocking. Grieving can cause problems such as headaches, loss of appetite, and trouble with thinking or sleeping. You may withdraw from friends and family and behave in ways that are unusual for you. Grief may cause you to question your beliefs and views about life. Grief is natural and does not require medical treatment. But if you have trouble sleeping, it may help to take sleeping pills for a short time. It may help to talk with people who have been through or are going through similar losses. You may also want to talk to a counselor about your feelings. Talking about your loss, sharing your cares and concerns, and getting support from others are important parts of healthy grieving. Follow-up care is a key part of your treatment and safety. Be sure to make and go to all appointments, and call your doctor if you are having problems. It's also a good idea to know your test results and keep a list of the medicines you take. How can you care for yourself at home? · Get enough sleep. Your mind helps make sense of your life while you sleep. Missing sleep can lead to illness and make it harder for you to deal with your grief. · Eat healthy foods. Try to avoid eating only foods that give you comfort. Ask someone to join you for a meal if you do not like eating alone. Consider taking a multivitamin every day. · Get some exercise every day. Even a walk can help you deal with your grief. Other exercises, such as yoga, can also help you manage stress. · Comfort yourself. Take time to look at photos or use special items that make you feel better. · Stay involved in your life. Do not withdraw from the activities you enjoy. People you know at work, [employer], clubs, or other groups can help you get through your period of grief. · Think about joining a support group to help you deal with your grief. There are many support groups to help people recover from grief. When should you call for help? Call 911 anytime you think you may need emergency care. For example, call if: 
? · You feel you cannot stop from hurting yourself or someone else. ? Watch closely for changes in your health, and be sure to contact your doctor if: 
? · You think you may be depressed. ? · You do not get better as expected. Where can you learn more? Go to http://roman-kimberly.info/. Enter H249 in the search box to learn more about \"Grief (Actual/Anticipated): Care Instructions. \" Current as of: September 24, 2016 Content Version: 11.4 © 6006-3389 Healthwise, Incorporated. Care instructions adapted under license by [employer] (which disclaims liability or warranty for this information). If you have questions about a medical condition or this instruction, always ask your healthcare professional. [place] [address] any warranty or liability for your use of this information. Introducing [facility]! Dear Yasmany Porter: Thank you for requesting a [employer] account. Our records indicate that you already have an active [employer] account. You can access your account anytime at [email or web address] Did you know that you can access your hospital and ER discharge instructions at any time in Optifreeze? You can also review all of your test results from your hospital stay or ER visit. Additional Information If you have questions, please visit the Frequently Asked Questions section of the Optifreeze website at [email or web address] Remember, Optifreeze is NOT to be used for urgent needs. For medical emergencies, dial 911. Now available from your iPhone and Android! Please provide this summary of care documentation to your next provider. Your primary care clinician is listed as Tanesha Benjamin. If you have any questions after today's visit, please call [phone].

## 2018-02-26 ENCOUNTER — TELEPHONE (OUTPATIENT)
Dept: INTERNAL MEDICINE CLINIC | Age: 68
End: 2018-02-26

## 2018-02-26 NOTE — TELEPHONE ENCOUNTER
Left detailed message as follows per drs notes-We will see her tomorrow. Violetta Michele her symptoms worsen overnight she should go to the emergency room.  If she has any shortness of breath or

## 2018-02-26 NOTE — TELEPHONE ENCOUNTER
Patient called to schedule an appt for the tickle in her throat and coughs to try and relieve it, she also states feels a flutter in her chest at times when lying down.

## 2018-02-27 ENCOUNTER — HOSPITAL ENCOUNTER (OUTPATIENT)
Dept: GENERAL RADIOLOGY | Age: 68
Discharge: HOME | End: 2018-02-27
Payer: MEDICARE

## 2018-02-27 ENCOUNTER — HOSPITAL ENCOUNTER (OUTPATIENT)
Dept: LAB | Age: 68
Discharge: HOME | End: 2018-02-27
Payer: MEDICARE

## 2018-02-27 ENCOUNTER — OFFICE VISIT (OUTPATIENT)
Dept: INTERNAL MEDICINE CLINIC | Age: 68
End: 2018-02-27

## 2018-02-27 VITALS
OXYGEN SATURATION: 95 % | SYSTOLIC BLOOD PRESSURE: 130 MMHG | RESPIRATION RATE: 16 BRPM | DIASTOLIC BLOOD PRESSURE: 90 MMHG | HEART RATE: 72 BPM | TEMPERATURE: 97.8 F | WEIGHT: 201 LBS | HEIGHT: 64 IN | BODY MASS INDEX: 34.31 KG/M2

## 2018-02-27 DIAGNOSIS — I51.7 LEFT ATRIAL ENLARGEMENT: ICD-10-CM

## 2018-02-27 DIAGNOSIS — R07.0 THROAT DISCOMFORT: ICD-10-CM

## 2018-02-27 DIAGNOSIS — R00.2 PALPITATIONS: Primary | ICD-10-CM

## 2018-02-27 DIAGNOSIS — I10 ESSENTIAL HYPERTENSION: ICD-10-CM

## 2018-02-27 PROCEDURE — 71046 X-RAY EXAM CHEST 2 VIEWS: CPT

## 2018-02-27 PROCEDURE — 84443 ASSAY THYROID STIM HORMONE: CPT

## 2018-02-27 PROCEDURE — 36415 COLL VENOUS BLD VENIPUNCTURE: CPT

## 2018-02-27 PROCEDURE — 84436 ASSAY OF TOTAL THYROXINE: CPT

## 2018-02-27 PROCEDURE — 80053 COMPREHEN METABOLIC PANEL: CPT

## 2018-02-27 PROCEDURE — 80061 LIPID PANEL: CPT

## 2018-02-27 PROCEDURE — 83036 HEMOGLOBIN GLYCOSYLATED A1C: CPT

## 2018-02-27 NOTE — PROGRESS NOTES
Hi Mrs. Ana Lilia Jones,   It was a pleasure to see you today. Your xray is normal.  Continue the treatment for discussed in clinic today including seeing the cardiologist as scheduled. Do not hesitate to contact the office if you have any questions or concerns before your next appointment. When should you call for help? Call 911 anytime you think you may need emergency care. For example, call if:  You have severe trouble breathing. Call your doctor now or seek immediate medical care if:  You have new or worse trouble breathing. You cough up dark brown or bloody mucus (sputum). You have a new or higher fever. You have a new rash. Watch closely for changes in your health, and be sure to contact your doctor if:   You cough more deeply or more often, especially if you notice more mucus or a change in the color of your mucus. You are not getting better as expected.     Kind regards,   Dr. Jeanie Tyler

## 2018-02-27 NOTE — PROGRESS NOTES
HISTORY OF PRESENT ILLNESS  Sienna Nunes is a 75 y.o. female. HPI  Palpitations  Patient complains of palpitations. The symptoms are of mild severity, occuring bedtime and lasting 1 minute per episode. Cardiac risk factors include: family history, obesity, hypertension, stress, post-menopausal, HTN . Aggravating factors: recumbancy. Relieving factors: spontaneous, cough. Associated signs and symptoms: has complaint(s) of palpitations. Brother is my patient per pt, and has severe heart disease. Right Facial Complaints  Reports several months of right sided throat tickles, right eye richardson, and ear feels moist.   Has not tried antihistamine. Grief  Reports she is recovering well. She is connected with family. She has remodeled her bedroom. Sits in his chair which makes her feel close to him. Review of Systems   Constitutional: Negative for diaphoresis, fever and weight loss. Eyes: Negative for blurred vision and pain. Respiratory: Negative for shortness of breath. Cardiovascular: Positive for palpitations. Negative for chest pain, orthopnea and leg swelling. Neurological: Negative for focal weakness and headaches. Psychiatric/Behavioral: Negative for depression. Patient Active Problem List    Diagnosis Date Noted    Encounter for colonoscopy due to history of adenomatous colonic polyps 03/30/2016    Change in bowel habits 03/30/2016    Obesity 06/15/2015    Hypertension 12/02/2014    Multinodular non-toxic goiter 12/02/2014       Current Outpatient Prescriptions   Medication Sig Dispense Refill    triamterene-hydroCHLOROthiazide (MAXZIDE) 37.5-25 mg per tablet TAKE 1 TABLET BY MOUTH TWICE A  Tab 1    omega-3 fatty acids-vitamin e 1,000 mg cap Take 1 Cap by mouth.  CALCIUM CARBONATE/VITAMIN D3 (CALCIUM + D PO) Take  by mouth daily.  multivitamin (ONE A DAY) tablet Take 1 Tab by mouth daily.       meloxicam (MOBIC) 15 mg tablet TAKE 1 TABLET (15 MG TOTAL) BY MOUTH DAILY. 2    OTHER \"tumeric\"         No Known Allergies   Visit Vitals    /90 (BP 1 Location: Right arm, BP Patient Position: Sitting)    Pulse 72    Temp 97.8 °F (36.6 °C) (Oral)    Resp 16    Ht 5' 4\" (1.626 m)    Wt 201 lb (91.2 kg)    SpO2 95%    BMI 34.5 kg/m2       Physical Exam  Lab Results  Component Value Date/Time   WBC 5.8 03/13/2017 09:19 AM   HGB 12.1 03/13/2017 09:19 AM   HCT 39.0 03/13/2017 09:19 AM   PLATELET 270 28/38/2798 09:19 AM   MCV 83 03/13/2017 09:19 AM     Lab Results  Component Value Date/Time   Hemoglobin A1c 5.8 (H) 09/14/2017 10:27 AM   Hemoglobin A1c 5.8 (H) 03/13/2017 09:19 AM   Hemoglobin A1c 6.0 (H) 04/06/2016 07:57 AM   Glucose 90 09/14/2017 10:27 AM   LDL, calculated 80 09/14/2017 10:27 AM   Creatinine 0.99 09/14/2017 10:27 AM      Lab Results  Component Value Date/Time   Cholesterol, total 168 09/14/2017 10:27 AM   HDL Cholesterol 74 09/14/2017 10:27 AM   LDL, calculated 80 09/14/2017 10:27 AM   Triglyceride 72 09/14/2017 10:27 AM   CHOL/HDL Ratio 2.6 01/11/2010 10:53 AM     Lab Results  Component Value Date/Time   ALT (SGPT) 11 09/14/2017 10:27 AM   AST (SGOT) 17 09/14/2017 10:27 AM   Alk.  phosphatase 94 09/14/2017 10:27 AM   Bilirubin, total 0.5 09/14/2017 10:27 AM   Albumin 4.1 09/14/2017 10:27 AM   Protein, total 7.2 09/14/2017 10:27 AM   PLATELET 363 12/70/3235 09:19 AM       Lab Results  Component Value Date/Time   GFR est non-AA 59 (L) 09/14/2017 10:27 AM   GFR est AA 68 09/14/2017 10:27 AM   Creatinine 0.99 09/14/2017 10:27 AM   BUN 16 09/14/2017 10:27 AM   Sodium 143 09/14/2017 10:27 AM   Potassium 4.0 09/14/2017 10:27 AM   Chloride 100 09/14/2017 10:27 AM   CO2 29 09/14/2017 10:27 AM     Lab Results  Component Value Date/Time   TSH 2.000 03/13/2017 09:19 AM   T3 Uptake 26 03/13/2017 09:19 AM   T4, Free 1.24 03/20/2015 10:38 AM   T4, Total 8.6 03/13/2017 09:19 AM      Lab Results   Component Value Date/Time    Glucose 90 09/14/2017 10:27 AM       [facility] [address], [address] [place] [address]  [phone]    Transthoracic Echocardiogram    Patient: Prosper Macedo  MRN: [id number]  ACCT #: [de-identified]  : 1950  Age: 60 years  Gender: Female  Height:  Weight:  BSA:  Study date: 14-Aug-2012  BP: 121 / 69 mmHg  Status: O  Location: Out-patient area  [facility] ACC #: [id number]    Allergies: NO ALLERGY INFORMATION AVAILABLE    Reading Group:  [facility]  Technologist: Saul Young  Reading Physician: Calvin Gupta. Rebecca Santiago MD    SUMMARY:  Procedure information: Systolic blood pressure was 121 mmHg, at the start  of the study. Diastolic blood pressure was 69 mmHg, at the start of the  study. Left ventricle: Systolic function was normal. Ejection fraction was  estimated in the range of 60 % to 65 %. There were no regional wall motion  abnormalities. Left atrium: The atrium was mildly dilated. Tricuspid valve: There was no regurgitation. There was no pulmonary  hypertension. INDICATIONS: Follow up for MR and PHTN    PROCEDURE: This was a routine study. The study included complete 2D  imaging, complete spectral Doppler, and color Doppler. Systolic blood  pressure was 121 mmHg, at the start of the study. Diastolic blood pressure  was 69 mmHg, at the start of the study. LEFT VENTRICLE: Size was normal. Systolic function was normal. Ejection  fraction was estimated in the range of 60 % to 65 %. There were no  regional wall motion abnormalities. Wall thickness was normal. DOPPLER:  The transmitral flow pattern was normal. The deceleration time of the  early transmitral flow velocity was normal. The pulmonary vein flow  pattern was normal. Left ventricular diastolic function parameters were  normal.    RIGHT VENTRICLE: The size was normal. Systolic function was normal. Wall  thickness was normal.    LEFT ATRIUM: The atrium was mildly dilated. RIGHT ATRIUM: Size was normal.    MITRAL VALVE: Normal valve structure.  There was normal leaflet separation. DOPPLER: The transmitral velocity was within the normal range. There was  no evidence for stenosis. There was no regurgitation. AORTIC VALVE: The valve was trileaflet. Leaflets exhibited normal  thickness and normal cuspal separation. DOPPLER: Transaortic velocity was  within the normal range. There was no stenosis. There was no regurgitation. TRICUSPID VALVE: Normal valve structure. There was normal leaflet  separation. DOPPLER: The transtricuspid velocity was within the normal  range. There was no evidence for tricuspid stenosis. There was no  regurgitation. There was no pulmonary hypertension. PULMONIC VALVE: Leaflets exhibited normal thickness, no calcification, and  normal cuspal separation. DOPPLER: The transpulmonic velocity was within  the normal range. There was no regurgitation. AORTA: The root exhibited normal size. PULMONARY ARTERY: The size was normal. DOPPLER: Systolic pressure was  within the normal range. SYSTEMIC VEINS: IVC: The inferior vena cava was normal in size. PERICARDIUM: There was no pericardial effusion. SYSTEM MEASUREMENT TABLES    2D  Ao Diam: 2.3 cm  LA Diam: 4.2 cm  %FS: 41.5 %  EDV(Teich): 79.7 ml  EF(Teich): 72.8 %  IVSd: 0.8 cm  LVIDd: 4.2 cm  LVIDs: 2.5 cm  LVPWd: 0.9 cm  LVPWs: 1.6 cm  LVd Mass (ASE): 110.4 g    CW  TR Vmax: 2.6 m/s  TR maxP.3 mmHg    Prepared and E-signed by    Gera Arenas. Galilea Rod MD  Signed 14-Aug-2012 13:57:31  ASSESSMENT and PLAN  Diagnoses and all orders for this visit:    1. Palpitations- NSR on exam today. No red flag but given risk factors for afib and other arrhythmia further wkup needed. Complete labs-(per previous ordered and add Mg). Cardiology evaluation. Red flags to warrant ER or earlier clinical evaluation reviewed.    Scheduled with Cardiology 3/5/18.   -     AMB POC EKG ROUTINE W/ 12 LEADS, INTER & REP  -     REFERRAL TO CARDIOLOGY  -     MAGNESIUM    2. Essential hypertension- slightly elevated per new guidelines. Counseled on diet and exercise. Continue current regimen. 3. Throat discomfort- has ENT appt scheduled for 3/12/18     4. Left atrial enlargement- previously on seen on 2012 echo. Could place her at risk for arrhythmia would benefit from seeing cardiology for repeat evaluation including echo. CXR ordered to look for lung disease lore given intermittently decreased 02 sat. -     REFERRAL TO CARDIOLOGY  -     XR CHEST PA LAT; Future      Follow-up Disposition:  Return if symptoms worsen or fail to improve before scheduled appt. Medication risks/benefits/costs/interactions/alternatives discussed with patient. Krupa Lauren  was given an after visit summary which includes diagnoses, current medications, & vitals. she expressed understanding with the diagnosis and plan.

## 2018-02-27 NOTE — MR AVS SNAPSHOT
[address] [facility] [address] 
[phone] Patient: Krupa Lauren MRN: [id number] NVD:[birth year] Visit Information Date & Time Provider Department Dept. Phone Encounter #  
 2/27/2018  9:30 AM Carine Guzman MD [address] Internal Medicine [phone] [id number] Your Appointments 3/5/2018  9:00 AM  
New Patient with Sudhakar Harris MD  
[address] ([address]) Appt Note: Np ref by Dr Lara Luis DX: heart palpitations;essential hypertension;left atrial enlargement [address] [facility] [address]  
[facility] [address] [address]  
  
    
 5/7/2018 11:30 AM  
ROUTINE CARE with Carine Guzman MD  
[address] [employer] Internal Medicine [address]) Appt Note: 3m fuv  
 [address] [facility] [address] [facility] [address]  
[address] [address]  
  
    
 6/20/2018 11:00 AM  
PHYSICAL with Carine Guzman MD  
[address] [employer] Internal Medicine [address] [place] [address] Road) Appt Note: [address] [facility] [address] [place] [address]  
[address] Upcoming Health Maintenance Date Due  
 MEDICARE YEARLY EXAM 1/19/2018 Pneumococcal 65+ Low/Medium Risk (2 of 2 - PPSV23) 5/17/2018 GLAUCOMA SCREENING Q2Y 8/22/2019 BREAST CANCER SCRN MAMMOGRAM 9/25/2019 COLONOSCOPY 3/30/2021 DTaP/Tdap/Td series (2 - Td) 4/5/2026 Allergies as of 2/27/2018  Review Complete On: 2/27/2018 By: Carine Guzman MD  
 No Known Allergies Current Immunizations  Never Reviewed Name Date Influenza High Dose Vaccine PF 10/13/2015 Pneumococcal Conjugate (PCV-13) 5/17/2017 Tdap 4/5/2016 10:30 AM  
 Zoster Vaccine, Live 5/17/2017 Not reviewed this visit You Were Diagnosed With   
 Codes Comments Palpitations    -  Primary ICD-10-CM: R00.2 ICD-9-CM: 785.1 Essential hypertension     ICD-10-CM: I10 
ICD-9-CM: 401.9 Throat discomfort     ICD-10-CM: R07.0 ICD-9-CM: 784.1 Left atrial enlargement     ICD-10-CM: I51.7 ICD-9-CM: 429.3 Vitals BP Pulse Temp Resp Height(growth percentile) Weight(growth percentile) 130/90 (BP 1 Location: Right arm, BP Patient Position: Sitting) 72 97.8 °F (36.6 °C) (Oral) 16 5' 4\" (1.626 m) 201 lb (91.2 kg) SpO2 BMI OB Status Smoking Status 95% 34.5 kg/m2 Postmenopausal Never Smoker BMI and BSA Data Body Mass Index Body Surface Area 34.5 kg/m 2 2.03 m 2 Preferred Pharmacy Pharmacy Name Phone [id number]- [place], [place] - [address] [phone] Your Updated Medication List  
  
   
This list is accurate as of 2/27/18 10:08 AM.  Always use your most recent med list.  
  
  
  
  
 CALCIUM + D PO Take  by mouth daily. meloxicam 15 mg tablet Commonly known as:  MOBIC  
TAKE 1 TABLET (15 MG TOTAL) BY MOUTH DAILY. multivitamin tablet Commonly known as:  ONE A DAY Take 1 Tab by mouth daily. omega-3 fatty acids-vitamin e 1,000 mg Cap Take 1 Cap by mouth. OTHER \"tumeric\"  
  
 triamterene-hydroCHLOROthiazide 37.5-25 mg per tablet Commonly known as:  Kriss Jerica TAKE 1 TABLET BY MOUTH TWICE A DAY We Performed the Following AMB POC EKG ROUTINE W/ 12 LEADS, INTER & REP [88452 CPT(R)] MAGNESIUM [id number] CPT(R)] REFERRAL TO CARDIOLOGY [YJL66 Custom] To-Do List   
 02/27/2018 Imaging:  XR CHEST PA LAT Referral Information Referral ID Referred By Referred To  
  
 [id number] [address], [address] [place], [place]   
   [address] [place], [address] Phone: [phone] Fax: [phone] Visits Status Start Date End Date 1 New Request 2/27/18 2/27/19 If your referral has a status of pending review or denied, additional information will be sent to support the outcome of this decision. Patient Instructions It was a pleasure to see you! As discussed: 
 
See cardiology as scheduled Complete your labs and xray. See below for more information Palpitations: Care Instructions Your Care Instructions Heart palpitations are the uncomfortable sensation that your heart is beating fast or irregularly. You might feel pounding or fluttering in your chest. It might feel like your heart is skipping a beat. Although palpitations may be caused by a heart problem, they also occur because of stress, fatigue, or use of alcohol, caffeine, or nicotine. Many medicines, including diet pills, antihistamines, decongestants, and some herbal products, can cause heart palpitations. Nearly everyone has palpitations from time to time. Depending on your symptoms, your doctor may need to do more tests to try to find the cause of your palpitations. Follow-up care is a key part of your treatment and safety. Be sure to make and go to all appointments, and call your doctor if you are having problems. It's also a good idea to know your test results and keep a list of the medicines you take. How can you care for yourself at home? · Avoid caffeine, nicotine, and excess alcohol. · Do not take illegal drugs, such as methamphetamines and cocaine. · Do not take weight loss or diet medicines unless you talk with your doctor first. 
· Get plenty of sleep. · Do not overeat. · If you have palpitations again, take deep breaths and try to relax. This may slow a racing heart. · If you start to feel lightheaded, lie down to avoid injuries that might result if you pass out and fall down. · Keep a record of your palpitations and bring it to your next doctor's appointment. Write down: ¨ The date and time. ¨ Your pulse. (If your heart is beating fast, it may be hard to count your pulse.) ¨ What you were doing when the palpitations started. ¨ How long the palpitations lasted. ¨ Any other symptoms. · If an activity causes palpitations, slow down or stop. Talk to your doctor before you do that activity again. · Take your medicines exactly as prescribed. Call your doctor if you think you are having a problem with your medicine. When should you call for help? Call 911 anytime you think you may need emergency care. For example, call if: 
? · You passed out (lost consciousness). ? · You have symptoms of a heart attack. These may include: ¨ Chest pain or pressure, or a strange feeling in the chest. 
¨ Sweating. ¨ Shortness of breath. ¨ Pain, pressure, or a strange feeling in the back, neck, jaw, or upper belly or in one or both shoulders or arms. ¨ Lightheadedness or sudden weakness. ¨ A fast or irregular heartbeat. After you call 911, the  may tell you to chew 1 adult-strength or 2 to 4 low-dose aspirin. Wait for an ambulance. Do not try to drive yourself. ? · You have symptoms of a stroke. These may include: 
¨ Sudden numbness, tingling, weakness, or loss of movement in your face, arm, or leg, especially on only one side of your body. ¨ Sudden vision changes. ¨ Sudden trouble speaking. ¨ Sudden confusion or trouble understanding simple statements. ¨ Sudden problems with walking or balance. ¨ A sudden, severe headache that is different from past headaches. ?Call your doctor now or seek immediate medical care if: 
? · You have heart palpitations and: ¨ Are dizzy or lightheaded, or you feel like you may faint. ¨ Have new or increased shortness of breath. ? Watch closely for changes in your health, and be sure to contact your doctor if: 
? · You continue to have heart palpitations. Where can you learn more? Go to http://roman-kimberly.info/. Enter R508 in the search box to learn more about \"Palpitations: Care Instructions. \" Current as of: September 21, 2016 Content Version: 11.4 © 2408-5357 Progression. Care instructions adapted under license by [employer] (which disclaims liability or warranty for this information). If you have questions about a medical condition or this instruction, always ask your healthcare professional. [address] any warranty or liability for your use of this information. Introducing [facility]! Dear Anthony Lira: Thank you for requesting a Innovolt account. Our records indicate that you already have an active Innovolt account. You can access your account anytime at [email or web address] Did you know that you can access your hospital and ER discharge instructions at any time in Innovolt? You can also review all of your test results from your hospital stay or ER visit. Additional Information If you have questions, please visit the Frequently Asked Questions section of the Innovolt website at [email or web address] Remember, Innovolt is NOT to be used for urgent needs. For medical emergencies, dial 911. Now available from your iPhone and Android! Please provide this summary of care documentation to your next provider. Your primary care clinician is listed as Elsa Tucker. If you have any questions after today's visit, please call [phone].

## 2018-02-27 NOTE — PROGRESS NOTES
Pt. Is here for right ear fullness,  Sore throat, intermittent palpitations. Pt. Forgets 2nd maxzide often.

## 2018-02-27 NOTE — PATIENT INSTRUCTIONS
It was a pleasure to see you! As discussed:    See cardiology as scheduled  Complete your labs and xray. See below for more information          Palpitations: Care Instructions  Your Care Instructions    Heart palpitations are the uncomfortable sensation that your heart is beating fast or irregularly. You might feel pounding or fluttering in your chest. It might feel like your heart is skipping a beat. Although palpitations may be caused by a heart problem, they also occur because of stress, fatigue, or use of alcohol, caffeine, or nicotine. Many medicines, including diet pills, antihistamines, decongestants, and some herbal products, can cause heart palpitations. Nearly everyone has palpitations from time to time. Depending on your symptoms, your doctor may need to do more tests to try to find the cause of your palpitations. Follow-up care is a key part of your treatment and safety. Be sure to make and go to all appointments, and call your doctor if you are having problems. It's also a good idea to know your test results and keep a list of the medicines you take. How can you care for yourself at home? · Avoid caffeine, nicotine, and excess alcohol. · Do not take illegal drugs, such as methamphetamines and cocaine. · Do not take weight loss or diet medicines unless you talk with your doctor first.  · Get plenty of sleep. · Do not overeat. · If you have palpitations again, take deep breaths and try to relax. This may slow a racing heart. · If you start to feel lightheaded, lie down to avoid injuries that might result if you pass out and fall down. · Keep a record of your palpitations and bring it to your next doctor's appointment. Write down:  ¨ The date and time. ¨ Your pulse. (If your heart is beating fast, it may be hard to count your pulse.)  ¨ What you were doing when the palpitations started. ¨ How long the palpitations lasted. ¨ Any other symptoms.   · If an activity causes palpitations, slow down or stop. Talk to your doctor before you do that activity again. · Take your medicines exactly as prescribed. Call your doctor if you think you are having a problem with your medicine. When should you call for help? Call 911 anytime you think you may need emergency care. For example, call if:  ? · You passed out (lost consciousness). ? · You have symptoms of a heart attack. These may include:  ¨ Chest pain or pressure, or a strange feeling in the chest.  ¨ Sweating. ¨ Shortness of breath. ¨ Pain, pressure, or a strange feeling in the back, neck, jaw, or upper belly or in one or both shoulders or arms. ¨ Lightheadedness or sudden weakness. ¨ A fast or irregular heartbeat. After you call 911, the  may tell you to chew 1 adult-strength or 2 to 4 low-dose aspirin. Wait for an ambulance. Do not try to drive yourself. ? · You have symptoms of a stroke. These may include:  ¨ Sudden numbness, tingling, weakness, or loss of movement in your face, arm, or leg, especially on only one side of your body. ¨ Sudden vision changes. ¨ Sudden trouble speaking. ¨ Sudden confusion or trouble understanding simple statements. ¨ Sudden problems with walking or balance. ¨ A sudden, severe headache that is different from past headaches. ?Call your doctor now or seek immediate medical care if:  ? · You have heart palpitations and:  ¨ Are dizzy or lightheaded, or you feel like you may faint. ¨ Have new or increased shortness of breath. ? Watch closely for changes in your health, and be sure to contact your doctor if:  ? · You continue to have heart palpitations. Where can you learn more? Go to http://roman-kimberly.info/. Enter R508 in the search box to learn more about \"Palpitations: Care Instructions. \"  Current as of: September 21, 2016  Content Version: 11.4  © 4282-7260 Linkfluence.  Care instructions adapted under license by [employer] (which disclaims liability or warranty for this information). If you have questions about a medical condition or this instruction, always ask your healthcare professional. [place] [address] any warranty or liability for your use of this information.

## 2018-02-28 LAB
ALBUMIN SERPL-MCNC: 4.1 G/DL (ref 3.6–4.8)
ALBUMIN/GLOB SERPL: 1.4 {RATIO} (ref 1.2–2.2)
ALP SERPL-CCNC: 84 IU/L (ref 39–117)
ALT SERPL-CCNC: 13 IU/L (ref 0–32)
AST SERPL-CCNC: 19 IU/L (ref 0–40)
BILIRUB SERPL-MCNC: 0.4 MG/DL (ref 0–1.2)
BUN SERPL-MCNC: 12 MG/DL (ref 8–27)
BUN/CREAT SERPL: 12 (ref 12–28)
CALCIUM SERPL-MCNC: 9.5 MG/DL (ref 8.7–10.3)
CHLORIDE SERPL-SCNC: 99 MMOL/L (ref 96–106)
CHOLEST SERPL-MCNC: 155 MG/DL (ref 100–199)
CO2 SERPL-SCNC: 29 MMOL/L (ref 18–29)
CREAT SERPL-MCNC: 1 MG/DL (ref 0.57–1)
EST. AVERAGE GLUCOSE BLD GHB EST-MCNC: 114 MG/DL
FT4I SERPL CALC-MCNC: 1.9 (ref 1.2–4.9)
GFR SERPLBLD CREATININE-BSD FMLA CKD-EPI: 58 ML/MIN/1.73
GFR SERPLBLD CREATININE-BSD FMLA CKD-EPI: 67 ML/MIN/1.73
GLOBULIN SER CALC-MCNC: 2.9 G/DL (ref 1.5–4.5)
GLUCOSE SERPL-MCNC: 89 MG/DL (ref 65–99)
HBA1C MFR BLD: 5.6 % (ref 4.8–5.6)
HDLC SERPL-MCNC: 78 MG/DL
LDLC SERPL CALC-MCNC: 62 MG/DL (ref 0–99)
MAGNESIUM SERPL-MCNC: 1.9 MG/DL (ref 1.6–2.3)
POTASSIUM SERPL-SCNC: 4.1 MMOL/L (ref 3.5–5.2)
PROT SERPL-MCNC: 7 G/DL (ref 6–8.5)
SODIUM SERPL-SCNC: 139 MMOL/L (ref 134–144)
T3RU NFR SERPL: 27 % (ref 24–39)
T4 SERPL-MCNC: 7.2 UG/DL (ref 4.5–12)
TRIGL SERPL-MCNC: 75 MG/DL (ref 0–149)
TSH SERPL DL<=0.005 MIU/L-ACNC: 1.31 UIU/ML (ref 0.45–4.5)
VLDLC SERPL CALC-MCNC: 15 MG/DL (ref 5–40)

## 2018-03-08 ENCOUNTER — CLINICAL SUPPORT (OUTPATIENT)
Dept: CARDIOLOGY CLINIC | Age: 68
End: 2018-03-08

## 2018-03-08 ENCOUNTER — TELEPHONE (OUTPATIENT)
Dept: CARDIOLOGY CLINIC | Age: 68
End: 2018-03-08

## 2018-03-08 ENCOUNTER — OFFICE VISIT (OUTPATIENT)
Dept: CARDIOLOGY CLINIC | Age: 68
End: 2018-03-08

## 2018-03-08 VITALS
BODY MASS INDEX: 34.66 KG/M2 | HEART RATE: 78 BPM | HEIGHT: 64 IN | SYSTOLIC BLOOD PRESSURE: 140 MMHG | OXYGEN SATURATION: 94 % | WEIGHT: 203 LBS | DIASTOLIC BLOOD PRESSURE: 80 MMHG

## 2018-03-08 DIAGNOSIS — R00.2 PALPITATIONS: Primary | ICD-10-CM

## 2018-03-08 DIAGNOSIS — Z82.49 FAMILY HISTORY OF PREMATURE CAD: ICD-10-CM

## 2018-03-08 DIAGNOSIS — I10 ESSENTIAL HYPERTENSION: ICD-10-CM

## 2018-03-08 RX ORDER — GUAIFENESIN 100 MG/5ML
81 LIQUID (ML) ORAL DAILY
COMMUNITY

## 2018-03-08 NOTE — MR AVS SNAPSHOT
[address] [facility] [address] 
[phone] Patient: Anamika Shoemaker MRN: [id number] Visit Information Date & Time Provider Department Dept. Phone Encounter #  
 3/8/2018  3:00 PM Willis Meza MD [facility] Manohar Ocampo [phone] [id number] Follow-up Instructions Return in about 4 weeks (around 2018). Your Appointments 3/8/2018  3:00 PM  
New Patient with Willis Meza MD  
[facility] Appt Note: Np ref by Dr Ck Zambrano DX: heart palpitations;essential hypertension;left atrial enlargement Higinio Pankaj; Np ref by Dr Ck Zambrano DX: heart palpitations;essential hypertension;left atrial enlargement Higinio Pankaj; Np ref by Dr Ck Zambrano DX: heart palpitations;essential hypertension;[facility] [address] [facility] [address]  
[address] [facility] [address]  
  
    
 2018 11:30 AM  
ROUTINE CARE with Patti Lucas MD  
[facility] Internal Medicine [facility]) Appt Note: 3m fuv  
 [address] [facility] [address] [facility] [address]  
[address] [address] [facility] [address]  
  
    
 2018 11:00 AM  
PHYSICAL with Patti Lucas MD  
[facility]) Appt Note: [address] [facility] [address] [facility] [address]  
[address] [address] [place] [address] [address] Upcoming Health Maintenance Date Due Pneumococcal 65+ Low/Medium Risk (2 of 2 - PPSV23) 2018 GLAUCOMA SCREENING Q2Y 2019 BREAST CANCER SCRN MAMMOGRAM 2019 COLONOSCOPY 3/30/2021 DTaP/Tdap/Td series (2 - Td) 2026 Allergies as of 3/8/2018  Review Complete On: 3/8/2018 By: Willis Meza MD  
 No Known Allergies Current Immunizations  Never Reviewed Name Date Influenza High Dose Vaccine PF 10/13/2015 Pneumococcal Conjugate (PCV-13) 5/17/2017 Tdap 4/5/2016 10:30 AM  
 Zoster Vaccine, Live 5/17/2017 Not reviewed this visit You Were Diagnosed With   
  
 Codes Comments Essential hypertension    -  Primary ICD-10-CM: I10 
ICD-9-CM: 401.9 Palpitations     ICD-10-CM: R00.2 ICD-9-CM: 785.1 Vitals BP Pulse Height(growth percentile) Weight(growth percentile) SpO2 BMI  
 140/80 78 5' 4\" (1.626 m) 203 lb (92.1 kg) 94% 34.84 kg/m2 OB Status Smoking Status Postmenopausal Never Smoker Vitals History BMI and BSA Data Body Mass Index Body Surface Area 34.84 kg/m 2 2.04 m 2 Preferred Pharmacy Pharmacy Name Phone [id number] [address] [phone] Your Updated Medication List  
  
   
This list is accurate as of 3/8/18  2:55 PM.  Always use your most recent med list.  
  
  
  
  
 aspirin 81 mg chewable tablet Take 81 mg by mouth daily. Patient states she \"usually takes\" a baby aspirin daily CALCIUM + D PO Take  by mouth daily. meloxicam 15 mg tablet Commonly known as:  MOBIC  
TAKE 1 TABLET (15 MG TOTAL) BY MOUTH DAILY. multivitamin tablet Commonly known as:  ONE A DAY Take 1 Tab by mouth daily. omega-3 fatty acids-vitamin e 1,000 mg Cap Take 1 Cap by mouth. OTHER \"tumeric\"  
  
 triamterene-hydroCHLOROthiazide 37.5-25 mg per tablet Commonly known as:  Pennie Crooked TAKE 1 TABLET BY MOUTH TWICE A DAY Follow-up Instructions Return in about 4 weeks (around 4/5/2018). Introducing \[facility]\"" & HEALTH SERVICES! Dear Theone Tiffanie: Thank you for requesting a Weblance account. Our records indicate that you already have an active Weblance account. You can access your account anytime at [email or web address]. [email or web address] Did you know that you can access your hospital and ER discharge instructions at any time in Stukent? You can also review all of your test results from your hospital stay or ER visit. Additional Information If you have questions, please visit the Frequently Asked Questions section of the Stukent website at [email or web address] Remember, Stukent is NOT to be used for urgent needs. For medical emergencies, dial 911. Now available from your iPhone and Android! Please provide this summary of care documentation to your next provider. Your primary care clinician is listed as Rhys Sierra. If you have any questions after today's visit, please call [phone].

## 2018-03-08 NOTE — PROGRESS NOTES
HISTORY OF PRESENT ILLNESS  Candido Rogers is a 75 y.o. 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  OTHER \"tumeric\"    CALCIUM CARBONATE/VITAMIN D3 (CALCIUM + D PO) Take  by mouth daily.  multivitamin (ONE A DAY) tablet Take 1 Tab by mouth daily.  meloxicam (MOBIC) 15 mg tablet TAKE 1 TABLET (15 MG TOTAL) BY MOUTH DAILY. No current facility-administered medications on file prior to visit. CARDIOLOGY STUDIES TO DATE:   lae otherwise normal echo      Chief Complaint   Patient presents with    Irregular Heart Beat     HPI :  Ms. Kemp Peabody is a 75year-old referred by Dr. Bronson Shaw for cardiac evaluation. She has longstanding hypertension, prediabetes. She has never smoked. Cholesterol has been good. Family history is positive for premature coronary disease and her father had an MI at age 52. Her   in December and for the last couple of months she has been noticing palpitations, mostly at night when she lies down to go to sleep. These are not associated with any other symptoms. She is active, but gets no regular exercise.   She saw Dr. Bronson Shaw recently and an EKG was obtained and I have reviewed it. It showed normal sinus rhythm, normal intervals and axis and no ST-T wave changes. Her blood work including a thyroid profile were unremarkable. CARDIAC ROS:   negative for chest pain, dyspnea, syncope, orthopnea, paroxysmal nocturnal dyspnea, exertional chest pressure/discomfort, claudication, lower extremity edema    Family History   Problem Relation Age of Onset    Cancer Mother      Colon CA    Hypertension Mother     Heart Disease Father     Hypertension Father     Cancer Sister      Colon CA    Hypertension Sister     Cancer Brother      Esophageal    Hypertension Brother     Cancer Brother      Multiple myeloma    Hypertension Brother     Heart Disease Brother     Hypertension Brother     Hypertension Brother        Past Medical History:   Diagnosis Date    Hypertension     Thyroid nodule     annual eval with US       GENERAL ROS:  A comprehensive review of systems was negative except for: Musculoskeletal: positive for arthralgias  Behvioral/Psych: positive for sleep disturbance    Visit Vitals    /80    Pulse 78    Ht 5' 4\" (1.626 m)    Wt 203 lb (92.1 kg)    SpO2 94%    BMI 34.84 kg/m2       Wt Readings from Last 3 Encounters:   03/08/18 203 lb (92.1 kg)   02/27/18 201 lb (91.2 kg)   02/05/18 199 lb 3.2 oz (90.4 kg)            BP Readings from Last 3 Encounters:   03/08/18 140/80   02/27/18 130/90   02/05/18 132/76       PHYSICAL EXAM  General appearance: alert, cooperative, no distress, appears stated age  Neurologic: Alert and oriented X 3  Neck: supple, symmetrical, trachea midline, no adenopathy, no carotid bruit and no JVD  Lungs: clear to auscultation bilaterally  Heart: regular rate and rhythm, S1, S2 normal, no S3 or S4, systolic murmur: early systolic 1/6, crescendo at 2nd left intercostal space  Abdomen: soft, non-tender.  Bowel sounds normal. No masses,  no organomegaly  Extremities: extremities normal, atraumatic, no cyanosis or edema  Pulses: 2+ and symmetric    Lab Results   Component Value Date/Time    Cholesterol, total 155 02/27/2018 10:30 AM    Cholesterol, total 168 09/14/2017 10:27 AM    Cholesterol, total 181 03/13/2017 09:19 AM    Cholesterol, total 182 04/06/2016 07:57 AM    Cholesterol, total 171 11/16/2015 08:05 AM    HDL Cholesterol 78 02/27/2018 10:30 AM    HDL Cholesterol 74 09/14/2017 10:27 AM    HDL Cholesterol 76 03/13/2017 09:19 AM    HDL Cholesterol 69 04/06/2016 07:57 AM    HDL Cholesterol 69 11/16/2015 08:05 AM    LDL, calculated 62 02/27/2018 10:30 AM    LDL, calculated 80 09/14/2017 10:27 AM    LDL, calculated 87 03/13/2017 09:19 AM    LDL, calculated 96 04/06/2016 07:57 AM    LDL, calculated 86 11/16/2015 08:05 AM    Triglyceride 75 02/27/2018 10:30 AM    Triglyceride 72 09/14/2017 10:27 AM    Triglyceride 92 03/13/2017 09:19 AM    Triglyceride 86 04/06/2016 07:57 AM    Triglyceride 78 11/16/2015 08:05 AM    CHOL/HDL Ratio 2.6 01/11/2010 10:53 AM    CHOL/HDL Ratio 2.3 02/02/2009 09:50 AM     ASSESSMENT  Ms. Lesvia Roberts is probably just having premature atrial or ventricular contractions, but they are new and she is worried about this. I suspect it is related to stress and her sleep disturbance around the death of her , but that being said we are going to go ahead and provide her with an event monitor and get an echocardiogram to evaluate this murmur. I tried to reassure her that in the absence of other cardiac type symptoms, it was very likely that this would turn out to be nothing major. I do not think she needs any stress testing at this point. current treatment plan is effective, no change in therapy  lab results and schedule of future lab studies reviewed with patient  reviewed diet, exercise and weight control    Encounter Diagnoses   Name Primary?     Essential hypertension Yes    Palpitations      Orders Placed This Encounter    aspirin 81 mg chewable tablet       Follow-up Disposition:  Return in about 4 weeks (around 4/5/2018).     Henrietta Roberts MD  3/8/2018

## 2018-03-08 NOTE — TELEPHONE ENCOUNTER
Dr. Eric López would like patient to have an event monitor mailed to her dx palpitations, HTN, murmur. Thanks.

## 2018-03-14 ENCOUNTER — CLINICAL SUPPORT (OUTPATIENT)
Dept: CARDIOLOGY CLINIC | Age: 68
End: 2018-03-14

## 2018-03-14 DIAGNOSIS — I10 ESSENTIAL HYPERTENSION: ICD-10-CM

## 2018-03-14 DIAGNOSIS — R00.2 PALPITATIONS: ICD-10-CM

## 2018-03-15 ENCOUNTER — TELEPHONE (OUTPATIENT)
Dept: CARDIOLOGY CLINIC | Age: 68
End: 2018-03-15

## 2018-03-15 NOTE — PROGRESS NOTES
Heart muscle is strong. Couple of very mildly leaky valves, not a problem or cause for symptoms. Looks great.

## 2018-03-15 NOTE — TELEPHONE ENCOUNTER
----- Message from Kelle Calderon MD sent at 3/15/2018  9:15 AM EDT -----  Heart muscle is strong. Couple of very mildly leaky valves, not a problem or cause for symptoms. Looks great.

## 2018-04-04 ENCOUNTER — TELEPHONE (OUTPATIENT)
Dept: CARDIOLOGY CLINIC | Age: 68
End: 2018-04-04

## 2018-04-04 NOTE — TELEPHONE ENCOUNTER
Verified patient with two types of identifiers. Patient states that she will be going on vacation and wanted to know if she could remove monitor a few days early. Notified patient okay to remove early. Patient verbalized understanding and will call with any other questions.

## 2018-04-04 NOTE — TELEPHONE ENCOUNTER
Patient would like to know if she can take off monitor on 4/13 instead of 4/16. Please call her at [phone].  Thank you

## 2018-04-18 ENCOUNTER — TELEPHONE (OUTPATIENT)
Dept: CARDIOLOGY CLINIC | Age: 68
End: 2018-04-18

## 2018-04-18 NOTE — TELEPHONE ENCOUNTER
Patient's end of service event monitor report is on your desk for review.     Future Appointments  Date Time Provider Yomi Hidalgo   5/30/2018 2:20 PM Kanchan So  [address] 6/20/2018 11:00 AM [place] Krishnamurthy MD [address] [facility]

## 2018-04-18 NOTE — TELEPHONE ENCOUNTER
Jon Barron MD   You 9 minutes ago (12:57 PM)                 No abnormalities. Left message requesting call back. Will let patient know there were no abnormalities seen on monitor. Patient called. Call returned and patient was given results (note in CC).

## 2018-04-18 NOTE — TELEPHONE ENCOUNTER
Called patient. Verified patient's identity with two identifiers. notified her of results. Patient verbalizes understanding and denies further questions or concerns.

## 2018-06-04 ENCOUNTER — OFFICE VISIT (OUTPATIENT)
Dept: CARDIOLOGY CLINIC | Age: 68
End: 2018-06-04

## 2018-06-04 VITALS
HEART RATE: 82 BPM | RESPIRATION RATE: 20 BRPM | HEIGHT: 64 IN | WEIGHT: 196.6 LBS | BODY MASS INDEX: 33.57 KG/M2 | OXYGEN SATURATION: 98 % | SYSTOLIC BLOOD PRESSURE: 130 MMHG | DIASTOLIC BLOOD PRESSURE: 78 MMHG

## 2018-06-04 DIAGNOSIS — I10 ESSENTIAL HYPERTENSION: Primary | ICD-10-CM

## 2018-06-04 DIAGNOSIS — R00.2 PALPITATIONS: ICD-10-CM

## 2018-06-04 DIAGNOSIS — Z82.49 FAMILY HISTORY OF PREMATURE CAD: ICD-10-CM

## 2018-06-04 NOTE — PROGRESS NOTES
HISTORY OF PRESENT ILLNESS  Parmjit Steel is a 75 y.o. female     SUMMARY:   Problem List  Date Reviewed: 6/4/2018          Codes Class Noted    Palpitations ICD-10-CM: R00.2  ICD-9-CM: 785.1  6/4/2018    Overview Signed 6/4/2018  9:02 AM by Kanchan So MD     3/18 echo normal lvef, mild mr and tr without pul htn  3/18 event monitor, nsr             Left atrial enlargement ICD-10-CM: I51.7  ICD-9-CM: 429.3  2/27/2018        Encounter for colonoscopy due to history of adenomatous colonic polyps ICD-10-CM: Z12.11, Z86.010  ICD-9-CM: V76.51, V12.72  3/30/2016        Change in bowel habits ICD-10-CM: R19.4  ICD-9-CM: 787.99  3/30/2016        Obesity ICD-10-CM: E66.9  ICD-9-CM: 278.00  6/15/2015        Hypertension ICD-10-CM: I10  ICD-9-CM: 401.9  12/2/2014        Multinodular non-toxic goiter ICD-10-CM: E04.2  ICD-9-CM: 241.1  12/2/2014              Current Outpatient Prescriptions on File Prior to Visit   Medication Sig    aspirin 81 mg chewable tablet Take 81 mg by mouth daily. Patient states she \"usually takes\" a baby aspirin daily    triamterene-hydroCHLOROthiazide (MAXZIDE) 37.5-25 mg per tablet TAKE 1 TABLET BY MOUTH TWICE A DAY    omega-3 fatty acids-vitamin e 1,000 mg cap Take 1 Cap by mouth.  OTHER \"tumeric\"    CALCIUM CARBONATE/VITAMIN D3 (CALCIUM + D PO) Take  by mouth daily.  multivitamin (ONE A DAY) tablet Take 1 Tab by mouth daily.  meloxicam (MOBIC) 15 mg tablet TAKE 1 TABLET (15 MG TOTAL) BY MOUTH DAILY. No current facility-administered medications on file prior to visit. CARDIOLOGY STUDIES TO DATE:  3/18 echo normal lvef, mild mr and tr without pul htn  3/18 event monitor, nsr        Chief Complaint   Patient presents with    Irregular Heart Beat     HPI :  Ms. Humberto Alvarez is doing fine. Things have settled down, though she still feels a little something palpitating occasionally at night when she is trying to fall asleep.   Interestingly, we saw absolutely nothing on her event monitor and her echocardiogram looked good and we went over all of that again. She is very active, but not exercising and plans to start the [address] [place] [address] at the [facility] when her insurance changes in July. CARDIAC ROS:   negative for chest pain, dyspnea, syncope, orthopnea, paroxysmal nocturnal dyspnea, exertional chest pressure/discomfort, claudication, lower extremity edema    Family History   Problem Relation Age of Onset    Cancer Mother      Colon CA    Hypertension Mother     Heart Disease Father      heart attack age 52, smoker    Hypertension Father     Cancer Sister      Colon CA    Hypertension Sister     Cancer Brother      Esophageal    Hypertension Brother     Cancer Brother      Multiple myeloma    Hypertension Brother     Heart Disease Brother     Hypertension Brother     Hypertension Brother        Past Medical History:   Diagnosis Date    Hypertension     Thyroid nodule     annual eval with US       GENERAL ROS:  A comprehensive review of systems was negative except for that written in the HPI.     Visit Vitals    /78 (BP 1 Location: Left arm, BP Patient Position: Sitting)    Pulse 82    Resp 20    Ht 5' 4\" (1.626 m)    Wt 196 lb 9.6 oz (89.2 kg)    SpO2 98%    BMI 33.75 kg/m2       Wt Readings from Last 3 Encounters:   06/04/18 196 lb 9.6 oz (89.2 kg)   03/08/18 203 lb (92.1 kg)   02/27/18 201 lb (91.2 kg)            BP Readings from Last 3 Encounters:   06/04/18 130/78   03/08/18 140/80   02/27/18 130/90       PHYSICAL EXAM  General appearance: alert, cooperative, no distress, appears stated age  Neck: supple, symmetrical, trachea midline, no adenopathy, no carotid bruit and no JVD  Lungs: clear to auscultation bilaterally  Heart: regular rate and rhythm, S1, S2 normal, no murmur, click, rub or gallop  Extremities: extremities normal, atraumatic, no cyanosis or edema    Lab Results   Component Value Date/Time    Cholesterol, total 155 02/27/2018 10:30 AM    Cholesterol, total 168 09/14/2017 10:27 AM    Cholesterol, total 181 03/13/2017 09:19 AM    Cholesterol, total 182 04/06/2016 07:57 AM    Cholesterol, total 171 11/16/2015 08:05 AM    HDL Cholesterol 78 02/27/2018 10:30 AM    HDL Cholesterol 74 09/14/2017 10:27 AM    HDL Cholesterol 76 03/13/2017 09:19 AM    HDL Cholesterol 69 04/06/2016 07:57 AM    HDL Cholesterol 69 11/16/2015 08:05 AM    LDL, calculated 62 02/27/2018 10:30 AM    LDL, calculated 80 09/14/2017 10:27 AM    LDL, calculated 87 03/13/2017 09:19 AM    LDL, calculated 96 04/06/2016 07:57 AM    LDL, calculated 86 11/16/2015 08:05 AM    Triglyceride 75 02/27/2018 10:30 AM    Triglyceride 72 09/14/2017 10:27 AM    Triglyceride 92 03/13/2017 09:19 AM    Triglyceride 86 04/06/2016 07:57 AM    Triglyceride 78 11/16/2015 08:05 AM    CHOL/HDL Ratio 2.6 01/11/2010 10:53 AM    CHOL/HDL Ratio 2.3 02/02/2009 09:50 AM     ASSESSMENT  Ms. Karlee Hutton is stable, asymptomatic and well compensated on a good medical regimen. She had reassuringly normal testing and needs no further testing at this time. current treatment plan is effective, no change in therapy  lab results and schedule of future lab studies reviewed with patient  reviewed diet, exercise and weight control    Encounter Diagnoses   Name Primary?  Essential hypertension Yes    Palpitations     Family history of premature CAD      No orders of the defined types were placed in this encounter. Follow-up Disposition:  Return if symptoms worsen or fail to improve.     Carine Stokes MD  6/4/2018

## 2018-06-04 NOTE — MR AVS SNAPSHOT
[address] [facility] [address] [place] 
[phone] Patient: Vivien Nicole MRN: [id number] NXS:[id number] Visit Information Date & Time Provider Department Dept. Phone Encounter #  
 6/4/2018  2:00 PM Ida Wong MD [facility] Joel Haq [phone] [id number] Follow-up Instructions Return if symptoms worsen or fail to improve. Your Appointments 6/20/2018 11:00 AM  
PHYSICAL with Suzette Nicole MD  
[facility] [address] [facility]) Appt Note: [address] [facility] [address] [facility] [address] [address] [address] [facility] [address] [place] Upcoming Health Maintenance Date Due  
 MEDICARE YEARLY EXAM 3/14/2018 Pneumococcal 65+ Low/Medium Risk (2 of 2 - PPSV23) 5/17/2018 Influenza Age 5 to Adult 8/1/2018 GLAUCOMA SCREENING Q2Y 8/22/2019 BREAST CANCER SCRN MAMMOGRAM 9/25/2019 COLONOSCOPY 3/30/2021 DTaP/Tdap/Td series (2 - Td) 4/5/2026 Allergies as of 6/4/2018  Review Complete On: 6/4/2018 By: Ida Wong MD  
 No Known Allergies Current Immunizations  Never Reviewed Name Date Influenza High Dose Vaccine PF 10/13/2015 Pneumococcal Conjugate (PCV-13) 5/17/2017 Tdap 4/5/2016 10:30 AM  
 Zoster Vaccine, Live 5/17/2017 Not reviewed this visit You Were Diagnosed With   
  
 Codes Comments Essential hypertension    -  Primary ICD-10-CM: I10 
ICD-9-CM: 401.9 Palpitations     ICD-10-CM: R00.2 ICD-9-CM: 785.1 Family history of premature CAD     ICD-10-CM: Z82.49 
ICD-9-CM: V17.3 Vitals BP Pulse Resp Height(growth percentile) Weight(growth percentile) SpO2  
 130/78 (BP 1 Location: Left arm, BP Patient Position: Sitting) 82 20 5' 4\" (1.626 m) 196 lb 9.6 oz (89.2 kg) 98% BMI OB Status Smoking Status 33.75 kg/m2 Postmenopausal Never Smoker Vitals History BMI and BSA Data Body Mass Index Body Surface Area 33.75 kg/m 2 2.01 m 2 Preferred Pharmacy Pharmacy Name Phone [id number] [place] - [address] [phone] Your Updated Medication List  
  
   
This list is accurate as of 6/4/18  2:14 PM.  Always use your most recent med list.  
  
  
  
  
 aspirin 81 mg chewable tablet Take 81 mg by mouth daily. Patient states she \"usually takes\" a baby aspirin daily CALCIUM + D PO Take  by mouth daily. meloxicam 15 mg tablet Commonly known as:  MOBIC  
TAKE 1 TABLET (15 MG TOTAL) BY MOUTH DAILY. multivitamin tablet Commonly known as:  ONE A DAY Take 1 Tab by mouth daily. omega-3 fatty acids-vitamin e 1,000 mg Cap Take 1 Cap by mouth. OTHER \"tumeric\"  
  
 triamterene-hydroCHLOROthiazide 37.5-25 mg per tablet Commonly known as:  Ruthanna Bis TAKE 1 TABLET BY MOUTH TWICE A DAY Follow-up Instructions Return if symptoms worsen or fail to improve. Introducing [facility]! Dear Niko Doll: Thank you for requesting a Ipsum account. Our records indicate that you already have an active Ipsum account. You can access your account anytime at [email or web address]. [email or web address] Did you know that you can access your hospital and ER discharge instructions at any time in Ipsum? You can also review all of your test results from your hospital stay or ER visit. Additional Information If you have questions, please visit the Frequently Asked Questions section of the Ipsum website at [email or web address] Remember, Ipsum is NOT to be used for urgent needs. For medical emergencies, dial 911. Now available from your iPhone and Android! Please provide this summary of care documentation to your next provider. Your primary care clinician is listed as Dian Randhawa.  If you have any questions after today's visit, please call [phone].

## 2018-07-18 ENCOUNTER — OFFICE VISIT (OUTPATIENT)
Dept: INTERNAL MEDICINE CLINIC | Age: 68
End: 2018-07-18

## 2018-07-18 VITALS
SYSTOLIC BLOOD PRESSURE: 130 MMHG | WEIGHT: 197 LBS | HEIGHT: 64 IN | OXYGEN SATURATION: 97 % | RESPIRATION RATE: 16 BRPM | BODY MASS INDEX: 33.63 KG/M2 | DIASTOLIC BLOOD PRESSURE: 80 MMHG | HEART RATE: 76 BPM | TEMPERATURE: 98.5 F

## 2018-07-18 DIAGNOSIS — M17.0 PRIMARY OSTEOARTHRITIS OF BOTH KNEES: ICD-10-CM

## 2018-07-18 DIAGNOSIS — I10 ESSENTIAL HYPERTENSION: ICD-10-CM

## 2018-07-18 DIAGNOSIS — Z23 ENCOUNTER FOR IMMUNIZATION: ICD-10-CM

## 2018-07-18 DIAGNOSIS — E66.9 CLASS 1 OBESITY WITHOUT SERIOUS COMORBIDITY WITH BODY MASS INDEX (BMI) OF 34.0 TO 34.9 IN ADULT, UNSPECIFIED OBESITY TYPE: ICD-10-CM

## 2018-07-18 DIAGNOSIS — Z00.00 MEDICARE ANNUAL WELLNESS VISIT, SUBSEQUENT: Primary | ICD-10-CM

## 2018-07-18 DIAGNOSIS — R79.9 ABNORMAL FINDING OF BLOOD CHEMISTRY: ICD-10-CM

## 2018-07-18 RX ORDER — DICLOFENAC SODIUM 10 MG/G
GEL TOPICAL
Qty: 100 G | Refills: 0 | Status: SHIPPED | OUTPATIENT
Start: 2018-07-18 | End: 2019-04-03

## 2018-07-18 NOTE — PROGRESS NOTES
HISTORY OF PRESENT ILLNESS  Irene Mitchell is a 75 y.o. female. HPI  Cardiovascular Review:  The patient has hypertension, Palpitations and obesity Body mass index is 33.81 kg/(m^2). Palpitations evaluation by Dr. Chapo Lopez was unremarkable. 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 Home BP Monitoring: is not measured at home. Pertinent ROS: taking medications as instructed, no medication side effects noted, no TIA's, no chest pain on exertion, no dyspnea on exertion, no swelling of ankles. Grief  Still 'missing Colin\" doing well. Has strong support system. PHQ over the last two weeks 7/18/2018   Little interest or pleasure in doing things Not at all   Feeling down, depressed, irritable, or hopeless Not at all   Total Score PHQ 2 0       SHx: Grandsons visiting from [place]. Spent 6 weeks in [place] April-->May 2018     This is the Subsequent Medicare Annual Wellness Exam, performed 12 months or more after the Initial AWV or the last Subsequent AWV    I have reviewed the patient's medical history in detail and updated the computerized patient record. History     Past Medical History:   Diagnosis Date    Hypertension     Thyroid nodule     annual eval with US      No past surgical history on file. Current Outpatient Prescriptions   Medication Sig Dispense Refill    aspirin 81 mg chewable tablet Take 81 mg by mouth daily. Patient states she \"usually takes\" a baby aspirin daily      triamterene-hydroCHLOROthiazide (MAXZIDE) 37.5-25 mg per tablet TAKE 1 TABLET BY MOUTH TWICE A  Tab 1    omega-3 fatty acids-vitamin e 1,000 mg cap Take 1 Cap by mouth.  OTHER \"tumeric\"      CALCIUM CARBONATE/VITAMIN D3 (CALCIUM + D PO) Take  by mouth daily.  multivitamin (ONE A DAY) tablet Take 1 Tab by mouth daily.  meloxicam (MOBIC) 15 mg tablet TAKE 1 TABLET (15 MG TOTAL) BY MOUTH DAILY.   2     No Known Allergies  Family History   Problem Relation Age of Onset    Cancer Mother      Colon [place]    Hypertension Mother     Heart Disease Father      heart attack age 52, smoker    Hypertension Father     Cancer Sister      Colon [place]    Hypertension Sister     Cancer Brother      Esophageal    Hypertension Brother     Cancer Brother      Multiple myeloma    Hypertension Brother     Heart Disease Brother     Hypertension Brother     Hypertension Brother      Social History   Substance Use Topics    Smoking status: Never Smoker    Smokeless tobacco: Never Used    Alcohol use 1.0 oz/week     2 Standard drinks or equivalent per week     Patient Active Problem List   Diagnosis Code    Hypertension I10    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 Functional Ability and Level of Safety:   Hearing Loss  Hearing is good. Activities of Daily Living  The home contains: no safety equipment. Patient does total self care    Fall Risk  Fall Risk Assessment, last 12 mths 7/18/2018   Able to walk? Yes   Fall in past 12 months? No       Abuse Screen  Patient is not abused    Cognitive Screening   Evaluation of Cognitive Function:  Has your family/caregiver stated any concerns about your memory: no      Patient Care Team   Patient Care Team:  Nicole Mancera MD as PCP - General (Internal Medicine)  Jyothi Ashley MD (Cardiology)      Review of Systems   Musculoskeletal: Positive for joint pain (knee pain has been seen by Orthopedics.  Did not stop mobic. ). Physical Exam   Constitutional: She is oriented to person, place, and time. She appears well-developed and well-nourished. HENT:   Right Ear: External ear normal.   Left Ear: External ear normal.   Mouth/Throat: Oropharynx is clear and moist. No oropharyngeal exudate. Eyes: Conjunctivae are normal. No scleral icterus. Neck: Normal range of motion. Neck supple. No thyromegaly present. Cardiovascular: Normal rate, regular rhythm and normal heart sounds. Exam reveals no gallop and no friction rub. No murmur heard. Pulmonary/Chest: Effort normal and breath sounds normal. No respiratory distress. She has no wheezes. She has no rales. She exhibits no tenderness. Abdominal: Soft. Bowel sounds are normal. She exhibits no distension. There is no tenderness. There is no rebound and no guarding. Musculoskeletal: Normal range of motion. She exhibits no edema or tenderness. Neurological: She is alert and oriented to person, place, and time. Psychiatric: She has a normal mood and affect. ASSESSMENT and PLAN  Assessment/Plan   Education and counseling provided:  Diagnoses and all orders for this visit:    1. Medicare annual wellness visit, subsequent- Health Maintenance reviewed       2. Class 1 obesity without serious comorbidity with body mass index (BMI) of 34.0 to 34.9 in adult, unspecified obesity type- I have reviewed/discussed the above normal BMI with the patient. I have recommended the following interventions: dietary management education, guidance, and counseling . Jd Tilley Plans to join [employer]. 3. Essential hypertension- well controlled. Counseled on diet and exercise. Continue current regimen.   -     METABOLIC PANEL, COMPREHENSIVE  -     LIPID PANEL    4. Primary osteoarthritis of both knees- would like to try a topical agent as opposed to meloxicam prescribed by Orthopedics  This is appropriate given her medical history.   -     diclofenac (VOLTAREN) 1 % gel; Apply  to affected area three (3) times daily as needed for Pain.  -     CBC WITH AUTOMATED DIFF  -     RA + CCP ABS  -     SED RATE (ESR)    5. Abnormal finding of blood chemistry   -     CBC WITH AUTOMATED DIFF    6. Encounter for immunization- received in office without complication  -     ADMIN PNEUMOCOCCAL VACCINE  -     PNEUMOCOCCAL POLYSACCHARIDE VACCINE, 23-VALENT, ADULT OR IMMUNOSUPPRESSED PT DOSE,    7. Grief- appears to be doing well. Strong support system. Follow-up Disposition:  Return in about 4 months (around 11/18/2018) for Follow-up, Hypertension. Medication risks/benefits/costs/interactions/alternatives discussed with patient. Bhakti Bacon  was given an after visit summary which includes diagnoses, current medications, & vitals. she expressed understanding with the diagnosis and plan.

## 2018-07-18 NOTE — PROGRESS NOTES
Chief Complaint   Patient presents with   [facility] [address] Visit     physical     Patient states she is here for her AWV physical.

## 2018-07-18 NOTE — ACP (ADVANCE CARE PLANNING)
Advance Care Planning (ACP) Provider Note - Comprehensive      Date of ACP Conversation: 07/18/18  Persons included in Conversation:  patient  Length of ACP Conversation in minutes:  16 minutes     Authorized Decision Maker (if patient is incapable of making informed decisions):    This person is:  Daughter, Charity Montoya for ALL Patients with Decision Making Capacity:   Importance of advance care planning, including choosing a healthcare agent to communicate patient's healthcare decisions if patient lost the ability to make decisions, such as after a sudden illness or accident     Review of Existing Advance Directive:  Document unavailable but she will bring it to office      For Serious or Chronic Illness:  Understanding of medical condition       Interventions Provided:  Recommended review of completed ACP document annually or upon change in health status Currently Full Code

## 2018-07-18 NOTE — MR AVS SNAPSHOT
[address] 
[phone] Patient: Leona Coppola MRN: [id number] ILQ:[id number] Visit Information Date & Time Provider Department Dept. Phone Encounter #  
 7/18/2018  1:30 PM Ana Mock MD [address] [address] Internal Medicine [phone] [id number] Follow-up Instructions Return in about 4 months (around 11/18/2018) for Follow-up, Hypertension. Upcoming Health Maintenance Date Due Pneumococcal 65+ Low/Medium Risk (2 of 2 - PPSV23) 5/17/2018 Influenza Age 5 to Adult 8/1/2018 MEDICARE YEARLY EXAM 7/19/2019 GLAUCOMA SCREENING Q2Y 8/22/2019 BREAST CANCER SCRN MAMMOGRAM 9/25/2019 COLONOSCOPY 3/30/2021 DTaP/Tdap/Td series (2 - Td) 4/5/2026 Allergies as of 7/18/2018  Review Complete On: 7/18/2018 By: Ana Mock MD  
 No Known Allergies Current Immunizations  Never Reviewed Name Date Influenza High Dose Vaccine PF 10/13/2015 Pneumococcal Conjugate (PCV-13) 5/17/2017 Tdap 4/5/2016 10:30 AM  
 Zoster Vaccine, Live 5/17/2017 Not reviewed this visit You Were Diagnosed With   
  
 Codes Comments Medicare annual wellness visit, subsequent    -  Primary ICD-10-CM: Z00.00 ICD-9-CM: V70.0 Class 1 obesity without serious comorbidity with body mass index (BMI) of 34.0 to 34.9 in adult, unspecified obesity type     ICD-10-CM: E66.9, Z68.34 
ICD-9-CM: 278.00, V85.34 Essential hypertension     ICD-10-CM: I10 
ICD-9-CM: 401.9 Primary osteoarthritis of both knees     ICD-10-CM: M17.0 ICD-9-CM: 715.16 Abnormal finding of blood chemistry     ICD-10-CM: R79.9 ICD-9-CM: 790.6 Vitals BP Pulse Temp Resp Height(growth percentile) Weight(growth percentile) 130/80 (BP 1 Location: Left arm, BP Patient Position: Sitting) 76 98.5 °F (36.9 °C) (Oral) 16 5' 4\" (1.626 m) 197 lb (89.4 kg) SpO2 BMI OB Status Smoking Status 97% 33.81 kg/m2 Postmenopausal Never Smoker BMI and BSA Data Body Mass Index Body Surface Area  
 33.81 kg/m 2 2.01 m 2 Preferred Pharmacy Pharmacy Name Phone [id number] [place] - [address] [phone] Your Updated Medication List  
  
   
This list is accurate as of 7/18/18  2:19 PM.  Always use your most recent med list.  
  
  
  
  
 aspirin 81 mg chewable tablet Take 81 mg by mouth daily. Patient states she \"usually takes\" a baby aspirin daily CALCIUM + D PO Take  by mouth daily. diclofenac 1 % Gel Commonly known as:  VOLTAREN Apply  to affected area three (3) times daily as needed for Pain.  
  
 multivitamin tablet Commonly known as:  ONE A DAY Take 1 Tab by mouth daily. omega-3 fatty acids-vitamin e 1,000 mg Cap Take 1 Cap by mouth. OTHER \"tumeric\"  
  
 triamterene-hydroCHLOROthiazide 37.5-25 mg per tablet Commonly known as:  Noreen Arsenionickolas TAKE 1 TABLET BY MOUTH TWICE A DAY Prescriptions Sent to Pharmacy Refills  
 diclofenac (VOLTAREN) 1 % gel 0 Sig: Apply  to affected area three (3) times daily as needed for Pain. Class: Normal  
 Pharmacy: [facility]/pharmacy [id number] [place] [address] [address]  #: [phone] Route: Topical  
  
We Performed the Following CBC WITH AUTOMATED DIFF [50992 CPT(R)] LIPID PANEL [17096 CPT(R)] METABOLIC PANEL, COMPREHENSIVE [01491 CPT(R)]   
 RA + CCP ABS [[id number] Custom] SED RATE (ESR) [id number] CPT(R)] Follow-up Instructions Return in about 4 months (around 11/18/2018) for Follow-up, Hypertension. Patient Instructions It was a pleasure to see you! As discussed: 
 
I am glad that you have a strong support system as you miss Mr. Danya Davis Alaina Blackmatthew). I have ordered your age appropriate labs please complete them.  You will need to fast 10-12hrs before your lab appointment. Complete labs two weeks before your next appointment. High Blood Pressure (BP) Well controlled today 
-Continue to check your BP at home  
-Follow a low sodium diet (<1500mg/ day) -Exercise regularly goal, 150 minutes of cardiovascular exercise/ week 
-If your blood pressure is too low (<90/60) or too high (>180/100) or you have any symptoms such as chest pain, dizziness, shortness of breath- seek immediate medical attention. See  Www.health.gov for more information. Well Visit, Over 72: Care Instructions Your Care Instructions Physical exams can help you stay healthy. Your doctor has checked your overall health and may have suggested ways to take good care of yourself. He or she also may have recommended tests. At home, you can help prevent illness with healthy eating, regular exercise, and other steps. Follow-up care is a key part of your treatment and safety. Be sure to make and go to all appointments, and call your doctor if you are having problems. It's also a good idea to know your test results and keep a list of the medicines you take. How can you care for yourself at home? · Reach and stay at a healthy weight. This will lower your risk for many problems, such as obesity, diabetes, heart disease, and high blood pressure. · Get at least 30 minutes of exercise on most days of the week. Walking is a good choice. You also may want to do other activities, such as running, swimming, cycling, or playing tennis or team sports. · Do not smoke. Smoking can make health problems worse. If you need help quitting, talk to your doctor about stop-smoking programs and medicines. These can increase your chances of quitting for good. · Protect your skin from too much sun. When you're outdoors from 10 a.m. to 4 p.m., stay in the shade or cover up with clothing and a hat with a wide brim. Wear sunglasses that block UV rays.  Even when it's cloudy, put broad-spectrum sunscreen (SPF 30 or higher) on any exposed skin. · See a dentist one or two times a year for checkups and to have your teeth cleaned. · Wear a seat belt in the car. · Limit alcohol to 2 drinks a day for men and 1 drink a day for women. Too much alcohol can cause health problems. Follow your doctor's advice about when to have certain tests. These tests can spot problems early. For men and women · Cholesterol. Your doctor will tell you how often to have this done based on your overall health and other things that can increase your risk for heart attack and stroke. · Blood pressure. Have your blood pressure checked during a routine doctor visit. Your doctor will tell you how often to check your blood pressure based on your age, your blood pressure results, and other factors. · Diabetes. Ask your doctor whether you should have tests for diabetes. · Vision. Experts recommend that you have yearly exams for glaucoma and other age-related eye problems. · Hearing. Tell your doctor if you notice any change in your hearing. You can have tests to find out how well you hear. · Colon cancer tests. Keep having colon cancer tests as your doctor recommends. You can have one of several types of tests. · Heart attack and stroke risk. At least every 4 to 6 years, you should have your risk for heart attack and stroke assessed. Your doctor uses factors such as your age, blood pressure, cholesterol, and whether you smoke or have diabetes to show what your risk for a heart attack or stroke is over the next 10 years. · Osteoporosis. Talk to your doctor about whether you should have a bone density test to find out whether you have thinning bones. Also ask your doctor about whether you should take calcium and vitamin D supplements. For women · Pap test and pelvic exam. You may no longer need a Pap test. Talk with your doctor about whether to stop or continue to have Pap tests. · Breast exam and mammogram. Ask how often you should have a mammogram, which is an X-ray of your breasts. A mammogram can spot breast cancer before it can be felt and when it is easiest to treat. · Thyroid disease. Talk to your doctor about whether to have your thyroid checked as part of a regular physical exam. Women have an increased chance of a thyroid problem. For men · Prostate exam. Talk to your doctor about whether you should have a blood test (called a PSA test) for prostate cancer. Experts disagree on whether men should have this test. Some experts recommend that you discuss the benefits and risks of the test with your doctor. · Abdominal aortic aneurysm. Ask your doctor whether you should have a test to check for an aneurysm. You may need a test if you ever smoked or if your parent, brother, sister, or child has had an aneurysm. When should you call for help? Watch closely for changes in your health, and be sure to contact your doctor if you have any problems or symptoms that concern you. Where can you learn more? Go to http://roman-kimberly.info/. Enter O438 in the search box to learn more about \"Well Visit, Over 65: Care Instructions. \" Current as of: May 16, 2017 Content Version: 11.7 © 1086-1984 [employer]. Care instructions adapted under license by [employer] (which disclaims liability or warranty for this information). If you have questions about a medical condition or this instruction, always ask your healthcare professional. [place] [address] any warranty or liability for your use of this information. Introducing [facility]! Dear Ajay Bennett: Thank you for requesting a [employer] account. Our records indicate that you already have an active [employer] account. You can access your account anytime at [email or web address] Did you know that you can access your hospital and ER discharge instructions at any time in [employer]? You can also review all of your test results from your hospital stay or ER visit. Additional Information If you have questions, please visit the Frequently Asked Questions section of the [employer] website at [email or web address] Remember, Iceotope is NOT to be used for urgent needs. For medical emergencies, dial 911. Now available from your iPhone and Android! Please provide this summary of care documentation to your next provider. Your primary care clinician is listed as Eliza Schaumann. If you have any questions after today's visit, please call [phone].

## 2018-07-18 NOTE — PATIENT INSTRUCTIONS
It was a pleasure to see you! As discussed: 
 
I am glad that you have a strong support system as you miss Mr. Michelle Mayo Kaiden Araujo). I have ordered your age appropriate labs please complete them. You will need to fast 10-12hrs before your lab appointment. Complete labs two weeks before your next appointment. High Blood Pressure (BP) Well controlled today 
-Continue to check your BP at home  
-Follow a low sodium diet (<1500mg/ day) -Exercise regularly goal, 150 minutes of cardiovascular exercise/ week 
-If your blood pressure is too low (<90/60) or too high (>180/100) or you have any symptoms such as chest pain, dizziness, shortness of breath- seek immediate medical attention. See  Www.health.gov for more information. Well Visit, Over 72: Care Instructions Your Care Instructions Physical exams can help you stay healthy. Your doctor has checked your overall health and may have suggested ways to take good care of yourself. He or she also may have recommended tests. At home, you can help prevent illness with healthy eating, regular exercise, and other steps. Follow-up care is a key part of your treatment and safety. Be sure to make and go to all appointments, and call your doctor if you are having problems. It's also a good idea to know your test results and keep a list of the medicines you take. How can you care for yourself at home? · Reach and stay at a healthy weight. This will lower your risk for many problems, such as obesity, diabetes, heart disease, and high blood pressure. · Get at least 30 minutes of exercise on most days of the week. Walking is a good choice. You also may want to do other activities, such as running, swimming, cycling, or playing tennis or team sports. · Do not smoke. Smoking can make health problems worse. If you need help quitting, talk to your doctor about stop-smoking programs and medicines. These can increase your chances of quitting for good.  
· Protect your skin from too much sun. When you're outdoors from 10 a.m. to 4 p.m., stay in the shade or cover up with clothing and a hat with a wide brim. Wear sunglasses that block UV rays. Even when it's cloudy, put broad-spectrum sunscreen (SPF 30 or higher) on any exposed skin. · See a dentist one or two times a year for checkups and to have your teeth cleaned. · Wear a seat belt in the car. · Limit alcohol to 2 drinks a day for men and 1 drink a day for women. Too much alcohol can cause health problems. Follow your doctor's advice about when to have certain tests. These tests can spot problems early. For men and women · Cholesterol. Your doctor will tell you how often to have this done based on your overall health and other things that can increase your risk for heart attack and stroke. · Blood pressure. Have your blood pressure checked during a routine doctor visit. Your doctor will tell you how often to check your blood pressure based on your age, your blood pressure results, and other factors. · Diabetes. Ask your doctor whether you should have tests for diabetes. · Vision. Experts recommend that you have yearly exams for glaucoma and other age-related eye problems. · Hearing. Tell your doctor if you notice any change in your hearing. You can have tests to find out how well you hear. · Colon cancer tests. Keep having colon cancer tests as your doctor recommends. You can have one of several types of tests. · Heart attack and stroke risk. At least every 4 to 6 years, you should have your risk for heart attack and stroke assessed. Your doctor uses factors such as your age, blood pressure, cholesterol, and whether you smoke or have diabetes to show what your risk for a heart attack or stroke is over the next 10 years. · Osteoporosis. Talk to your doctor about whether you should have a bone density test to find out whether you have thinning bones.  Also ask your doctor about whether you should take calcium and vitamin D supplements. For women · Pap test and pelvic exam. You may no longer need a Pap test. Talk with your doctor about whether to stop or continue to have Pap tests. · Breast exam and mammogram. Ask how often you should have a mammogram, which is an X-ray of your breasts. A mammogram can spot breast cancer before it can be felt and when it is easiest to treat. · Thyroid disease. Talk to your doctor about whether to have your thyroid checked as part of a regular physical exam. Women have an increased chance of a thyroid problem. For men · Prostate exam. Talk to your doctor about whether you should have a blood test (called a PSA test) for prostate cancer. Experts disagree on whether men should have this test. Some experts recommend that you discuss the benefits and risks of the test with your doctor. · Abdominal aortic aneurysm. Ask your doctor whether you should have a test to check for an aneurysm. You may need a test if you ever smoked or if your parent, brother, sister, or child has had an aneurysm. When should you call for help? Watch closely for changes in your health, and be sure to contact your doctor if you have any problems or symptoms that concern you. Where can you learn more? Go to http://roman-kimberly.info/. Enter N433 in the search box to learn more about \"Well Visit, Over 65: Care Instructions. \" Current as of: May 16, 2017 Content Version: 11.7 © 0604-8433 [employer]. Care instructions adapted under license by [employer] (which disclaims liability or warranty for this information). If you have questions about a medical condition or this instruction, always ask your healthcare professional. [place] [address] any warranty or liability for your use of this information.

## 2018-10-01 ENCOUNTER — HOSPITAL ENCOUNTER (OUTPATIENT)
Dept: MAMMOGRAPHY | Age: 68
Discharge: HOME | End: 2018-10-01
Attending: INTERNAL MEDICINE
Payer: MEDICARE

## 2018-10-01 ENCOUNTER — OFFICE VISIT (OUTPATIENT)
Dept: FAMILY MEDICINE CLINIC | Age: 68
End: 2018-10-01

## 2018-10-01 VITALS — TEMPERATURE: 96.8 F

## 2018-10-01 DIAGNOSIS — Z23 ENCOUNTER FOR IMMUNIZATION: Primary | ICD-10-CM

## 2018-10-01 DIAGNOSIS — Z12.39 SCREENING BREAST EXAMINATION: ICD-10-CM

## 2018-10-01 PROCEDURE — 77067 SCR MAMMO BI INCL CAD: CPT

## 2018-10-01 NOTE — PATIENT INSTRUCTIONS
Vaccine Information Statement    Influenza (Flu) Vaccine (Inactivated or Recombinant): What you need to know    Many Vaccine Information Statements are available in [place] and other languages. See www.immunize.org/vis  Hojas de Información Sobre Vacunas están disponibles en Español y en muchos otros idiomas. Visite www.immunize.org/vis    1. Why get vaccinated? Influenza (flu) is a contagious disease that spreads around the [place] every year, usually between October and May. Flu is caused by influenza viruses, and is spread mainly by coughing, sneezing, and close contact. Anyone can get flu. Flu strikes suddenly and can last several days. Symptoms vary by age, but can include:   fever/chills   sore throat   muscle aches   fatigue   cough   headache    runny or stuffy nose    Flu can also lead to pneumonia and blood infections, and cause diarrhea and seizures in children. If you have a medical condition, such as heart or lung disease, flu can make it worse. Flu is more dangerous for some people. Infants and young children, people 72years of age and older, pregnant women, and people with certain health conditions or a weakened immune system are at greatest risk. Each year thousands of people in the [employer] die from flu, and many more are hospitalized. Flu vaccine can:   keep you from getting flu,   make flu less severe if you do get it, and   keep you from spreading flu to your family and other people. 2. Inactivated and recombinant flu vaccines    A dose of flu vaccine is recommended every flu season. Children 6 months through 6years of age may need two doses during the same flu season. Everyone else needs only one dose each flu season.        Some inactivated flu vaccines contain a very small amount of a mercury-based preservative called thimerosal. Studies have not shown thimerosal in vaccines to be harmful, but flu vaccines that do not contain thimerosal are available. There is no live flu virus in flu shots. They cannot cause the flu. There are many flu viruses, and they are always changing. Each year a new flu vaccine is made to protect against three or four viruses that are likely to cause disease in the upcoming flu season. But even when the vaccine doesnt exactly match these viruses, it may still provide some protection    Flu vaccine cannot prevent:   flu that is caused by a virus not covered by the vaccine, or   illnesses that look like flu but are not. It takes about 2 weeks for protection to develop after vaccination, and protection lasts through the flu season. 3. Some people should not get this vaccine    Tell the person who is giving you the vaccine:     If you have any severe, life-threatening allergies. If you ever had a life-threatening allergic reaction after a dose of flu vaccine, or have a severe allergy to any part of this vaccine, you may be advised not to get vaccinated. Most, but not all, types of flu vaccine contain a small amount of egg protein.  If you ever had Guillain-Barré Syndrome (also called GBS). Some people with a history of GBS should not get this vaccine. This should be discussed with your doctor.  If you are not feeling well. It is usually okay to get flu vaccine when you have a mild illness, but you might be asked to come back when you feel better. 4. Risks of a vaccine reaction    With any medicine, including vaccines, there is a chance of reactions. These are usually mild and go away on their own, but serious reactions are also possible. Most people who get a flu shot do not have any problems with it.      Minor problems following a flu shot include:    soreness, redness, or swelling where the shot was given     hoarseness   sore, red or itchy eyes   cough   fever   aches   headache   itching   fatigue  If these problems occur, they usually begin soon after the shot and last 1 or 2 days. More serious problems following a flu shot can include the following:     There may be a small increased risk of Guillain-Barré Syndrome (GBS) after inactivated flu vaccine. This risk has been estimated at 1 or 2 additional cases per million people vaccinated. This is much lower than the risk of severe complications from flu, which can be prevented by flu vaccine.  Young children who get the flu shot along with pneumococcal vaccine (PCV13) and/or DTaP vaccine at the same time might be slightly more likely to have a seizure caused by fever. Ask your doctor for more information. Tell your doctor if a child who is getting flu vaccine has ever had a seizure. Problems that could happen after any injected vaccine:      People sometimes faint after a medical procedure, including vaccination. Sitting or lying down for about 15 minutes can help prevent fainting, and injuries caused by a fall. Tell your doctor if you feel dizzy, or have vision changes or ringing in the ears.  Some people get severe pain in the shoulder and have difficulty moving the arm where a shot was given. This happens very rarely.  Any medication can cause a severe allergic reaction. Such reactions from a vaccine are very rare, estimated at about 1 in a million doses, and would happen within a few minutes to a few hours after the vaccination. As with any medicine, there is a very remote chance of a vaccine causing a serious injury or death. The safety of vaccines is always being monitored. For more information, visit: www.cdc.gov/vaccinesafety/    5. What if there is a serious reaction? What should I look for?  Look for anything that concerns you, such as signs of a severe allergic reaction, very high fever, or unusual behavior.     Signs of a severe allergic reaction can include hives, swelling of the face and throat, difficulty breathing, a fast heartbeat, dizziness, and weakness - usually within a few minutes to a few hours after the vaccination. What should I do?  If you think it is a severe allergic reaction or other emergency that cant wait, call 9-1-1 and get the person to the nearest hospital. Otherwise, call your doctor.  Reactions should be reported to the Vaccine Adverse Event Reporting System (VAERS). Your doctor should file this report, or you can do it yourself through  the VAERS web site at www.vaers. [facility].gov, or by calling 9-689.723.9910. VAERS does not give medical advice. 6. The National Vaccine Injury Compensation Program    The [facility] Vaccine Injury Compensation Program (VICP) is a federal program that was created to compensate people who may have been injured by certain vaccines. Persons who believe they may have been injured by a vaccine can learn about the program and about filing a claim by calling 0-730.432.8746 or visiting the [employer] website at www.[facility].gov/vaccinecompensation. There is a time limit to file a claim for compensation. 7. How can I learn more?  Ask your healthcare provider. He or she can give you the vaccine package insert or suggest other sources of information.  Call your local or state health department.  Contact the Centers for Disease Control and Prevention (CDC):  - Call 8-768.674.3999 (1-800-CDC-INFO) or  - Visit CDCs website at www.cdc.gov/flu    Vaccine Information Statement   Inactivated Influenza Vaccine   8/7/2015  42 LISSETTE Lama [id number]    Department of Health and Human Services  Centers for Disease Control and Prevention    Office Use Only

## 2018-11-06 LAB
ALBUMIN SERPL-MCNC: 4.1 G/DL (ref 3.6–4.8)
ALBUMIN/GLOB SERPL: 1.5 {RATIO} (ref 1.2–2.2)
ALP SERPL-CCNC: 83 IU/L (ref 39–117)
ALT SERPL-CCNC: 15 IU/L (ref 0–32)
AST SERPL-CCNC: 19 IU/L (ref 0–40)
BASOPHILS # BLD AUTO: 0 X10E3/UL (ref 0–0.2)
BASOPHILS NFR BLD AUTO: 1 %
BILIRUB SERPL-MCNC: 0.5 MG/DL (ref 0–1.2)
BUN SERPL-MCNC: 15 MG/DL (ref 8–27)
BUN/CREAT SERPL: 15 (ref 12–28)
CALCIUM SERPL-MCNC: 9.6 MG/DL (ref 8.7–10.3)
CCP IGA+IGG SERPL IA-ACNC: 6 UNITS (ref 0–19)
CHLORIDE SERPL-SCNC: 100 MMOL/L (ref 96–106)
CHOLEST SERPL-MCNC: 184 MG/DL (ref 100–199)
CO2 SERPL-SCNC: 25 MMOL/L (ref 20–29)
CREAT SERPL-MCNC: 0.99 MG/DL (ref 0.57–1)
EOSINOPHIL # BLD AUTO: 0.1 X10E3/UL (ref 0–0.4)
EOSINOPHIL NFR BLD AUTO: 1 %
ERYTHROCYTE [DISTWIDTH] IN BLOOD BY AUTOMATED COUNT: 13.6 % (ref 12.3–15.4)
ERYTHROCYTE [SEDIMENTATION RATE] IN BLOOD BY WESTERGREN METHOD: 20 MM/HR (ref 0–40)
GLOBULIN SER CALC-MCNC: 2.8 G/DL (ref 1.5–4.5)
GLUCOSE SERPL-MCNC: 94 MG/DL (ref 65–99)
HCT VFR BLD AUTO: 37 % (ref 34–46.6)
HDLC SERPL-MCNC: 78 MG/DL
HGB BLD-MCNC: 12 G/DL (ref 11.1–15.9)
IMM GRANULOCYTES # BLD: 0 X10E3/UL (ref 0–0.1)
IMM GRANULOCYTES NFR BLD: 0 %
LDLC SERPL CALC-MCNC: 93 MG/DL (ref 0–99)
LYMPHOCYTES # BLD AUTO: 2 X10E3/UL (ref 0.7–3.1)
LYMPHOCYTES NFR BLD AUTO: 46 %
MCH RBC QN AUTO: 27.6 PG (ref 26.6–33)
MCHC RBC AUTO-ENTMCNC: 32.4 G/DL (ref 31.5–35.7)
MCV RBC AUTO: 85 FL (ref 79–97)
MONOCYTES # BLD AUTO: 0.4 X10E3/UL (ref 0.1–0.9)
MONOCYTES NFR BLD AUTO: 8 %
NEUTROPHILS # BLD AUTO: 1.9 X10E3/UL (ref 1.4–7)
NEUTROPHILS NFR BLD AUTO: 44 %
PLATELET # BLD AUTO: 246 X10E3/UL (ref 150–379)
POTASSIUM SERPL-SCNC: 4.2 MMOL/L (ref 3.5–5.2)
PROT SERPL-MCNC: 6.9 G/DL (ref 6–8.5)
RBC # BLD AUTO: 4.35 X10E6/UL (ref 3.77–5.28)
RHEUMATOID FACT SERPL-ACNC: <10 IU/ML (ref 0–13.9)
SODIUM SERPL-SCNC: 142 MMOL/L (ref 134–144)
TRIGL SERPL-MCNC: 67 MG/DL (ref 0–149)
VLDLC SERPL CALC-MCNC: 13 MG/DL (ref 5–40)
WBC # BLD AUTO: 4.4 X10E3/UL (ref 3.4–10.8)

## 2018-11-15 ENCOUNTER — OFFICE VISIT (OUTPATIENT)
Dept: INTERNAL MEDICINE CLINIC | Age: 68
End: 2018-11-15

## 2018-11-15 VITALS
RESPIRATION RATE: 18 BRPM | HEART RATE: 84 BPM | SYSTOLIC BLOOD PRESSURE: 144 MMHG | OXYGEN SATURATION: 98 % | BODY MASS INDEX: 34.49 KG/M2 | HEIGHT: 64 IN | WEIGHT: 202 LBS | TEMPERATURE: 98.1 F | DIASTOLIC BLOOD PRESSURE: 85 MMHG

## 2018-11-15 DIAGNOSIS — R20.2 NUMBNESS AND TINGLING OF HAND: ICD-10-CM

## 2018-11-15 DIAGNOSIS — I10 ESSENTIAL HYPERTENSION: Primary | ICD-10-CM

## 2018-11-15 DIAGNOSIS — E66.9 CLASS 1 OBESITY WITHOUT SERIOUS COMORBIDITY WITH BODY MASS INDEX (BMI) OF 34.0 TO 34.9 IN ADULT, UNSPECIFIED OBESITY TYPE: ICD-10-CM

## 2018-11-15 DIAGNOSIS — Z23 ENCOUNTER FOR IMMUNIZATION: ICD-10-CM

## 2018-11-15 DIAGNOSIS — F43.21 GRIEF: ICD-10-CM

## 2018-11-15 DIAGNOSIS — R20.0 NUMBNESS AND TINGLING OF HAND: ICD-10-CM

## 2018-11-15 NOTE — PROGRESS NOTES
HISTORY OF PRESENT ILLNESS  Janak Gill is a 70 y.o. female. HPI  Cardiovascular Review  The patient has hypertension, hyperlipidemia and obesity. Diet and Lifestyle: not attempting to follow a low fat, low cholesterol diet, exercises sporadically [place], physically active lifestyle. She plans to join gym. Has treadmill on gym. Home BP Monitoring: is not measured at home. Pertinent ROS: no TIA's, no chest pain on exertion, no dyspnea on exertion, no swelling of ankles, taking medications as instructed\",no medication side effects noted. Grief   She has had 3 deaths in past year. She is approaching the one year anniversary of her 's death. She has not been in grief counseling. Has strong support from family. PHQ over the last two weeks 11/15/2018   Little interest or pleasure in doing things Not at all   Feeling down, depressed, irritable, or hopeless Not at all   Total Score PHQ 2 0     Neurology Review   Tremor- reports tremor when writing RUE. No known family history of trauma. Also has finger numbness for several years. Reports remote shoulder injury. SHx: Plans to go to [place] in Spring. Review of Systems   Musculoskeletal: Positive for joint pain (knee pain -RLE knee pain ). Skin: Positive for rash. per HPI   Patient Active Problem List    Diagnosis Date Noted    Palpitations 06/04/2018    Left atrial enlargement 02/27/2018    Encounter for colonoscopy due to history of adenomatous colonic polyps 03/30/2016    Change in bowel habits 03/30/2016    Obesity 06/15/2015    Hypertension 12/02/2014    Multinodular non-toxic goiter 12/02/2014       Current Outpatient Medications   Medication Sig Dispense Refill    triamterene-hydroCHLOROthiazide (MAXZIDE) 37.5-25 mg per tablet TAKE 1 TABLET BY MOUTH TWICE A  Tab 2    aspirin 81 mg chewable tablet Take 81 mg by mouth daily.  Patient states she \"usually takes\" a baby aspirin daily      omega-3 fatty acids-vitamin e 1,000 mg cap Take 1 Cap by mouth.  OTHER \"tumeric\"      CALCIUM CARBONATE/VITAMIN D3 (CALCIUM + D PO) Take  by mouth daily.  multivitamin (ONE A DAY) tablet Take 1 Tab by mouth daily.  diclofenac (VOLTAREN) 1 % gel Apply  to affected area three (3) times daily as needed for Pain. 100 g 0       No Known Allergies   Visit Vitals  /85 (BP 1 Location: Right arm, BP Patient Position: Sitting)   Pulse 84   Temp 98.1 °F (36.7 °C) (Oral)   Resp 18   Ht 5' 4\" (1.626 m)   Wt 202 lb (91.6 kg)   SpO2 98%   BMI 34.67 kg/m²       Physical Exam   Constitutional: She is oriented to person, place, and time. No distress. Musculoskeletal: She exhibits no edema (BLE). Cervical back: She exhibits normal range of motion, no tenderness and no bony tenderness. BUE: Negative tinnel's test   Negative Phalen's sign   Neurological: She is alert and oriented to person, place, and time. Psychiatric:   Blunted affect      Lab Results   Component Value Date/Time    WBC 4.4 11/05/2018 09:30 AM    HGB 12.0 11/05/2018 09:30 AM    HCT 37.0 11/05/2018 09:30 AM    PLATELET 358 33/29/7011 09:30 AM    MCV 85 11/05/2018 09:30 AM     Lab Results   Component Value Date/Time    Cholesterol, total 184 11/05/2018 09:30 AM    HDL Cholesterol 78 11/05/2018 09:30 AM    LDL, calculated 93 11/05/2018 09:30 AM    Triglyceride 67 11/05/2018 09:30 AM    CHOL/HDL Ratio 2.6 01/11/2010 10:53 AM     Lab Results   Component Value Date/Time    ALT (SGPT) 15 11/05/2018 09:30 AM    AST (SGOT) 19 11/05/2018 09:30 AM    Alk.  phosphatase 83 11/05/2018 09:30 AM    Bilirubin, total 0.5 11/05/2018 09:30 AM    Albumin 4.1 11/05/2018 09:30 AM    Protein, total 6.9 11/05/2018 09:30 AM    PLATELET 445 75/84/1350 09:30 AM     Lab Results   Component Value Date/Time    GFR est non-AA 58 (L) 11/05/2018 09:30 AM    GFR est AA 67 11/05/2018 09:30 AM    Creatinine 0.99 11/05/2018 09:30 AM    BUN 15 11/05/2018 09:30 AM    Sodium 142 11/05/2018 09:30 AM Potassium 4.2 11/05/2018 09:30 AM    Chloride 100 11/05/2018 09:30 AM    CO2 25 11/05/2018 09:30 AM    Magnesium 1.9 02/27/2018 11:16 AM     Lab Results   Component Value Date/Time    TSH 1.310 02/27/2018 10:30 AM    T3 Uptake 27 02/27/2018 10:30 AM    T4, Free 1.24 03/20/2015 10:38 AM    T4, Total 7.2 02/27/2018 10:30 AM      Lab Results   Component Value Date/Time    Glucose 94 11/05/2018 09:30 AM         ASSESSMENT and PLAN  Diagnoses and all orders for this visit:    1. Essential hypertension- well controlled. Counseled on diet and exercise. Continue current regimen. Recent labs at goal    2. Class 1 obesity without serious comorbidity with body mass index (BMI) of 34.0 to 34.9 in adult, unspecified obesity type-contemplative about weight loss. Plans to join a local gym. She is also working on her eating habits. 3. Numbness and tingling of handfocal in nature. No signs and symptoms of carpal tunnel on exam.  Seems more similar to a local nerve damage. May benefit from seeing neurology for optimization of symptoms.  -     REFERRAL TO NEUROLOGY    4. Grief-he has experienced several significant losses in the past year and has had little time to grieve in between each loss. She is contemplative about going to grief counseling but more open to the idea that she has been in the past.  She was given a list of resources should she decide to see a specialist. No evidence of  exam.  Patient Education:  Reviewed concept of depression as biochemical imbalance of neurotransmitters and rationale for treatment. Instructed patient to contact office or 911 promptly should condition worsen or any new symptoms appear and provided on-call telephone numbers. IF THE PATIENT HAS ANY SUICIDAL OR HOMICIDAL IDEATION, CALL THE OFFICE, DISCUSS WITH A SUPPORT MEMBER OR GO TO THE ER IMMEDIATELY. Patient was agreeable with this      5.  Encounter for immunization- rx given  -     varicella-zoster recombinant, PF, ([address] Hydro) 50 mcg/0.5 mL susr injection; 0.5 mL by IntraMUSCular route once for 1 dose. Follow-up Disposition:  Return in about 4 months (around 3/15/2019) for Hypertension. Medication risks/benefits/costs/interactions/alternatives discussed with patient. Adrienne Garza  was given an after visit summary which includes diagnoses, current medications, & vitals. she expressed understanding with the diagnosis and plan.

## 2018-11-15 NOTE — PROGRESS NOTES
Chief Complaint Patient presents with  Hypertension 1. Have you been to the ER, urgent care clinic since your last visit? Hospitalized since your last visit? Yes Where: [facility] Reason for visit: Rash 2. Have you seen or consulted any other health care providers outside of the [address] [place] [address] since your last visit? Include any pap smears or colon screening. No 
 
complains of left knee pain, stiff, weak. Hand tremor

## 2018-11-15 NOTE — PATIENT INSTRUCTIONS
It was a pleasure to see you! As discussed:    Lab Review  Your labs were clinically normal/ stable no medication changes are needed. Some labs that may have been tested and their explanation are:  Your electrolytes, kidney & liver function (Metabolic Panel)   Anemia, blood cells (CBC)  Thyroid (TSH + T4, T3)  Hormones (prolactin, vitamin D )   Diabetes (Hemoglobin A1c)   Lipid Panel (Cholesterol, HDL \"good\", LDL \"bad\")     High Blood Pressure (BP)  Well controlled today  -Continue to check your BP at home   -Follow a low sodium diet (<1500mg/ day)   -Exercise regularly goal, 150 minutes of cardiovascular exercise/ week  -If your blood pressure is too low (<90/60) or too high (>180/100) or you have any symptoms such as chest pain, dizziness, shortness of breath- seek immediate medical attention. See  Www.health.gov for more information. Grief  I recommend start with your [facility] counseling service or use the list provided. Grief (Actual/Anticipated): Care Instructions  Your Care Instructions    Grief is your emotional reaction to a major loss. The words \"sorrow\" and \"heartache\" often are used to describe feelings of grief. You feel grief when you lose a beloved person, pet, place, or thing. It is also natural to feel grief when you lose a valued way of life, such as a job, marriage, or good health. You may begin to grieve before a loss occurs. You may grieve for a loved one who is sick and dying. Children and adults often feel the pain of loss before a big move or divorce. This type of grief helps you get ready for a loss. Grief is different for each person. There is no \"normal\" or \"expected\" period of time for grieving. Some people adjust to their loss within a couple of months. Others may take 2 years or longer, especially if their lives were changed a lot or if the loss was sudden and shocking.   Grieving can cause problems such as headaches, loss of appetite, and trouble with thinking or sleeping. You may withdraw from friends and family and behave in ways that are unusual for you. Grief may cause you to question your beliefs and views about life. Grief is natural and does not require medical treatment. But if you have trouble sleeping, it may help to take sleeping pills for a short time. It may help to talk with people who have been through or are going through similar losses. You may also want to talk to a counselor about your feelings. Talking about your loss, sharing your cares and concerns, and getting support from others are important parts of healthy grieving. Follow-up care is a key part of your treatment and safety. Be sure to make and go to all appointments, and call your doctor if you are having problems. It's also a good idea to know your test results and keep a list of the medicines you take. How can you care for yourself at home? · Get enough sleep. Your mind helps make sense of your life while you sleep. Missing sleep can lead to illness and make it harder for you to deal with your grief. · Eat healthy foods. Try to avoid eating only foods that give you comfort. Ask someone to join you for a meal if you do not like eating alone. Consider taking a multivitamin every day. · Get some exercise every day. Even a walk can help you deal with your grief. Other exercises, such as yoga, can also help you manage stress. · Comfort yourself. Take time to look at photos or use special items that make you feel better. · Stay involved in your life. Do not withdraw from the activities you enjoy. People you know at work, Religious, clubs, or other groups can help you get through your period of grief. · Think about joining a support group to help you deal with your grief. There are many support groups to help people recover from grief. When should you call for help? Call 911 anytime you think you may need emergency care.  For example, call if:    · You feel you cannot stop from hurting yourself or someone else.    Watch closely for changes in your health, and be sure to contact your doctor if:    · You think you may be depressed.     · You do not get better as expected. Where can you learn more? Go to http://roman-kimberly.info/. Enter H249 in the search box to learn more about \"Grief (Actual/Anticipated): Care Instructions. \"  Current as of: April 19, 2018  Content Version: 11.8  © 0715-0043 Healthwise, Incorporated. Care instructions adapted under license by [employer] (which disclaims liability or warranty for this information). If you have questions about a medical condition or this instruction, always ask your healthcare professional. [address] any warranty or liability for your use of this information.

## 2019-03-07 ENCOUNTER — TELEPHONE (OUTPATIENT)
Dept: INTERNAL MEDICINE CLINIC | Age: 69
End: 2019-03-07

## 2019-03-07 NOTE — TELEPHONE ENCOUNTER
Pt is concerned about a sensation she keeps getting in her LT Breast - This has been going on since her last Mammogram in Oct.  It is a burning or pinch.   Advise what to do - [phone]

## 2019-03-07 NOTE — TELEPHONE ENCOUNTER
Patient states appointment coming next month but need to reschedule. Pt experiencing pain in left breast, not all the time. Feels like stinging. Scheduled with Dr Octaviano De Leon on 03/13/2019 @ 10am. Pt verbalized understanding.

## 2019-03-13 ENCOUNTER — OFFICE VISIT (OUTPATIENT)
Dept: INTERNAL MEDICINE CLINIC | Age: 69
End: 2019-03-13

## 2019-03-13 VITALS
WEIGHT: 201.6 LBS | OXYGEN SATURATION: 96 % | BODY MASS INDEX: 34.42 KG/M2 | RESPIRATION RATE: 16 BRPM | HEIGHT: 64 IN | SYSTOLIC BLOOD PRESSURE: 128 MMHG | DIASTOLIC BLOOD PRESSURE: 80 MMHG | TEMPERATURE: 98.2 F | HEART RATE: 64 BPM

## 2019-03-13 DIAGNOSIS — R07.9 CHEST PAIN, UNSPECIFIED TYPE: Primary | ICD-10-CM

## 2019-03-13 DIAGNOSIS — I10 ESSENTIAL HYPERTENSION: ICD-10-CM

## 2019-03-13 DIAGNOSIS — M25.512 LEFT SHOULDER PAIN, UNSPECIFIED CHRONICITY: ICD-10-CM

## 2019-03-13 NOTE — PROGRESS NOTES
HISTORY OF PRESENT ILLNESS  Raegan Cesar is a 70 y.o. female. HPI  Patient is a 69 yo woman with a history of HTN, and mild mitral regurgitation who complains of  intermittent left chest/breast aching, and occasional burning pain left chest. These symptoms last occurred about 1 week ago. Patient has also had left shoulder aching with pain radiating down her left arm. Arm and chest symptoms are not exertional. Arm aches when she raises up her shoulder, when she lies flat. At times she also has radiation across her left back. She denies numbness, weakness, shortness of breath or palpitations,  nausea or diaphoresis. She has not taken any medications. In 2018 patient was evaluated for palpitations by Dr. Honorio Pearson with a Loop recorder and Echo. Denies palpitations or shortness of breath. She also complains of left knee pain, and has seen Orthopedics, at [facility]. Told she might need a TKR. She plans to schedule a follow up. Last mammo 9/18    Past Medical History:   Diagnosis Date    Hypertension     Thyroid nodule     annual eval with US      Current Outpatient Medications on File Prior to Visit   Medication Sig Dispense Refill    triamterene-hydroCHLOROthiazide (MAXZIDE) 37.5-25 mg per tablet TAKE 1 TABLET BY MOUTH TWICE A  Tab 2    aspirin 81 mg chewable tablet Take 81 mg by mouth daily. Patient states she \"usually takes\" a baby aspirin daily      omega-3 fatty acids-vitamin e 1,000 mg cap Take 1 Cap by mouth.  OTHER \"tumeric\"      CALCIUM CARBONATE/VITAMIN D3 (CALCIUM + D PO) Take  by mouth daily.  multivitamin (ONE A DAY) tablet Take 1 Tab by mouth daily.  diclofenac (VOLTAREN) 1 % gel Apply  to affected area three (3) times daily as needed for Pain. 100 g 0     No current facility-administered medications on file prior to visit.         ROS  Per [facility]  Physical Exam  Visit Vitals  /80 (BP 1 Location: Right arm, BP Patient Position: Sitting)   Pulse 64   Temp 98.2 °F (36.8 °C) (Oral)   Resp 16   Ht 5' 4\" (1.626 m)   Wt 201 lb 9.6 oz (91.4 kg)   SpO2 96%   BMI 34.60 kg/m²      Patient appears well  Heart[de-identified] normal rate, regular rhythm, normal S1, S2, I-II/VI SM  Breasts: without mass or tenderness  Chest: clear to auscultation, no wheezes, rales or rhonchi,   Extremities: no edema  Left shoulder, non tender, full range of motion     EKG: NSR normal EKG  ASSESSMENT and PLAN  Intermittent left chest pain   Left shoulder pain  Stress Echo to rule out atypical ischemic disease  Tylenol 2 tabs every 6-8 hours as needed for shoulder pain  follow up with Ortho as planned  Hypertension: controlled

## 2019-03-13 NOTE — PATIENT INSTRUCTIONS
Stress Echo ordered   Tylenol 2 tabs every 6-8 hours as needed for shoulder pain  Call or return to clinic if these symptoms worsen or fail to improve as anticipated.

## 2019-03-21 ENCOUNTER — TELEPHONE (OUTPATIENT)
Dept: INTERNAL MEDICINE CLINIC | Age: 69
End: 2019-03-21

## 2019-03-21 NOTE — TELEPHONE ENCOUNTER
Left message for patient at [phone] to advise her Echo Stress has not been approved by her insurance. Also left a message on her cell phone [phone]. I called [facility] to find out if the location for the test was out of network. They advised they were waiting for notes from PCP. Ki Garland called to advise it was the location. I called Ki Garland back @ [phone] to let him know it was the notes and any other information pertaining to the Echo Stress that was holding up the approval. He verbalized understanding. This authorization was done by mulu.

## 2019-04-03 ENCOUNTER — OFFICE VISIT (OUTPATIENT)
Dept: INTERNAL MEDICINE CLINIC | Age: 69
End: 2019-04-03

## 2019-04-03 VITALS
TEMPERATURE: 97.9 F | HEART RATE: 75 BPM | DIASTOLIC BLOOD PRESSURE: 86 MMHG | RESPIRATION RATE: 16 BRPM | WEIGHT: 202.8 LBS | HEIGHT: 64 IN | SYSTOLIC BLOOD PRESSURE: 148 MMHG | BODY MASS INDEX: 34.62 KG/M2 | OXYGEN SATURATION: 98 %

## 2019-04-03 DIAGNOSIS — M25.512 ACUTE PAIN OF LEFT SHOULDER: ICD-10-CM

## 2019-04-03 DIAGNOSIS — I10 ESSENTIAL HYPERTENSION: Primary | ICD-10-CM

## 2019-04-03 DIAGNOSIS — R07.89 ATYPICAL CHEST PAIN: ICD-10-CM

## 2019-04-03 NOTE — PROGRESS NOTES
Chief Complaint Patient presents with  Hypertension 1. Have you been to the ER, urgent care clinic since your last visit? Hospitalized since your last visit? Yes Where: [facility] Reason for visit: Sinus infection 2. Have you seen or consulted any other health care providers outside of the [address] [place] [address] since your last visit? Include any pap smears or colon screening. Yes Where: Ophthalmologist Reason for visit: follow up

## 2019-04-03 NOTE — PROGRESS NOTES
HISTORY OF PRESENT ILLNESS 
Luma Rutledge is a 70 y.o. female. HPI Cardiovascular Review: 
The patient has hypertension. Presented on 3/13/19 with atypical chest pain (left sided breast and arm pain)- EKG was wnl. Stress Echo not done due to insurance. She has not seen Dr. George Borja yet. Diet and Lifestyle: nonsmoker Home BP Monitoring: is not measured at home. Pertinent ROS: taking medications as instructed, no medication side effects noted, no TIA's, no chest pain on exertion, no dyspnea on exertion, no swelling of ankles. Review of Systems Musculoskeletal: Positive for joint pain (left shoulder pain intermittently). per HPI Patient Active Problem List  
 Diagnosis Date Noted  Palpitations 06/04/2018  Left atrial enlargement 02/27/2018  Encounter for colonoscopy due to history of adenomatous colonic polyps 03/30/2016  Change in bowel habits 03/30/2016  Obesity 06/15/2015  Hypertension 12/02/2014  Multinodular non-toxic goiter 12/02/2014 Current Outpatient Medications Medication Sig Dispense Refill  triamterene-hydroCHLOROthiazide (MAXZIDE) 37.5-25 mg per tablet TAKE 1 TABLET BY MOUTH TWICE A  Tab 2  
 aspirin 81 mg chewable tablet Take 81 mg by mouth daily. Patient states she \"usually takes\" a baby aspirin daily  omega-3 fatty acids-vitamin e 1,000 mg cap Take 1 Cap by mouth.  OTHER \"tumeric\"  CALCIUM CARBONATE/VITAMIN D3 (CALCIUM + D PO) Take  by mouth daily.  multivitamin (ONE A DAY) tablet Take 1 Tab by mouth daily.  diclofenac (VOLTAREN) 1 % gel Apply  to affected area three (3) times daily as needed for Pain. 100 g 0 No Known Allergies Visit Vitals /86 (BP 1 Location: Left arm, BP Patient Position: Sitting) Pulse 75 Temp 97.9 °F (36.6 °C) (Oral) Resp 16 Ht 5' 4\" (1.626 m) Wt 202 lb 12.8 oz (92 kg) SpO2 98% BMI 34.81 kg/m² Physical Exam  
 Constitutional: She is oriented to person, place, and time. No distress. Cardiovascular: Normal rate and regular rhythm. Pulmonary/Chest: Breath sounds normal. No respiratory distress. She has no wheezes. She has no rales. Musculoskeletal: Edema: BLE ankles. Neurological: She is alert and oriented to person, place, and time. Psychiatric: She has a normal mood and affect. ASSESSMENT and PLAN Diagnoses and all orders for this visit: 1. Essential hypertension- BP slightly elevated today but reports lower BP at home. No med changes. Counseled on low sodium diet and exercise. Monitor BP at home and notify office if BP remains elevated. Parameters given. See AVS for full details of plan and patient discussion. 2. Atypical chest pain- resolved. Advised to see her cardiologist who can assist with scheduling stress echo if still indicated. 3. Acute pain of left shoulder- see orthopedics. Medication risks/benefits/costs/interactions/alternatives discussed with patient. Jonathan Centeno  was given an after visit summary which includes diagnoses, current medications, & vitals. she expressed understanding with the diagnosis and plan. 20 minutes of 25 minutes of care coordination was spent counseling patient on treatment plan and assessing understanding

## 2019-04-03 NOTE — PATIENT INSTRUCTIONS
It was a pleasure to see you! As discussed: It has been a pleasure caring for you! For your next appointment:  
Schedule with Dr. Abdirahman Valentine or Cedric Benitez at [facility] [phone] Schedule with Dr. Gregory Agent Home Blood Pressure Test: About This Test 
What is it? A home blood pressure test allows you to keep track of your blood pressure at home. Blood pressure is a measure of the force of blood against the walls of your arteries. Blood pressure readings include two numbers, such as 130/80 (say \"130 over 80\"). The first number is the systolic pressure. The second number is the diastolic pressure. Why is this test done? You may do this test at home to: · Find out if you have high blood pressure. · Track your blood pressure if you have high blood pressure. · Track how well medicine is working to reduce high blood pressure. · Check how lifestyle changes, such as weight loss and exercise, are affecting blood pressure. How can you prepare for the test? 
· Do not use caffeine, tobacco, or medicines known to raise blood pressure (such as nasal decongestant sprays) for at least 30 minutes before taking your blood pressure. · Do not exercise for at least 30 minutes before taking your blood pressure. What happens before the test? 
Take your blood pressure while you feel comfortable and relaxed. Sit quietly with both feet on the floor for at least 5 minutes before the test. 
What happens during the test? 
· Sit with your arm slightly bent and resting on a table so that your upper arm is at the same level as your heart. · Roll up your sleeve or take off your shirt to expose your upper arm. · Wrap the blood pressure cuff around your upper arm so that the lower edge of the cuff is about 1 inch above the bend of your elbow. Proceed with the following steps depending on if you are using an automatic or manual pressure monitor. Automatic blood pressure monitors · Press the on/off button on the automatic monitor and wait until the ready-to-measure \"heart\" symbol appears next to zero in the display window. · Press the start button. The cuff will inflate and deflate by itself. · Your blood pressure numbers will appear on the screen. · Write your numbers in your log book, along with the date and time. Manual blood pressure monitors · Place the earpieces of a stethoscope in your ears, and place the bell of the stethoscope over the artery, just below the cuff. · Close the valve on the rubber inflating bulb. · Squeeze the bulb rapidly with your opposite hand to inflate the cuff until the dial or column of mercury reads about 30 mm Hg higher than your usual systolic pressure. If you do not know your usual pressure, inflate the cuff to 210 mm Hg or until the pulse at your wrist disappears. · Open the pressure valve just slightly by twisting or pressing the valve on the bulb. · As you watch the pressure slowly fall, note the level on the dial at which you first start to hear a pulsing or tapping sound through the stethoscope. This is your systolic blood pressure. · Continue letting the air out slowly. The sounds will become muffled and will finally disappear. Note the pressure when the sounds completely disappear. This is your diastolic blood pressure. Let out all the remaining air. · Write your numbers in your log book, along with the date and time. What else should you know about the test? 
It is more accurate to take the average of several readings made throughout the day than to rely on a single reading. It's normal for blood pressure to go up and down throughout the day. Follow-up care is a key part of your treatment and safety. Be sure to make and go to all appointments, and call your doctor if you are having problems. It's also a good idea to keep a list of the medicines you take. Where can you learn more? Go to http://roman-kimberly.info/. Enter C427 in the search box to learn more about \"Home Blood Pressure Test: About This Test.\" Current as of: July 22, 2018 Content Version: 11.9 © 7956-9436 [employer]. Care instructions adapted under license by [employer] (which disclaims liability or warranty for this information). If you have questions about a medical condition or this instruction, always ask your healthcare professional. [place] [address] any warranty or liability for your use of this information.

## 2019-04-08 ENCOUNTER — OFFICE VISIT (OUTPATIENT)
Dept: CARDIOLOGY CLINIC | Age: 69
End: 2019-04-08

## 2019-04-08 VITALS
DIASTOLIC BLOOD PRESSURE: 80 MMHG | HEIGHT: 64 IN | HEART RATE: 71 BPM | SYSTOLIC BLOOD PRESSURE: 130 MMHG | RESPIRATION RATE: 18 BRPM | WEIGHT: 201.6 LBS | BODY MASS INDEX: 34.42 KG/M2 | OXYGEN SATURATION: 96 %

## 2019-04-08 DIAGNOSIS — I10 ESSENTIAL HYPERTENSION: Primary | ICD-10-CM

## 2019-04-08 DIAGNOSIS — R00.2 PALPITATIONS: ICD-10-CM

## 2019-04-08 DIAGNOSIS — Z82.49 FAMILY HISTORY OF PREMATURE CAD: ICD-10-CM

## 2019-04-08 DIAGNOSIS — R07.89 OTHER CHEST PAIN: ICD-10-CM

## 2019-04-08 NOTE — PROGRESS NOTES
HISTORY OF PRESENT ILLNESS  Johnny Peres is a 70 y.o. female     SUMMARY:   Problem List  Date Reviewed: 4/5/2019          Codes Class Noted    Palpitations ICD-10-CM: R00.2  ICD-9-CM: 785.1  6/4/2018    Overview Signed 6/4/2018  9:02 AM by Laila Mirza MD     3/18 echo normal lvef, mild mr and tr without pul htn  3/18 event monitor, nsr             Left atrial enlargement ICD-10-CM: I51.7  ICD-9-CM: 429.3  2/27/2018        Encounter for colonoscopy due to history of adenomatous colonic polyps ICD-10-CM: Z12.11, Z86.010  ICD-9-CM: V76.51, V12.72  3/30/2016        Change in bowel habits ICD-10-CM: R19.4  ICD-9-CM: 787.99  3/30/2016        Obesity ICD-10-CM: E66.9  ICD-9-CM: 278.00  6/15/2015        Hypertension ICD-10-CM: I10  ICD-9-CM: 401.9  12/2/2014        Multinodular non-toxic goiter ICD-10-CM: E04.2  ICD-9-CM: 241.1  12/2/2014              Current Outpatient Medications on File Prior to Visit   Medication Sig    triamterene-hydroCHLOROthiazide (MAXZIDE) 37.5-25 mg per tablet TAKE 1 TABLET BY MOUTH TWICE A DAY    aspirin 81 mg chewable tablet Take 81 mg by mouth daily. Patient states she \"usually takes\" a baby aspirin daily    omega-3 fatty acids-vitamin e 1,000 mg cap Take 1 Cap by mouth.  OTHER \"tumeric\"    CALCIUM CARBONATE/VITAMIN D3 (CALCIUM + D PO) Take  by mouth daily.  multivitamin (ONE A DAY) tablet Take 1 Tab by mouth daily. No current facility-administered medications on file prior to visit. CARDIOLOGY STUDIES TO DATE:  3/18 echo normal lvef, mild mr and tr without pul htn  3/18 event monitor, nsr    Chief Complaint   Patient presents with    Shoulder Pain     left     HPI :  Ms. Mac Coronel is doing fine, though about a month or more ago, she developed some discomfort in her left shoulder and left upper chest.  It is worse with certain position, the way she lays and sleeps at night and it is not associated with any other symptoms.   Somewhat worse with certain types of movements as well. She is active, but not exercising as much as she would like, though she plans on starting walking with one of her friends now that the weather has gotten better. Her lipid profile in November looked outstanding. EKG done last month in Dr. Waldemar Cardozo office was normal.  She is leaving in a month or so to spend a month with her daughter and grandson in [place] [address]. CARDIAC ROS:   negative for dyspnea, palpitations, syncope, orthopnea, paroxysmal nocturnal dyspnea, exertional chest pressure/discomfort, claudication, lower extremity edema    Family History   Problem Relation Age of Onset    Cancer Mother         Colon CA    Hypertension Mother     Heart Disease Father         heart attack age 52, smoker    Hypertension Father     Cancer Sister         Colon CA    Hypertension Sister     Cancer Brother         Esophageal    Hypertension Brother     Cancer Brother         Multiple myeloma    Hypertension Brother     Heart Disease Brother     Hypertension Brother     Hypertension Brother        Past Medical History:   Diagnosis Date    Hypertension     Thyroid nodule     annual eval with US       GENERAL ROS:  A comprehensive review of systems was negative except for that written in the HPI.     Visit Vitals  /80 (BP 1 Location: Left arm, BP Patient Position: Sitting)   Pulse 71   Resp 18   Ht 5' 4\" (1.626 m)   Wt 201 lb 9.6 oz (91.4 kg)   SpO2 96%   BMI 34.60 kg/m²       Wt Readings from Last 3 Encounters:   04/08/19 201 lb 9.6 oz (91.4 kg)   04/03/19 202 lb 12.8 oz (92 kg)   03/13/19 201 lb 9.6 oz (91.4 kg)            BP Readings from Last 3 Encounters:   04/08/19 130/80   04/03/19 148/86   03/13/19 128/80       PHYSICAL EXAM  General appearance: alert, cooperative, no distress, appears stated age  Neurologic: Alert and oriented X 3  Neck: supple, symmetrical, trachea midline, no adenopathy, no carotid bruit and no JVD  Lungs: clear to auscultation bilaterally  Heart: regular rate and rhythm, S1, S2 normal, no murmur, click, rub or gallop  Extremities: extremities normal, atraumatic, no cyanosis or edema    Lab Results   Component Value Date/Time    Cholesterol, total 184 11/05/2018 09:30 AM    Cholesterol, total 155 02/27/2018 10:30 AM    Cholesterol, total 168 09/14/2017 10:27 AM    Cholesterol, total 181 03/13/2017 09:19 AM    Cholesterol, total 182 04/06/2016 07:57 AM    HDL Cholesterol 78 11/05/2018 09:30 AM    HDL Cholesterol 78 02/27/2018 10:30 AM    HDL Cholesterol 74 09/14/2017 10:27 AM    HDL Cholesterol 76 03/13/2017 09:19 AM    HDL Cholesterol 69 04/06/2016 07:57 AM    LDL, calculated 93 11/05/2018 09:30 AM    LDL, calculated 62 02/27/2018 10:30 AM    LDL, calculated 80 09/14/2017 10:27 AM    LDL, calculated 87 03/13/2017 09:19 AM    LDL, calculated 96 04/06/2016 07:57 AM    Triglyceride 67 11/05/2018 09:30 AM    Triglyceride 75 02/27/2018 10:30 AM    Triglyceride 72 09/14/2017 10:27 AM    Triglyceride 92 03/13/2017 09:19 AM    Triglyceride 86 04/06/2016 07:57 AM    CHOL/HDL Ratio 2.6 01/11/2010 10:53 AM    CHOL/HDL Ratio 2.3 02/02/2009 09:50 AM     ASSESSMENT  Ms. Nayeli Wright symptoms are not consistent with a cardiac etiology for a number of reasons and we talked about that at length including symptoms that would prompt an urgent return visit here or a call to 911. At this point, I do not think she needs any cardiac testing. current treatment plan is effective, no change in therapy  lab results and schedule of future lab studies reviewed with patient  reviewed diet, exercise and weight control    Encounter Diagnoses   Name Primary?  Essential hypertension Yes    Palpitations     Family history of premature CAD     Other chest pain      No orders of the defined types were placed in this encounter. Follow-up and Dispositions    · Return in about 1 year (around 4/8/2020).          Tye Senior MD  4/8/2019

## 2019-04-08 NOTE — PROGRESS NOTES
Saintclair Bright is a 70 y.o. female    Chief Complaint   Patient presents with    Shoulder Pain     left     1. Have you been to the ER, urgent care clinic since your last visit? Hospitalized since your last visit? No    2. Have you seen or consulted any other health care providers outside of the [address] [place] [address] since your last visit? Include any pap smears or colon screening.  No    Visit Vitals  /80 (BP 1 Location: Left arm, BP Patient Position: Sitting)   Pulse 71   Resp 18   Ht 5' 4\" (1.626 m)   Wt 201 lb 9.6 oz (91.4 kg)   SpO2 96%   BMI 34.60 kg/m²

## 2019-04-09 LAB
ALBUMIN SERPL-MCNC: 4.2 G/DL (ref 3.6–4.8)
ALBUMIN/GLOB SERPL: 1.5 {RATIO} (ref 1.2–2.2)
ALP SERPL-CCNC: 81 IU/L (ref 39–117)
ALT SERPL-CCNC: 16 IU/L (ref 0–32)
AST SERPL-CCNC: 21 IU/L (ref 0–40)
BILIRUB SERPL-MCNC: 0.3 MG/DL (ref 0–1.2)
BUN SERPL-MCNC: 16 MG/DL (ref 8–27)
BUN/CREAT SERPL: 16 (ref 12–28)
CALCIUM SERPL-MCNC: 10 MG/DL (ref 8.7–10.3)
CHLORIDE SERPL-SCNC: 102 MMOL/L (ref 96–106)
CO2 SERPL-SCNC: 24 MMOL/L (ref 20–29)
CREAT SERPL-MCNC: 1 MG/DL (ref 0.57–1)
GLOBULIN SER CALC-MCNC: 2.8 G/DL (ref 1.5–4.5)
GLUCOSE SERPL-MCNC: 95 MG/DL (ref 65–99)
MAGNESIUM SERPL-MCNC: 2 MG/DL (ref 1.6–2.3)
POTASSIUM SERPL-SCNC: 4.6 MMOL/L (ref 3.5–5.2)
PROT SERPL-MCNC: 7 G/DL (ref 6–8.5)
SODIUM SERPL-SCNC: 143 MMOL/L (ref 134–144)

## 2019-05-23 ENCOUNTER — OFFICE VISIT (OUTPATIENT)
Dept: INTERNAL MEDICINE CLINIC | Age: 69
End: 2019-05-23

## 2019-05-23 VITALS
SYSTOLIC BLOOD PRESSURE: 140 MMHG | DIASTOLIC BLOOD PRESSURE: 84 MMHG | RESPIRATION RATE: 16 BRPM | TEMPERATURE: 98.4 F | HEART RATE: 84 BPM | BODY MASS INDEX: 34.62 KG/M2 | OXYGEN SATURATION: 95 % | HEIGHT: 64 IN | WEIGHT: 202.8 LBS

## 2019-05-23 DIAGNOSIS — M15.8 OTHER OSTEOARTHRITIS INVOLVING MULTIPLE JOINTS: ICD-10-CM

## 2019-05-23 DIAGNOSIS — M62.838 TRAPEZIUS MUSCLE SPASM: ICD-10-CM

## 2019-05-23 DIAGNOSIS — J98.59 MEDIASTINAL MASS: Primary | ICD-10-CM

## 2019-05-23 NOTE — PROGRESS NOTES
HISTORY OF PRESENT ILLNESS  Luma Rutledge is a 70 y.o. female. Shoulder Pain    The incident occurred more than 1 week ago. Injury mechanism: started with driving tractor  The right shoulder is affected. The pain is at a severity of 5/10. The pain is moderate. The pain radiates (radiating up her neck). There is no history of shoulder injury. She has no other injuries. There is no history of shoulder surgery. Pertinent negatives include no numbness, no muscle weakness and no tingling. Clavicle Mass  Right sided growth on right clavicle, nNoticed 2 months ago and gradual increasing in size. She has noticed anterior and posterior shoulder pain. No night sweats or weight loss. +thyroid nodules. Denies other LAD     Review of Systems   Constitutional: Negative for chills and fever. Musculoskeletal: Positive for joint pain (while in [place]. It has resolved. ). Neurological: Negative for tingling and numbness. Patient Active Problem List    Diagnosis Date Noted    Palpitations 06/04/2018    Left atrial enlargement 02/27/2018    Encounter for colonoscopy due to history of adenomatous colonic polyps 03/30/2016    Change in bowel habits 03/30/2016    Obesity 06/15/2015    Hypertension 12/02/2014    Multinodular non-toxic goiter 12/02/2014       Current Outpatient Medications   Medication Sig Dispense Refill    triamterene-hydroCHLOROthiazide (MAXZIDE) 37.5-25 mg per tablet TAKE 1 TABLET BY MOUTH TWICE A  Tab 2    aspirin 81 mg chewable tablet Take 81 mg by mouth daily. Patient states she \"usually takes\" a baby aspirin daily      omega-3 fatty acids-vitamin e 1,000 mg cap Take 1 Cap by mouth.  OTHER \"tumeric\"      CALCIUM CARBONATE/VITAMIN D3 (CALCIUM + D PO) Take  by mouth daily.  multivitamin (ONE A DAY) tablet Take 1 Tab by mouth daily.          No Known Allergies   Visit Vitals  /84 (BP 1 Location: Right arm, BP Patient Position: Sitting)   Pulse 84   Temp 98.4 °F (36.9 °C) (Oral)   Resp 16   Ht 5' 4\" (1.626 m)   Wt 202 lb 12.8 oz (92 kg)   SpO2 95%   BMI 34.81 kg/m²       Physical Exam   Constitutional: She is oriented to person, place, and time. No distress. Cardiovascular: Normal rate and regular rhythm. Pulmonary/Chest: Breath sounds normal. No respiratory distress. She has no wheezes. She has no rales. Musculoskeletal:        Cervical back: She exhibits tenderness. Back:    Neurological: She is alert and oriented to person, place, and time. ICD Codes / Adm. Diagnosis: 723.1  416.8 / Cervicalgia    Examination: Hector EL CON  - [id number] - Oct 11 2012 11:52AM  Accession No:  [id number]  Reason:  PAIN RADIATING DOWN RIGHT ARM      REPORT:  INDICATION: Right upper extremity pain and numbness    COMPARISON: None    TECHNIQUE: Axial T1 and T2 and sagittal T1, T2, and STIR noncontrast  MRI of   the cervical spine performed on the 3 Nubia magnet. FINDINGS: Reversal of the normal cervical lordosis secondary to patient   positioning or muscle spasm. There is rightward curvature of the thoracic   spine. No subluxation. Normal bone marrow signal. No compression deformity. Multilevel disc desiccation. Cervicomedullary junction is within normal limits. No cord compression. Cord   signal is within normal limits. Sella turcica is partially empty. Ill-defined nodule in the right thyroid   lobe is partially imaged. The C1-C2 relationship is within normal limits. C2-C3: No herniation or stenosis. C3-C4: Posterior disc osteophyte complex. No stenosis. C4-C5: Central disc protrusion. Mild central spinal canal stenosis. C5-C6: Posterior disc osteophyte complex. Mild central spinal canal   stenosis. Moderate right and mild left foraminal stenosis. C6-C7: Posterior disc osteophyte complex. Mild central spinal canal   stenosis. At least moderate right foraminal stenosis. C7-T1: No herniation or stenosis.        IMPRESSION:   1.  C6-C7 at least moderate right foraminal stenosis. 2. C5-C6 and C6-C7 mild central spinal canal stenosis. 3. C5-C6 moderate right foraminal stenosis. 4. Partially imaged right thyroid nodule. Consider thyroid ultrasound.     ASSESSMENT and PLAN  Diagnoses and all orders for this visit:    1. Mediastinal mass- etiology not clear. Likely fat pad but given associated pain need to r/o malignancy. CT chest was denied by insurance will order xr and ultrasound then proceed with CT chest with contrast if abnormal.   -     CT CHEST W CONT; Future    2. Trapezius muscle spasm- RICE. Scheduled with orthopedics. Red flags to warrant ER or earlier clinical evaluation reviewed. 3. Other osteoarthritis involving multiple joints- expectant management reviewed. Medication risks/benefits/costs/interactions/alternatives discussed with patient. Eulalio Armijo  was given an after visit summary which includes diagnoses, current medications, & vitals. she expressed understanding with the diagnosis and plan.     20 minutes of 25 minutes of care coordination was spent counseling patient on treatment plan and assessing understanding

## 2019-05-23 NOTE — PATIENT INSTRUCTIONS
It was a pleasure to see you! As discussed: It has been a pleasure caring for you! For your next appointment:   Schedule with  Kassandra Connelly MD  Address: [place], [address]   Phone: [phone]    I have ordered a CT to look at the growth on your clavicle but this was denied so I ordered an ultrasound and xray. See the orthopedist regarding your shoulder pain         Neck Spasm: Care Instructions  Your Care Instructions  A neck spasm is sudden tightness and pain in your neck muscles. A spasm may be caused by some activities or repeated movements. For example, you may be more likely to have a neck spasm if you slouch, paint a ceiling, work at a computer, or sleep with your neck twisted. But the cause isn't always clear. Home treatment includes using heat or ice, taking over-the-counter (OTC) pain medicines, and avoiding activities that may lead to neck pain. Gentle stretching, or treatments such as massage or manipulation, may also help ease a neck spasm. For a neck spasm that doesn't get better with home care, your doctor may prescribe medicine. He or she may also suggest exercise or physical therapy to help strengthen or relax your neck muscles. Follow-up care is a key part of your treatment and safety. Be sure to make and go to all appointments, and call your doctor if you are having problems. It's also a good idea to know your test results and keep a list of the medicines you take. How can you care for yourself at home? · To relieve pain, use heat or ice (whichever feels better) on the affected area. ? Put a warm water bottle, a heating pad set on low, or a warm cloth on your neck. Put a thin cloth between the heating pad and your skin. Do not go to sleep with a heating pad on your skin. ? Try ice or a cold pack on the area for 10 to 20 minutes at a time. Put a thin cloth between the ice and your skin.   · Ask your doctor if you can take acetaminophen (such as Tylenol) or nonsteroidal anti-inflammatory drugs, such as ibuprofen or naproxen. Your doctor can prescribe stronger medicines if needed. Be safe with medicines. Read and follow all instructions on the label. · Stretch your muscles every day, especially before and after exercise and at bedtime. Regular stretching can help relax your muscles. · Try to find a pillow and a position in bed that help improve your night's rest.  · Try to stay active. It's best to start activity slowly. If an exercise makes your pain worse, stop doing it. When should you call for help? Call 911 anytime you think you may need emergency care. For example, call if:    · You are unable to move an arm or a leg at all.   [facility] your doctor now or seek immediate medical care if:    · You have new or worse symptoms in your arms, legs, belly, or buttocks. Symptoms may include:  ? Numbness or tingling. ? Weakness. ? Pain.     · You lose bladder or bowel control.    Watch closely for changes in your health, and be sure to contact your doctor if:    · You do not get better as expected. Where can you learn more? Go to http://roman-kimberly.info/. Enter F608 in the search box to learn more about \"Neck Spasm: Care Instructions. \"  Current as of: September 20, 2018  Content Version: 11.9  © 6518-6683 [employer]. Care instructions adapted under license by [employer] (which disclaims liability or warranty for this information). If you have questions about a medical condition or this instruction, always ask your healthcare professional. [place] [address] any warranty or liability for your use of this information.

## 2019-05-24 ENCOUNTER — HOSPITAL ENCOUNTER (OUTPATIENT)
Dept: GENERAL RADIOLOGY | Age: 69
Discharge: HOME | End: 2019-05-24
Attending: INTERNAL MEDICINE
Payer: MEDICARE

## 2019-05-24 ENCOUNTER — HOSPITAL ENCOUNTER (OUTPATIENT)
Dept: ULTRASOUND IMAGING | Age: 69
Discharge: HOME | End: 2019-05-24
Attending: INTERNAL MEDICINE
Payer: MEDICARE

## 2019-05-24 DIAGNOSIS — J98.59 MEDIASTINAL MASS: ICD-10-CM

## 2019-05-24 PROCEDURE — 73000 X-RAY EXAM OF COLLAR BONE: CPT

## 2019-05-24 NOTE — PROGRESS NOTES
Hi Ms. Jagjit Solis news! The enlargement of your clavicle appears to be due to arthritis. No mass was identified on the x-ray or ultrasound. See the orthopedist as previously discussed. Do not hesitate to contact the office if you have any questions or concerns before your next appointment.    Kind regards,   Dr. Maria Antonia Amador

## 2019-05-24 NOTE — PROGRESS NOTES
Hi Ms. Sherren Ala news! Your x-ray does not show any critical findings on your clavicle. As discussed the protrusion is due to rotation from your neck And shoulders. The ultrasound results are still pending. Continue the precautions we discussed regarding her neck pain. Do not hesitate to contact the office if you have any questions or concerns before your next appointment with orthopedics.   Kind regards,   Dr. Elizabeth Faster

## 2019-06-24 ENCOUNTER — TELEPHONE (OUTPATIENT)
Dept: INTERNAL MEDICINE CLINIC | Age: 69
End: 2019-06-24

## 2019-06-24 NOTE — TELEPHONE ENCOUNTER
Pt of Dr Thomas Ortiz- would like to est with Dr Kailey Adames. Please advise if okay to set up new Pt appt for this Pt.     Pt Advise -[phone]

## 2019-06-24 NOTE — TELEPHONE ENCOUNTER
Dr. Luzmaria Lopez will be leaving [facility] and returning to the [address] [place] [address] in June 2019. We hope you will decide to stay with the [address] [place] [address] for your healthcare needs. [facility]. has recently opened with several providers as well as on-site lab and imaging services. Their office is located at [address] [facility], [address] [facility], [place], [address] [facility], phone [phone]. Additionally, [address] [place] [address] is located at [facility]. [place], [address] [facility], phone [phone]. [facility] hopes to add another provider to our practice in the near future at which time we would be happy to transition your care back to our office. Please also consult with your insurance as to which primary care providers are considered in network with your plan in the [facility] [place]. Dr. Luzmaria Lopez states it has been a pleasure caring for you and she wishes you all the best on your future health!

## 2019-07-15 ENCOUNTER — TELEPHONE (OUTPATIENT)
Dept: INTERNAL MEDICINE CLINIC | Age: 69
End: 2019-07-15

## 2019-07-15 NOTE — TELEPHONE ENCOUNTER
Spoke to pt. Reported Vannesa Monson has no NP appointments till February 2020. Scheduled with Dr. Simone Baltazar for [place] instead. No further questions at time of call.

## 2019-07-15 NOTE — TELEPHONE ENCOUNTER
----- Message from Patti Stanford sent at 7/15/2019  2:48 PM EDT -----  Regarding: Dr. Jetta Primrose Smith/Telephone   Pt is requesting to schedule  a new patient appt to discuss test results that should still be on file, attempted to schedule however no appt's were available.  Pt's Best Contact Number:  ([phone] (c) [phone]      Copy/paste envera

## 2019-08-05 ENCOUNTER — TELEPHONE (OUTPATIENT)
Dept: INTERNAL MEDICINE CLINIC | Age: 69
End: 2019-08-05

## 2019-08-05 NOTE — TELEPHONE ENCOUNTER
Spoke with patient. Two pt identifiers confirmed. Patient advised that we do not have any new patient availability prior to her appointment on 10/18/19 with Dr. Tati Pulliam. Advised patient that she may check back periodically for cancellations. Pt verbalized understanding of information discussed w/ no further questions at this time.

## 2019-08-05 NOTE — TELEPHONE ENCOUNTER
Pt is requesting a call back in regards to scheduling a sooner NP appt if possible.  Any day and time after 08/19 would be best . Best contact is [phone]       Message received & copied from [facility]

## 2019-09-26 ENCOUNTER — OFFICE VISIT (OUTPATIENT)
Dept: INTERNAL MEDICINE CLINIC | Age: 69
End: 2019-09-26

## 2019-09-26 VITALS
OXYGEN SATURATION: 98 % | RESPIRATION RATE: 16 BRPM | BODY MASS INDEX: 34.28 KG/M2 | HEIGHT: 64 IN | WEIGHT: 200.8 LBS | HEART RATE: 70 BPM | SYSTOLIC BLOOD PRESSURE: 133 MMHG | TEMPERATURE: 98.2 F | DIASTOLIC BLOOD PRESSURE: 70 MMHG

## 2019-09-26 DIAGNOSIS — E04.2 MULTINODULAR NON-TOXIC GOITER: ICD-10-CM

## 2019-09-26 DIAGNOSIS — Z80.0 FAMILY HISTORY OF COLON CANCER: ICD-10-CM

## 2019-09-26 DIAGNOSIS — Z13.39 SCREENING FOR ALCOHOLISM: ICD-10-CM

## 2019-09-26 DIAGNOSIS — E78.5 DYSLIPIDEMIA: ICD-10-CM

## 2019-09-26 DIAGNOSIS — M19.91 PRIMARY OSTEOARTHRITIS, UNSPECIFIED SITE: ICD-10-CM

## 2019-09-26 DIAGNOSIS — Z00.00 WELLNESS EXAMINATION: ICD-10-CM

## 2019-09-26 DIAGNOSIS — M79.605 PAIN IN BOTH LOWER EXTREMITIES: ICD-10-CM

## 2019-09-26 DIAGNOSIS — M79.604 PAIN IN BOTH LOWER EXTREMITIES: ICD-10-CM

## 2019-09-26 DIAGNOSIS — Z13.31 SCREENING FOR DEPRESSION: ICD-10-CM

## 2019-09-26 DIAGNOSIS — I10 ESSENTIAL HYPERTENSION: Primary | ICD-10-CM

## 2019-09-26 DIAGNOSIS — E66.9 CLASS 1 OBESITY WITHOUT SERIOUS COMORBIDITY WITH BODY MASS INDEX (BMI) OF 34.0 TO 34.9 IN ADULT, UNSPECIFIED OBESITY TYPE: ICD-10-CM

## 2019-09-26 NOTE — PROGRESS NOTES
Chief Complaint   Patient presents with    New Patient     [facility], history of shoulder/neck pain (has had PT), lower back with radiating pain down legs, legs get \"really tired\"   [employer] Visit     1. Have you been to the ER, urgent care clinic since your last visit? Hospitalized since your last visit? No    2. Have you seen or consulted any other health care providers outside of the [address] [place] [address] since your last visit? Include any pap smears or colon screening.  No

## 2019-09-26 NOTE — PROGRESS NOTES
[address] [facility]  [place] [address]  [address]  [facility] [address]  Phone:  [phone]  Fax: [phone]       Chief Complaint   Patient presents with    New Patient     [facility], history of shoulder/neck pain (has had PT), lower back with radiating pain down legs, legs get \"really tired\"   [employer] Visit   . SUBJECTIVE:    Serg Biswas is a 78 y.o. female Comes in as a new patient. She was formerly living in [address], where she and her  lived for almost 40 years. 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 Her   two years ago from pulmonary fibrosis. She most recently was seen because of a prominence of her right sternoclavicular joint. In reality, it was nothing more than arthritis. She has had intermittent pain in the right side of her neck, but this has improved significantly. She was seeing an endocrinologist for a nodular goiter and the workup was negative, including ultrasound, thyroid scan and ultimately a biopsy, which was negative. She is rather sedentary. She oftentimes gets stiff in the morning with pain in her thighs. Current Outpatient Medications   Medication Sig Dispense Refill    triamterene-hydroCHLOROthiazide (MAXZIDE) 37.5-25 mg per tablet TAKE 1 TABLET BY MOUTH TWICE A  Tab 2    omega-3 fatty acids-vitamin e 1,000 mg cap Take 1 Cap by mouth.  OTHER \"tumeric\"      CALCIUM CARBONATE/VITAMIN D3 (CALCIUM + D PO) Take  by mouth daily.  multivitamin (ONE A DAY) tablet Take 1 Tab by mouth daily.  aspirin 81 mg chewable tablet Take 81 mg by mouth daily.  Patient states she \"usually takes\" a baby aspirin daily       Past Medical History:   Diagnosis Date    Hypertension     Thyroid nodule     annual eval with US     Past Surgical History:   Procedure Laterality Date    HX COLONOSCOPY       No Known Allergies      REVIEW OF SYSTEMS:  General: negative for - chills or fever  ENT: negative for - headaches, nasal congestion or tinnitus  Respiratory: negative for - cough, hemoptysis, shortness of breath or wheezing  Cardiovascular : negative for - chest pain, edema, palpitations or shortness of breath  Gastrointestinal: negative for - abdominal pain, blood in stools, heartburn or nausea/vomiting  Genito-Urinary: no dysuria, trouble voiding, or hematuria  Musculoskeletal: negative for - gait disturbance, joint pain, joint stiffness or joint swelling  Neurological: no TIA or stroke symptoms  Hematologic: no bruises, no bleeding, no swollen glands  Integument: no lumps, mole changes, nail changes or rash  Endocrine: no malaise/lethargy or unexpected weight changes      Social History     Socioeconomic History    Marital status:      Spouse name: Not on file    Number of children: 1    Years of education: 16+    Highest education level: Bachelor's degree (e.g., BA, AB, BS)   Occupational History    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   last year from pulmonary fibrosis.     Daughter lives in [facility]----has 2 children--- daughter and  of both      Family History   Problem Relation Age of Onset    Cancer Mother         Colon CA    Hypertension Mother     Heart Disease Father         heart attack age 52, smoker    Hypertension Father     Cancer Sister         Colon CA    Hypertension Sister     Cancer Brother         Esophageal    Hypertension Brother     Cancer Brother         Multiple myeloma    Hypertension Brother     Heart Disease Brother     Hypertension Brother     Hypertension Brother        OBJECTIVE:    Visit Vitals  /70   Pulse 70   Temp 98.2 °F (36.8 °C) (Oral)   Resp 16   Ht 5' 4\" (1.626 m)   Wt 200 lb 12.8 oz (91.1 kg)   SpO2 98%   BMI 34.47 kg/m²     CONSTITUTIONAL: well , well nourished, appears age appropriate  EYES: perrla, eom intact  ENMT:moist mucous membranes, pharynx clear  NECK: supple. Thyroid normal  RESPIRATORY: Chest: clear to ascultation and percussion   CARDIOVASCULAR: Heart: regular rate and rhythm  GASTROINTESTINAL: Abdomen: soft, bowel sounds active  HEMATOLOGIC: no pathological lymph nodes palpated  MUSCULOSKELETAL: Extremities: no edema, pulse 1+   INTEGUMENT: No unusual rashes or suspicious skin lesions noted. Nails appear normal.  NEUROLOGIC: non-focal exam   MENTAL STATUS: alert and oriented, appropriate affect      ASSESSMENT:  1. Essential hypertension    2. Multinodular non-toxic goiter    3. Class 1 obesity without serious comorbidity with body mass index (BMI) of 34.0 to 34.9 in adult, unspecified obesity type    4. Primary osteoarthritis, unspecified site    5. Family history of colon cancer    6. Dyslipidemia    7. Pain in both lower extremities    8. Screening for alcoholism    9. Screening for depression    10. Wellness examination        PLAN:    1. Her BP is entirely normal at 120/78. She will reduce her diuretic to one tablet a day rather than twice a day. 2. Her multinodular goiter is stable. 3. I encouraged her to lose weight, which can be accomplished by eating meals, eliminating snacks and avoiding the consumption of processed carbs. 4. She has a prominence of her sternoclavicular joint related to osteoarthritis. 5. She is also quite anxious and is concerned about a lot of different symptoms that occasionally occur. 6. The discomfort she is having in her thighs in the early morning with walking is intermittent.   I do not find any obvious etiology at this point. I encouraged increasing her physical activity on a more consistent basis. 7. She is up to date on all of her preventive items. She does have a family history of colon cancer in that her mother  at age 76 from this. Follow-up and Dispositions    · Return in about 4 months (around 2020). Doloris Phalen, MD  This is the Subsequent Medicare Annual Wellness Exam, performed 12 months or more after the Initial AWV or the last Subsequent AWV    I have reviewed the patient's medical history in detail and updated the computerized patient record. History     Past Medical History:   Diagnosis Date    Hypertension     Thyroid nodule     annual eval with US      Past Surgical History:   Procedure Laterality Date    HX COLONOSCOPY       Current Outpatient Medications   Medication Sig Dispense Refill    triamterene-hydroCHLOROthiazide (MAXZIDE) 37.5-25 mg per tablet TAKE 1 TABLET BY MOUTH TWICE A  Tab 2    omega-3 fatty acids-vitamin e 1,000 mg cap Take 1 Cap by mouth.  OTHER \"tumeric\"      CALCIUM CARBONATE/VITAMIN D3 (CALCIUM + D PO) Take  by mouth daily.  multivitamin (ONE A DAY) tablet Take 1 Tab by mouth daily.  aspirin 81 mg chewable tablet Take 81 mg by mouth daily.  Patient states she \"usually takes\" a baby aspirin daily       No Known Allergies  Family History   Problem Relation Age of Onset    Cancer Mother         Colon CA    Hypertension Mother     Heart Disease Father         heart attack age 52, smoker    Hypertension Father     Cancer Sister         Colon CA    Hypertension Sister     Cancer Brother         Esophageal    Hypertension Brother     Cancer Brother         Multiple myeloma    Hypertension Brother     Heart Disease Brother     Hypertension Brother     Hypertension Brother      Social History     Tobacco Use    Smoking status: Never Smoker    Smokeless tobacco: Never Used   Substance Use Topics  Alcohol use: Yes     Alcohol/week: 1.7 standard drinks     Types: 2 Standard drinks or equivalent per week     Patient Active Problem List   Diagnosis Code    Hypertension I10    Multinodular non-toxic goiter E04.2    Obesity E66.9    Change in bowel habits R19.4    Left atrial enlargement I51.7    Palpitations R00.2    Pain in both lower extremities M79.604, M79.605    Family history of colon cancer Z80.0    Primary osteoarthritis M19.91       Depression Risk Factor Screening:     3 most recent PHQ Screens 9/26/2019   Little interest or pleasure in doing things Not at all   Feeling down, depressed, irritable, or hopeless Not at all   Total Score PHQ 2 0     Alcohol Risk Factor Screening: You do not drink alcohol or very rarely. Functional Ability and Level of Safety:   Hearing Loss  Hearing is good. Activities of Daily Living  The home contains: no safety equipment. Patient does total self care    Fall Risk  Fall Risk Assessment, last 12 mths 5/23/2019   Able to walk? Yes   Fall in past 12 months? No       Abuse Screen  Patient is not abused    Cognitive Screening   Evaluation of Cognitive Function:  Has your family/caregiver stated any concerns about your memory: no  Normal    Patient Care Team   Patient Care Team:  Riley Victoria MD as PCP - General (Internal Medicine)  Serita Aschoff, MD (Cardiology)    Assessment/Plan   Education and counseling provided:  Are appropriate based on today's review and evaluation    Diagnoses and all orders for this visit:    1. Essential hypertension  -     CBC WITH AUTOMATED DIFF  -     COLLECTION VENOUS BLOOD,VENIPUNCTURE  -     METABOLIC PANEL, COMPREHENSIVE  -     URINALYSIS W/ RFLX MICROSCOPIC    2. Multinodular non-toxic goiter    3. Class 1 obesity without serious comorbidity with body mass index (BMI) of 34.0 to 34.9 in adult, unspecified obesity type    4. Primary osteoarthritis, unspecified site    5. Family history of colon cancer    6. Dyslipidemia  -     APOLIPOPROTEIN B  -     LIPID PANEL  -     TSH 3RD GENERATION    7. Pain in both lower extremities    8. Screening for alcoholism  -     [place] ANNUAL ALCOHOL SCREEN 15 MIN    9. Screening for depression  -     Elina Mccain    10.  Wellness examination        Health Maintenance Due   Topic Date Due    Influenza Age 5 to Adult  08/01/2019    GLAUCOMA SCREENING Q2Y  08/22/2019

## 2019-09-27 LAB
ALBUMIN SERPL-MCNC: 4.4 G/DL (ref 3.5–4.8)
ALBUMIN/GLOB SERPL: 1.5 {RATIO} (ref 1.2–2.2)
ALP SERPL-CCNC: 86 IU/L (ref 39–117)
ALT SERPL-CCNC: 13 IU/L (ref 0–32)
APO B SERPL-MCNC: 83 MG/DL
APPEARANCE UR: CLEAR
AST SERPL-CCNC: 14 IU/L (ref 0–40)
BACTERIA #/AREA URNS HPF: ABNORMAL /[HPF]
BASOPHILS # BLD AUTO: 0 X10E3/UL (ref 0–0.2)
BASOPHILS NFR BLD AUTO: 1 %
BILIRUB SERPL-MCNC: 0.4 MG/DL (ref 0–1.2)
BILIRUB UR QL STRIP: NEGATIVE
BUN SERPL-MCNC: 18 MG/DL (ref 8–27)
BUN/CREAT SERPL: 16 (ref 12–28)
CALCIUM SERPL-MCNC: 10.2 MG/DL (ref 8.7–10.3)
CASTS URNS QL MICRO: ABNORMAL /LPF
CHLORIDE SERPL-SCNC: 100 MMOL/L (ref 96–106)
CHOLEST SERPL-MCNC: 186 MG/DL (ref 100–199)
CO2 SERPL-SCNC: 24 MMOL/L (ref 20–29)
COLOR UR: YELLOW
CREAT SERPL-MCNC: 1.12 MG/DL (ref 0.57–1)
CRYSTALS URNS MICRO: ABNORMAL
EOSINOPHIL # BLD AUTO: 0 X10E3/UL (ref 0–0.4)
EOSINOPHIL NFR BLD AUTO: 1 %
EPI CELLS #/AREA URNS HPF: ABNORMAL /HPF (ref 0–10)
ERYTHROCYTE [DISTWIDTH] IN BLOOD BY AUTOMATED COUNT: 12.1 % (ref 12.3–15.4)
GLOBULIN SER CALC-MCNC: 2.9 G/DL (ref 1.5–4.5)
GLUCOSE SERPL-MCNC: 95 MG/DL (ref 65–99)
GLUCOSE UR QL: NEGATIVE
HCT VFR BLD AUTO: 37.8 % (ref 34–46.6)
HDLC SERPL-MCNC: 87 MG/DL
HGB BLD-MCNC: 12.2 G/DL (ref 11.1–15.9)
HGB UR QL STRIP: NEGATIVE
IMM GRANULOCYTES # BLD AUTO: 0 X10E3/UL (ref 0–0.1)
IMM GRANULOCYTES NFR BLD AUTO: 0 %
KETONES UR QL STRIP: NEGATIVE
LDLC SERPL CALC-MCNC: 87 MG/DL (ref 0–99)
LEUKOCYTE ESTERASE UR QL STRIP: ABNORMAL
LYMPHOCYTES # BLD AUTO: 1.6 X10E3/UL (ref 0.7–3.1)
LYMPHOCYTES NFR BLD AUTO: 37 %
MCH RBC QN AUTO: 27.1 PG (ref 26.6–33)
MCHC RBC AUTO-ENTMCNC: 32.3 G/DL (ref 31.5–35.7)
MCV RBC AUTO: 84 FL (ref 79–97)
MICRO URNS: ABNORMAL
MONOCYTES # BLD AUTO: 0.4 X10E3/UL (ref 0.1–0.9)
MONOCYTES NFR BLD AUTO: 10 %
MUCOUS THREADS URNS QL MICRO: PRESENT
NEUTROPHILS # BLD AUTO: 2.2 X10E3/UL (ref 1.4–7)
NEUTROPHILS NFR BLD AUTO: 51 %
NITRITE UR QL STRIP: NEGATIVE
PH UR STRIP: 6 [PH] (ref 5–7.5)
PLATELET # BLD AUTO: 227 X10E3/UL (ref 150–450)
POTASSIUM SERPL-SCNC: 4.4 MMOL/L (ref 3.5–5.2)
PROT SERPL-MCNC: 7.3 G/DL (ref 6–8.5)
PROT UR QL STRIP: NEGATIVE
RBC # BLD AUTO: 4.51 X10E6/UL (ref 3.77–5.28)
RBC #/AREA URNS HPF: ABNORMAL /HPF (ref 0–2)
SODIUM SERPL-SCNC: 147 MMOL/L (ref 134–144)
SP GR UR: 1.01 (ref 1–1.03)
TRIGL SERPL-MCNC: 61 MG/DL (ref 0–149)
TSH SERPL DL<=0.005 MIU/L-ACNC: 0.81 UIU/ML (ref 0.45–4.5)
UNIDENT CRYS URNS QL MICRO: PRESENT
UROBILINOGEN UR STRIP-MCNC: 0.2 MG/DL (ref 0.2–1)
VLDLC SERPL CALC-MCNC: 12 MG/DL (ref 5–40)
WBC # BLD AUTO: 4.3 X10E3/UL (ref 3.4–10.8)
WBC #/AREA URNS HPF: ABNORMAL /HPF (ref 0–5)

## 2019-09-27 NOTE — PATIENT INSTRUCTIONS
Medicare Wellness Visit, Female The best way to live healthy is to have a lifestyle where you eat a well-balanced diet, exercise regularly, limit alcohol use, and quit all forms of tobacco/nicotine, if applicable. Regular preventive services are another way to keep healthy. Preventive services (vaccines, screening tests, monitoring & exams) can help personalize your care plan, which helps you manage your own care. Screening tests can find health problems at the earliest stages, when they are easiest to treat. Arsenio Horner follows the current, evidence-based guidelines published by the [facility] ([facility]) when recommending preventive services for our patients. Because we follow these guidelines, sometimes recommendations change over time as research supports it. (For example, mammograms used to be recommended annually. Even though Medicare will still pay for an annual mammogram, the newer guidelines recommend a mammogram every two years for women of average risk.) Of course, you and your doctor may decide to screen more often for some diseases, based on your risk and your health status. Preventive services for you include: - Medicare offers their members a free annual wellness visit, which is time for you and your primary care provider to discuss and plan for your preventive service needs. Take advantage of this benefit every year! 
-All adults over the age of 72 should receive the recommended pneumonia vaccines. Current USPSTF guidelines recommend a series of two vaccines for the best pneumonia protection.  
-All adults should have a flu vaccine yearly and a tetanus vaccine every 10 years. All adults age 61 and older should receive a shingles vaccine once in their lifetime.   
-A bone mass density test is recommended when a woman turns 65 to screen for osteoporosis. This test is only recommended one time, as a screening. Some providers will use this same test as a disease monitoring tool if you already have osteoporosis. -All adults age 38-68 who are overweight should have a diabetes screening test once every three years.  
-Other screening tests and preventive services for persons with diabetes include: an eye exam to screen for diabetic retinopathy, a kidney function test, a foot exam, and stricter control over your cholesterol.  
-Cardiovascular screening for adults with routine risk involves an electrocardiogram (ECG) at intervals determined by your doctor.  
-Colorectal cancer screenings should be done for adults age 54-65 with no increased risk factors for colorectal cancer. There are a number of acceptable methods of screening for this type of cancer. Each test has its own benefits and drawbacks. Discuss with your doctor what is most appropriate for you during your annual wellness visit. The different tests include: colonoscopy (considered the best screening method), a fecal occult blood test, a fecal DNA test, and sigmoidoscopy. -Breast cancer screenings are recommended every other year for women of normal risk, age 54-69. 
-Cervical cancer screenings for women over age 72 are only recommended with certain risk factors.  
-All adults born between [facility] should be screened once for Hepatitis C. Here is a list of your current Health Maintenance items (your personalized list of preventive services) with a due date: 
Health Maintenance Due Topic Date Due  
 Annual Well Visit  07/19/2019  Flu Vaccine  08/01/2019  Glaucoma Screening   08/22/2019

## 2019-10-01 ENCOUNTER — CLINICAL SUPPORT (OUTPATIENT)
Dept: INTERNAL MEDICINE CLINIC | Age: 69
End: 2019-10-01

## 2019-10-01 DIAGNOSIS — N39.0 URINARY TRACT INFECTION WITHOUT HEMATURIA, SITE UNSPECIFIED: Primary | ICD-10-CM

## 2019-10-02 RX ORDER — TRIAMTERENE/HYDROCHLOROTHIAZID 37.5-25 MG
TABLET ORAL
Qty: 180 TAB | Refills: 2 | Status: SHIPPED | OUTPATIENT
Start: 2019-10-02 | End: 2020-11-08

## 2019-10-03 LAB — BACTERIA UR CULT: NORMAL

## 2019-11-05 ENCOUNTER — OFFICE VISIT (OUTPATIENT)
Dept: INTERNAL MEDICINE CLINIC | Age: 69
End: 2019-11-05

## 2019-11-05 DIAGNOSIS — E66.9 CLASS 1 OBESITY WITHOUT SERIOUS COMORBIDITY WITH BODY MASS INDEX (BMI) OF 34.0 TO 34.9 IN ADULT, UNSPECIFIED OBESITY TYPE: ICD-10-CM

## 2019-11-05 DIAGNOSIS — I10 ESSENTIAL HYPERTENSION: Primary | ICD-10-CM

## 2019-11-05 DIAGNOSIS — Z23 ENCOUNTER FOR IMMUNIZATION: ICD-10-CM

## 2019-11-05 DIAGNOSIS — R79.89 PRERENAL AZOTEMIA: ICD-10-CM

## 2019-11-05 DIAGNOSIS — I87.2 VENOUS INSUFFICIENCY: ICD-10-CM

## 2019-11-05 NOTE — PATIENT INSTRUCTIONS
Vaccine Information Statement    Influenza (Flu) Vaccine (Inactivated or Recombinant): What You Need to Know    Many Vaccine Information Statements are available in [place] and other languages. See www.immunize.org/vis  Hojas de información sobre vacunas están disponibles en español y en muchos otros idiomas. Visite www.immunize.org/vis    1. Why get vaccinated? Influenza vaccine can prevent influenza (flu). Flu is a contagious disease that spreads around the [facility] every year, usually between October and May. Anyone can get the flu, but it is more dangerous for some people. Infants and young children, people 72years of age and older, pregnant women, and people with certain health conditions or a weakened immune system are at greatest risk of flu complications. Pneumonia, bronchitis, sinus infections and ear infections are examples of flu-related complications. If you have a medical condition, such as heart disease, cancer or diabetes, flu can make it worse. Flu can cause fever and chills, sore throat, muscle aches, fatigue, cough, headache, and runny or stuffy nose. Some people may have vomiting and diarrhea, though this is more common in children than adults. Each year thousands of people in the [facility] die from flu, and many more are hospitalized. Flu vaccine prevents millions of illnesses and flu-related visits to the doctor each year. 2. Influenza vaccines     CDC recommends everyone 10months of age and older get vaccinated every flu season. Children 6 months through 6years of age may need 2 doses during a single flu season. Everyone else needs only 1 dose each flu season. It takes about 2 weeks for protection to develop after vaccination. There are many flu viruses, and they are always changing. Each year a new flu vaccine is made to protect against three or four viruses that are likely to cause disease in the upcoming flu season.  Even when the vaccine doesnt exactly match these viruses, it may still provide some protection. Influenza vaccine does not cause flu. Influenza vaccine may be given at the same time as other vaccines. 3. Talk with your health care provider    Tell your vaccine provider if the person getting the vaccine:   Has had an allergic reaction after a previous dose of influenza vaccine, or has any severe, life-threatening allergies.  Has ever had Guillain-Barré Syndrome (also called GBS). In some cases, your health care provider may decide to postpone influenza vaccination to a future visit. People with minor illnesses, such as a cold, may be vaccinated. People who are moderately or severely ill should usually wait until they recover before getting influenza vaccine. Your health care provider can give you more information. 4. Risks of a reaction     Soreness, redness, and swelling where shot is given, fever, muscle aches, and headache can happen after influenza vaccine.  There may be a very small increased risk of Guillain-Barré Syndrome (GBS) after inactivated influenza vaccine (the flu shot). [facility] children who get the flu shot along with pneumococcal vaccine (PCV13), and/or DTaP vaccine at the same time might be slightly more likely to have a seizure caused by fever. Tell your health care provider if a child who is getting flu vaccine has ever had a seizure. People sometimes faint after medical procedures, including vaccination. Tell your provider if you feel dizzy or have vision changes or ringing in the ears. As with any medicine, there is a very remote chance of a vaccine causing a severe allergic reaction, other serious injury, or death. 5. What if there is a serious problem? An allergic reaction could occur after the vaccinated person leaves the clinic.  If you see signs of a severe allergic reaction (hives, swelling of the face and throat, difficulty breathing, a fast heartbeat, dizziness, or weakness), call 9-1-1 and get the person to the nearest hospital.    For other signs that concern you, call your health care provider. Adverse reactions should be reported to the Vaccine Adverse Event Reporting System (VAERS). Your health care provider will usually file this report, or you can do it yourself. Visit the VAERS website at www.vaers. [facility].gov or call 4-697.469.9477. VAERS is only for reporting reactions, and VAERS staff do not give medical advice. 6. The National Vaccine Injury Compensation Program    The [facility] Vaccine Injury Compensation Program (VICP) is a federal program that was created to compensate people who may have been injured by certain vaccines. Visit the VICP website at www.[facility].gov/vaccinecompensation or call 1-175.954.1315 to learn about the program and about filing a claim. There is a time limit to file a claim for compensation. 7. How can I learn more?  Ask your health care provider.  Call your local or state health department.  Contact the Centers for Disease Control and Prevention (CDC):  - Call 4-438.434.1080 (1-800-CDC-INFO) or  - Visit CDCs influenza website at www.cdc.gov/flu    Vaccine Information Statement (Interim)  Inactivated Influenza Vaccine   8/15/2019  42 LISSETTE Germain Presume 679JG-40   Department of Health and Human Services  Centers for Disease Control and Prevention    Office Use Only

## 2019-11-05 NOTE — PROGRESS NOTES
Chief Complaint   Patient presents with    Joint Pain     1. Have you been to the ER, urgent care clinic since your last visit? Hospitalized since your last visit? No    2. Have you seen or consulted any other health care providers outside of the [address] [place] [address] since your last visit? Include any pap smears or colon screening. No    After obtaining consent, and per orders of Dr. Eriberto See, injection of high dose influenza vaccine. Patient instructed to remain in clinic for 20 minutes afterwards, and to report any adverse reaction to me immediately. 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 She denies any symptoms , reactions or allergies that would exclude them from being immunized today. Risks and adverse reactions were discussed and the VIS was given to them. All questions were addressed. She was observed for 20 min post injection. There were no reactions observed.     Gabriela Noland LPN

## 2019-11-06 LAB
BUN SERPL-MCNC: 16 MG/DL (ref 8–27)
BUN/CREAT SERPL: 16 (ref 12–28)
CALCIUM SERPL-MCNC: 9.9 MG/DL (ref 8.7–10.3)
CHLORIDE SERPL-SCNC: 99 MMOL/L (ref 96–106)
CO2 SERPL-SCNC: 21 MMOL/L (ref 20–29)
CREAT SERPL-MCNC: 0.98 MG/DL (ref 0.57–1)
GLUCOSE SERPL-MCNC: 78 MG/DL (ref 65–99)
POTASSIUM SERPL-SCNC: 4.7 MMOL/L (ref 3.5–5.2)
SODIUM SERPL-SCNC: 141 MMOL/L (ref 134–144)

## 2019-11-10 VITALS
BODY MASS INDEX: 34.86 KG/M2 | TEMPERATURE: 97.8 F | HEIGHT: 64 IN | DIASTOLIC BLOOD PRESSURE: 80 MMHG | RESPIRATION RATE: 16 BRPM | SYSTOLIC BLOOD PRESSURE: 142 MMHG | OXYGEN SATURATION: 98 % | HEART RATE: 71 BPM | WEIGHT: 204.2 LBS

## 2019-11-10 NOTE — PROGRESS NOTES
[address] [facility]  [place] [address]  [address] [place] [address]  Phone:  [phone]  Fax: [phone]       Chief Complaint   Patient presents with    Joint Pain   . SUBJECTIVE:    Too Adams is a 78 y.o. female Comes in for return visit stating that she has done reasonably well. The swelling of her lower extremities has not been that bothersome and is no worse since she reduced her diuretic to one tablet daily. There has been no meaningful weight loss since I last saw her. She wishes to get an immunization today for her influenza. She has seen multiple other physicians, including an endocrinologist, for her thyroid nodule. Current Outpatient Medications   Medication Sig Dispense Refill    triamterene-hydroCHLOROthiazide (MAXZIDE) 37.5-25 mg per tablet TAKE 1 TABLET BY MOUTH DAILY 180 Tab 2    aspirin 81 mg chewable tablet Take 81 mg by mouth daily. Patient states she \"usually takes\" a baby aspirin daily      omega-3 fatty acids-vitamin e 1,000 mg cap Take 1 Cap by mouth.  OTHER \"tumeric\"      CALCIUM CARBONATE/VITAMIN D3 (CALCIUM + D PO) Take  by mouth daily.  multivitamin (ONE A DAY) tablet Take 1 Tab by mouth daily.        Past Medical History:   Diagnosis Date    Hypertension     Thyroid nodule     annual eval with US     Past Surgical History:   Procedure Laterality Date    HX COLONOSCOPY       No Known Allergies      REVIEW OF SYSTEMS:  General: negative for - chills or fever  ENT: negative for - headaches, nasal congestion or tinnitus  Respiratory: negative for - cough, hemoptysis, shortness of breath or wheezing  Cardiovascular : negative for - chest pain, edema, palpitations or shortness of breath  Gastrointestinal: negative for - abdominal pain, blood in stools, heartburn or nausea/vomiting  Genito-Urinary: no dysuria, trouble voiding, or hematuria  Musculoskeletal: negative for - gait disturbance, joint pain, joint stiffness or joint swelling  Neurological: no TIA or stroke symptoms  Hematologic: no bruises, no bleeding, no swollen glands  Integument: no lumps, mole changes, nail changes or rash  Endocrine: no malaise/lethargy or unexpected weight changes      Social History     Socioeconomic History    Marital status:      Spouse name: Not on file    Number of children: 1    Years of education: 16+    Highest education level: Bachelor's degree (e.g., BA, AB, BS)   Occupational History    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   last year from pulmonary fibrosis. Daughter lives in [facility]----has 2 children--- daughter and  of both      Family History   Problem Relation Age of Onset    Cancer Mother         Colon CA    Hypertension Mother     Heart Disease Father         heart attack age 52, smoker    Hypertension Father     Cancer Sister         Colon CA    Hypertension Sister     Cancer Brother         Esophageal    Hypertension Brother     Cancer Brother         Multiple myeloma    Hypertension Brother     Heart Disease Brother     Hypertension Brother     Hypertension Brother        OBJECTIVE:    Visit Vitals  /80   Pulse 71   Temp 97.8 °F (36.6 °C) (Oral)   Resp 16   Ht 5' 4\" (1.626 m)   Wt 204 lb 3.2 oz (92.6 kg)   SpO2 98%   BMI 35.05 kg/m²     CONSTITUTIONAL: well , well nourished, appears age appropriate  EYES: perrla, eom intact  ENMT:moist mucous membranes, pharynx clear  NECK: supple.  Thyroid normal,no JVD  RESPIRATORY: Chest: clear to ascultation and percussion CARDIOVASCULAR: Heart: regular rate and rhythm  GASTROINTESTINAL: Abdomen: soft, bowel sounds active  HEMATOLOGIC: no pathological lymph nodes palpated  MUSCULOSKELETAL: Extremities: 1+ edema distally R>L, pulse 1+   INTEGUMENT: No unusual rashes or suspicious skin lesions noted. Nails appear normal.  NEUROLOGIC: non-focal exam   MENTAL STATUS: alert and oriented, appropriate affect      ASSESSMENT:  1. Essential hypertension    2. Prerenal azotemia    3. Venous insufficiency    4. Class 1 obesity without serious comorbidity with body mass index (BMI) of 34.0 to 34.9 in adult, unspecified obesity type    5. Encounter for immunization        PLAN:    1. Her blood pressure today is reasonable. No adjustment will be made just yet. If her systolics remain 752 or greater than I will add an additional medication, which will obviously not be another diuretic. 2. She did develop a prerenal azotemia, but this was predominantly when she was taking two antihypertensive medications. I will check a BMP today to make sure this has indeed resolved. 3. There was no change in the edema of her lower extremities once her diuretic was indeed reduced as I suspected. The best treatment for this is support stockings. I have talked to the patient at length about this disease entity. 4. I encouraged weight reduction. This can be accomplished by eating meals, eliminating snacks and avoiding the consumption of processed carbohydrates. 5. She is going to visit her daughter and  in [place], [place], both of whom are . .  Orders Placed This Encounter    Influenza virus vaccine ([place]) 72 years and older ([address])    METABOLIC PANEL, BASIC         Follow-up and Dispositions    · Return in about 3 months (around 2/5/2020).            Ana Maria Reis MD

## 2020-02-06 ENCOUNTER — OFFICE VISIT (OUTPATIENT)
Dept: INTERNAL MEDICINE CLINIC | Age: 70
End: 2020-02-06

## 2020-02-06 VITALS
WEIGHT: 202.9 LBS | BODY MASS INDEX: 34.64 KG/M2 | DIASTOLIC BLOOD PRESSURE: 83 MMHG | TEMPERATURE: 98.1 F | HEART RATE: 84 BPM | SYSTOLIC BLOOD PRESSURE: 133 MMHG | RESPIRATION RATE: 16 BRPM | HEIGHT: 64 IN | OXYGEN SATURATION: 98 %

## 2020-02-06 DIAGNOSIS — I10 ESSENTIAL HYPERTENSION: Primary | ICD-10-CM

## 2020-02-06 DIAGNOSIS — M79.10 MYALGIA: ICD-10-CM

## 2020-02-06 DIAGNOSIS — M25.50 ARTHRALGIA, UNSPECIFIED JOINT: ICD-10-CM

## 2020-02-06 DIAGNOSIS — E66.9 CLASS 1 OBESITY WITHOUT SERIOUS COMORBIDITY WITH BODY MASS INDEX (BMI) OF 34.0 TO 34.9 IN ADULT, UNSPECIFIED OBESITY TYPE: ICD-10-CM

## 2020-02-06 DIAGNOSIS — E04.2 MULTINODULAR NON-TOXIC GOITER: ICD-10-CM

## 2020-02-06 DIAGNOSIS — F43.21 GRIEVING: ICD-10-CM

## 2020-02-06 NOTE — PROGRESS NOTES
Chief Complaint   Patient presents with    Neck Pain     1. Have you been to the ER, urgent care clinic since your last visit? Hospitalized since your last visit? No    2. Have you seen or consulted any other health care providers outside of the [address] [place] [address] since your last visit? Include any pap smears or colon screening.  No

## 2020-02-08 NOTE — PROGRESS NOTES
[address] [facility]  [place] [address]  [address] [place] [address]  Phone:  [phone]  Fax: [phone]       Chief Complaint   Patient presents with    Neck Pain   . SUBJECTIVE:    Dayne Zamora is a 78 y.o. female Comes in for return visit stating that she has done reasonably well. When she went to [place], and while out there specifically, she developed an aching and soreness in her back, shoulders and legs. It lasted only while she was out there and once she returned it has improved significantly. She denies similar symptoms before. She does have a multinodular goiter and sees an endocrinologist regarding this with thyroid scans done yearly. She saw a  while in [place], who suggested a lot of weird things to assay. She is grieving the death of her sister, who is Marta Shepard, which was unexpected. She has a past history of primary hypertension. Current Outpatient Medications   Medication Sig Dispense Refill    triamterene-hydroCHLOROthiazide (MAXZIDE) 37.5-25 mg per tablet TAKE 1 TABLET BY MOUTH DAILY 180 Tab 2    aspirin 81 mg chewable tablet Take 81 mg by mouth daily. Patient states she \"usually takes\" a baby aspirin daily      omega-3 fatty acids-vitamin e 1,000 mg cap Take 1 Cap by mouth.  OTHER \"tumeric\"      CALCIUM CARBONATE/VITAMIN D3 (CALCIUM + D PO) Take  by mouth daily.  multivitamin (ONE A DAY) tablet Take 1 Tab by mouth daily.        Past Medical History:   Diagnosis Date    Hypertension     Thyroid nodule     annual eval with US     Past Surgical History:   Procedure Laterality Date    HX COLONOSCOPY       No Known Allergies      REVIEW OF SYSTEMS:  General: negative for - chills or fever  ENT: negative for - headaches, nasal congestion or tinnitus  Respiratory: negative for - cough, hemoptysis, shortness of breath or wheezing  Cardiovascular : negative for - chest pain, edema, palpitations or shortness of breath  Gastrointestinal: negative for - abdominal pain, blood in stools, heartburn or nausea/vomiting  Genito-Urinary: no dysuria, trouble voiding, or hematuria  Musculoskeletal: negative for - gait disturbance, joint pain, joint stiffness or joint swelling  Neurological: no TIA or stroke symptoms  Hematologic: no bruises, no bleeding, no swollen glands  Integument: no lumps, mole changes, nail changes or rash  Endocrine: no malaise/lethargy or unexpected weight changes      Social History     Socioeconomic History    Marital status:      Spouse name: Not on file    Number of children: 1    Years of education: 16+    Highest education level: Bachelor's degree (e.g., BA, AB, BS)   Occupational History    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   last year from pulmonary fibrosis.     Daughter lives in [facility]----has 2 children--- daughter and  are both      Family History   Problem Relation Age of Onset    Cancer Mother         Colon CA    Hypertension Mother     Heart Disease Father         heart attack age 52, smoker    Hypertension Father     Cancer Sister         Colon CA    Hypertension Sister     Cancer Brother         Esophageal    Hypertension Brother     Cancer Brother         Multiple myeloma    Hypertension Brother     Heart Disease Brother     Hypertension Brother     Hypertension Brother        OBJECTIVE:    Visit Vitals  /83   Pulse 84   Temp 98.1 °F (36.7 °C) (Oral)   Resp 16   Ht 5' 4\" (1.626 m)   Wt 202 lb 14.4 oz (92 kg)   SpO2 98%   BMI 34.83 kg/m² CONSTITUTIONAL: well , well nourished, appears age appropriate  EYES: perrla, eom intact  ENMT:moist mucous membranes, pharynx clear  NECK: supple. Thyroid normal  RESPIRATORY: Chest: clear to ascultation and percussion   CARDIOVASCULAR: Heart: regular rate and rhythm  GASTROINTESTINAL: Abdomen: soft, bowel sounds active  HEMATOLOGIC: no pathological lymph nodes palpated  MUSCULOSKELETAL: Extremities: trace edema, pulse 1+, normal range of motion of all joints, no synovial thickening  INTEGUMENT: No unusual rashes or suspicious skin lesions noted. Nails appear normal.  NEUROLOGIC: non-focal exam   MENTAL STATUS: alert and oriented, appropriate affect      ASSESSMENT:  1. Essential hypertension    2. Class 1 obesity without serious comorbidity with body mass index (BMI) of 34.0 to 34.9 in adult, unspecified obesity type    3. Multinodular non-toxic goiter    4. Myalgia    5. Arthralgia, unspecified joint    6. Grieving        PLAN:    1. Blood pressure is excellent today, no adjustments are made. 2. She needs to lose weight. This can be accomplished by eating meals, eliminating snacks and avoiding the consumption of processed carbohydrates. 3. She has a very mild multinodular goiter and follows up with endocrinology. 4. As far as her myalgias and arthralgias are concerned, I do not find any obvious etiology for now. If this persisted to the extent that it was present in [place], this raises the question of polymyalgia rheumatica. Observation for now and the patient agrees. 5. She continues to grieve the death of her sister. Follow-up and Dispositions    · Return in about 3 months (around 5/6/2020).            Alec Stack MD

## 2020-03-05 ENCOUNTER — OFFICE VISIT (OUTPATIENT)
Dept: INTERNAL MEDICINE CLINIC | Age: 70
End: 2020-03-05

## 2020-03-05 VITALS
BODY MASS INDEX: 34.69 KG/M2 | SYSTOLIC BLOOD PRESSURE: 136 MMHG | TEMPERATURE: 97.9 F | OXYGEN SATURATION: 98 % | HEIGHT: 64 IN | WEIGHT: 203.2 LBS | DIASTOLIC BLOOD PRESSURE: 85 MMHG | HEART RATE: 77 BPM | RESPIRATION RATE: 16 BRPM

## 2020-03-05 DIAGNOSIS — R00.2 PALPITATIONS: Primary | ICD-10-CM

## 2020-03-05 DIAGNOSIS — E04.2 MULTINODULAR NON-TOXIC GOITER: ICD-10-CM

## 2020-03-05 DIAGNOSIS — I10 ESSENTIAL HYPERTENSION: ICD-10-CM

## 2020-03-05 DIAGNOSIS — E66.9 CLASS 1 OBESITY WITHOUT SERIOUS COMORBIDITY WITH BODY MASS INDEX (BMI) OF 34.0 TO 34.9 IN ADULT, UNSPECIFIED OBESITY TYPE: ICD-10-CM

## 2020-03-05 NOTE — PROGRESS NOTES
Chief Complaint   Patient presents with    Hypertension     Patient states that she would like a \"good check up\" on her blood pressure. She states that she has some \"fluttering\" in her throat at night when she lays down and a \"little\" cough. 1. Have you been to the ER, urgent care clinic since your last visit? Hospitalized since your last visit? No    2. Have you seen or consulted any other health care providers outside of the [address] [place] [address] since your last visit? Include any pap smears or colon screening.  No

## 2020-03-07 NOTE — PROGRESS NOTES
[facility]  [place] [address]  [address] [place] [address]  Phone:  [phone]  Fax: [phone]       Chief Complaint   Patient presents with    Hypertension     Patient states that she would like a \"good check up\" on her blood pressure. She states that she has some \"fluttering\" in her throat at night when she lays down and a \"little\" cough. .      SUBJECTIVE:    Luma Rutledge is a 78 y.o. female Comes in complaining of episodic fluttering sensation in her chest.  This is very short lived, generally for several seconds and abates. This, however, is quite bothersome to her. There are no other associated symptoms. Interestingly, she states this happened to her in the past and she had a full workup, including an Event monitor and echocardiogram, all of which were negative. When I review the chart, this occurred at the time of her 's death. Most recently she has been stressed because of the most recent death of her sister, which was unexpected. She has a past history of primary hypertension, obesity and a multinodular goiter, which is non toxic. Current Outpatient Medications   Medication Sig Dispense Refill    COLLAGEN by Does Not Apply route.  triamterene-hydroCHLOROthiazide (MAXZIDE) 37.5-25 mg per tablet TAKE 1 TABLET BY MOUTH DAILY 180 Tab 2    aspirin 81 mg chewable tablet Take 81 mg by mouth daily. Patient states she \"usually takes\" a baby aspirin daily      omega-3 fatty acids-vitamin e 1,000 mg cap Take 1 Cap by mouth.  OTHER \"tumeric\"      CALCIUM CARBONATE/VITAMIN D3 (CALCIUM + D PO) Take  by mouth daily.  multivitamin (ONE A DAY) tablet Take 1 Tab by mouth daily.        Past Medical History:   Diagnosis Date    Hypertension     Thyroid nodule     annual eval with US     Past Surgical History:   Procedure Laterality Date    HX COLONOSCOPY       No Known Allergies      REVIEW OF SYSTEMS:  General: negative for - chills or fever  ENT: negative for - headaches, nasal congestion or tinnitus  Respiratory: negative for - cough, hemoptysis, shortness of breath or wheezing  Cardiovascular : negative for - chest pain, edema, palpitations or shortness of breath  Gastrointestinal: negative for - abdominal pain, blood in stools, heartburn or nausea/vomiting  Genito-Urinary: no dysuria, trouble voiding, or hematuria  Musculoskeletal: negative for - gait disturbance, joint pain, joint stiffness or joint swelling  Neurological: no TIA or stroke symptoms  Hematologic: no bruises, no bleeding, no swollen glands  Integument: no lumps, mole changes, nail changes or rash  Endocrine: no malaise/lethargy or unexpected weight changes      Social History     Socioeconomic History    Marital status:      Spouse name: Not on file    Number of children: 1    Years of education: 16+    Highest education level: Bachelor's degree (e.g., BA, AB, BS)   Occupational History    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   last year from pulmonary fibrosis.     Daughter lives in [facility]----has 2 children--- daughter and  are both      Family History   Problem Relation Age of Onset    Cancer Mother         Colon CA    Hypertension Mother     Heart Disease Father         heart attack age 52, smoker    Hypertension Father     Cancer Sister         Colon CA    Hypertension Sister     Cancer Brother         Esophageal    Hypertension Brother     Cancer Brother         Multiple myeloma    Hypertension Brother     Heart Disease Brother     Hypertension Brother     Hypertension Brother        OBJECTIVE:    Visit Vitals  /85   Pulse 77   Temp 97.9 °F (36.6 °C) (Oral)   Resp 16   Ht 5' 4\" (1.626 m)   Wt 203 lb 3.2 oz (92.2 kg)   SpO2 98%   BMI 34.88 kg/m²     CONSTITUTIONAL: well , well nourished, appears age appropriate  EYES: perrla, eom intact  ENMT:moist mucous membranes, pharynx clear  NECK: supple. Nodular goiter  RESPIRATORY: Chest: clear to ascultation and percussion   CARDIOVASCULAR: Heart: regular rate and rhythm  GASTROINTESTINAL: Abdomen: soft, bowel sounds active  HEMATOLOGIC: no pathological lymph nodes palpated  MUSCULOSKELETAL: Extremities: no edema, pulse 1+   INTEGUMENT: No unusual rashes or suspicious skin lesions noted. Nails appear normal.  NEUROLOGIC: non-focal exam   MENTAL STATUS: alert and oriented, appropriate affect      ASSESSMENT:  1. Palpitations    2. Multinodular non-toxic goiter    3. Class 1 obesity without serious comorbidity with body mass index (BMI) of 34.0 to 34.9 in adult, unspecified obesity type    4. Essential hypertension        PLAN:    1. I do not find any obvious rhythm disturbance today, but this is again for a short span, which is 20 seconds of auscultation. I could pursue an Event monitor, but after further discussion she really does not want to have this. She is making an association between a stressful situation that is occurring and that which occurred before. Observation for now unless new symptoms develop. 2. She is also seeing an endocrinologist for multinodular goiter, non toxic. She wants to change to some place closer to her. She will keep this appointment and then will make an appointment to see an endocrinologist locally. 3. I again remind her to minimize weight gain. This can be accomplished by eating meals, eliminating snacks and avoiding the consumption of processed carbs. 4. Her BP is excellent today, no adjustments are made.     .  Orders Placed This Encounter    COLLAGEN Follow-up and Dispositions    · Return keep old apt.            Tana Burks MD

## 2020-05-15 ENCOUNTER — VIRTUAL VISIT (OUTPATIENT)
Dept: INTERNAL MEDICINE CLINIC | Age: 70
End: 2020-05-15

## 2020-05-15 DIAGNOSIS — E04.2 MULTINODULAR NON-TOXIC GOITER: ICD-10-CM

## 2020-05-15 DIAGNOSIS — M54.12 CERVICAL RADICULOPATHY: Primary | ICD-10-CM

## 2020-05-15 DIAGNOSIS — I10 ESSENTIAL HYPERTENSION: ICD-10-CM

## 2020-05-15 DIAGNOSIS — M47.812 CERVICAL SPONDYLOSIS: ICD-10-CM

## 2020-05-15 DIAGNOSIS — R79.89 PRERENAL AZOTEMIA: ICD-10-CM

## 2020-05-15 NOTE — PROGRESS NOTES
1. Have you been to the ER, urgent care clinic since your last visit? Hospitalized since your last visit? No    2. Have you seen or consulted any other health care providers outside of the [address] [place] [address] since your last visit? Include any pap smears or colon screening.  No    Wants to discuss right shoulder pain

## 2020-05-16 RX ORDER — MELOXICAM 15 MG/1
15 TABLET ORAL DAILY
Qty: 30 TAB | Refills: 0 | Status: SHIPPED | OUTPATIENT
Start: 2020-05-16 | End: 2021-02-05 | Stop reason: ALTCHOICE

## 2020-05-16 NOTE — PROGRESS NOTES
Last Webster is a 78 y.o. female who was seen by synchronous (real-time) audio-video technology on 5/15/2020. Consent: Last Webster, who was seen by synchronous (real-time) audio-video technology, and/or her healthcare decision maker, is aware that this patient-initiated, Telehealth encounter on 5/15/2020 is a billable service, with coverage as determined by her insurance carrier. She is aware that she may receive a bill and has provided verbal consent to proceed: Yes. Assessment & Plan:   Diagnoses and all orders for this visit:    1. Cervical radiculopathy  -     meloxicam (MOBIC) 15 mg tablet; Take 1 Tab by mouth daily. 2. Cervical spondylosis    3. Essential hypertension    4. Prerenal azotemia    5. Multinodular non-toxic goiter    1. Patient does have a history of a cervical spondylosis. Her current symptoms strongly suggest that she has a cervical radiculopathy involving her right side. I gave her options of systemic steroids versus  an NSAID. she chooses the latter. 2.  The patient is told to increase her intake of liquids in view of the fact that she is on a diuretic which reduces her GFR while taking the NSAID. 3.  She has a multinodular goiter which is followed by an endocrinologist.  In reality this has been stable for well over a decade. 4.  She is compliant with her antihypertensive medication. She has not had a cardiovascular event. Her overall cardiovascular risk is less than 10.5%. Subjective:   Last Webster is a 78 y.o. female who was seen for Shoulder Pain  Patient comes in stating that she has pain in her right shoulder radiating down her right arm accompanied by numbness and tingling of her fingers. This is been present for the last several weeks. There was no history of trauma or similar symptoms in the past.  The discomfort is somewhat worse nocturnally.     She does have a multinodular goiter and is actively followed by endocrinology    She has a past history of primary hypertension for which she takes a diuretic. Finally she does have a history of mild prerenal azotemia which is probably improved since I reduced her Maxide from 2 tablets a day to 1 tablet a day. Prior to Admission medications    Medication Sig Start Date End Date Taking? Authorizing Provider   meloxicam (MOBIC) 15 mg tablet Take 1 Tab by mouth daily. 5/16/20  Yes Clarisa Renteria MD   COLLAGEN by Does Not Apply route. Yes Provider, Historical   triamterene-hydroCHLOROthiazide (MAXZIDE) 37.5-25 mg per tablet TAKE 1 TABLET BY MOUTH DAILY 10/2/19  Yes Clarisa Renteria MD   aspirin 81 mg chewable tablet Take 81 mg by mouth daily. Patient states she \"usually takes\" a baby aspirin daily   Yes Provider, Historical   omega-3 fatty acids-vitamin e 1,000 mg cap Take 1 Cap by mouth. Yes Provider, Historical   OTHER \"tumeric\"   Yes Provider, Historical   CALCIUM CARBONATE/VITAMIN D3 (CALCIUM + D PO) Take  by mouth daily. Yes Provider, Historical   multivitamin (ONE A DAY) tablet Take 1 Tab by mouth daily. Yes Provider, Historical     No Known Allergies    Current Outpatient Medications   Medication Sig Dispense Refill    meloxicam (MOBIC) 15 mg tablet Take 1 Tab by mouth daily. 30 Tab 0    COLLAGEN by Does Not Apply route.  triamterene-hydroCHLOROthiazide (MAXZIDE) 37.5-25 mg per tablet TAKE 1 TABLET BY MOUTH DAILY 180 Tab 2    aspirin 81 mg chewable tablet Take 81 mg by mouth daily. Patient states she \"usually takes\" a baby aspirin daily      omega-3 fatty acids-vitamin e 1,000 mg cap Take 1 Cap by mouth.  OTHER \"tumeric\"      CALCIUM CARBONATE/VITAMIN D3 (CALCIUM + D PO) Take  by mouth daily.  multivitamin (ONE A DAY) tablet Take 1 Tab by mouth daily.        No Known Allergies  Past Medical History:   Diagnosis Date    Hypertension     Thyroid nodule     annual eval with US     Past Surgical History:   Procedure Laterality Date    HX COLONOSCOPY       Family History   Problem Relation Age of Onset    Cancer Mother         Colon CA    Hypertension Mother     Heart Disease Father         heart attack age 52, smoker    Hypertension Father     Cancer Sister         Colon CA    Hypertension Sister     Cancer Brother         Esophageal    Hypertension Brother     Cancer Brother         Multiple myeloma    Hypertension Brother     Heart Disease Brother     Hypertension Brother     Hypertension Brother        ROS:14 point ROS neg. Objective:   Vital Signs: (As obtained by patient/caregiver at home)  There were no vitals taken for this visit.      [INSTRUCTIONS:  \"[x]\" Indicates a positive item  \"[]\" Indicates a negative item  -- DELETE ALL ITEMS NOT EXAMINED]    Constitutional: [x] Appears well-developed and well-nourished [x] No apparent distress      [] Abnormal -     Mental status: [x] Alert and awake  [x] Oriented to person/place/time [x] Able to follow commands    [] Abnormal -     Eyes:   EOM    [x]  Normal    [] Abnormal -   Sclera  [x]  Normal    [] Abnormal -          Discharge [x]  None visible   [] Abnormal -     HENT: [x] Normocephalic, atraumatic  [] Abnormal -   [x] Mouth/Throat: Mucous membranes are moist    External Ears [x] Normal  [] Abnormal -    Neck: [x] No visualized mass [] Abnormal -     Pulmonary/Chest: [x] Respiratory effort normal   [x] No visualized signs of difficulty breathing or respiratory distress        [] Abnormal -      Musculoskeletal:   [x] Normal gait with no signs of ataxia         [x] Normal range of motion of neck        [] Abnormal -     Neurological:        [x] No Facial Asymmetry (Cranial nerve 7 motor function) (limited exam due to video visit)          [x] No gaze palsy        [] Abnormal -          Skin:        [x] No significant exanthematous lesions or discoloration noted on facial skin         [] Abnormal -            Psychiatric:       [x] Normal Affect [] Abnormal -        [x] No Hallucinations    Other pertinent observable physical exam findings:-Normal range of motion right shoulder        We discussed the expected course, resolution and complications of the diagnosis(es) in detail. Medication risks, benefits, costs, interactions, and alternatives were discussed as indicated. I advised her to contact the office if her condition worsens, changes or fails to improve as anticipated. She expressed understanding with the diagnosis(es) and plan. Woodrow Contreras is a 78 y.o. female who was evaluated by a video visit encounter for concerns as above. Patient identification was verified prior to start of the visit. A caregiver was present when appropriate. Due to this being a TeleHealth encounter (During NSRQW-07 public health emergency), evaluation of the following organ systems was limited: Vitals/Constitutional/EENT/Resp/CV/GI//MS/Neuro/Skin/Heme-Lymph-Imm. Pursuant to the emergency declaration under the [facility] waiver authority and the [employer] Act, this Virtual  Visit was conducted, with patient's (and/or legal guardian's) consent, to reduce the patient's risk of exposure to COVID-19 and provide necessary medical care. Services were provided through a video synchronous discussion virtually to substitute for in-person clinic visit. Patient and provider were located at their individual homes. Please note that this dictation was completed with [employer], the [employer] voice recognition software. Quite often unanticipated grammatical, syntax, homophones, and other interpretive errors are inadvertently transcribed by the computer software. Please disregard these errors. Please excuse any errors that have escaped final proofreading. Thank you.     Sho Pulliam MD

## 2020-07-07 ENCOUNTER — TELEPHONE (OUTPATIENT)
Dept: CARDIOLOGY CLINIC | Age: 70
End: 2020-07-07

## 2020-07-07 NOTE — TELEPHONE ENCOUNTER
Dr. Marley Quiroz-  Patient's end of service event monitor results are on your desk for review. We told her we would schedule any follow up needed when we received results.

## 2020-07-07 NOTE — TELEPHONE ENCOUNTER
Called patient. Verified patient's identity with two identifiers. Explained we do have some recordings, but not the final report. Patient returned monitor last week. Since it was a holiday weekend, I told her it may take a few extra days before we receive final report to be read, but that we will call once we have it to notify her of results and any f/u needed. Patient verbalized understanding and denied further questions or concerns.

## 2020-07-07 NOTE — TELEPHONE ENCOUNTER
Just a few extra skipped beats, nothing serious or dangerous. We can try low dose beta blocker to help if she wants.

## 2020-07-08 NOTE — TELEPHONE ENCOUNTER
Called patient. Verified patient's identity with two identifiers. Notified patient of results and Dr. Natalio Nye message. She said she would like to follow up in office. Appointment scheduled for Wed 7/15 at 2:20 pm. Patient verbalized understanding and denied further questions or concerns.         Future Appointments   Date Time Provider Yomi Hidalgo   7/15/2020 11:30 AM Oretha Seip, MD [address]   7/15/2020  2:20 PM Keshav Lang  [address]

## 2020-07-13 ENCOUNTER — OFFICE VISIT (OUTPATIENT)
Dept: CARDIOLOGY CLINIC | Age: 70
End: 2020-07-13

## 2020-07-13 VITALS
SYSTOLIC BLOOD PRESSURE: 122 MMHG | OXYGEN SATURATION: 96 % | HEART RATE: 88 BPM | HEIGHT: 64 IN | DIASTOLIC BLOOD PRESSURE: 82 MMHG | WEIGHT: 195.4 LBS | BODY MASS INDEX: 33.36 KG/M2

## 2020-07-13 DIAGNOSIS — R00.2 PALPITATIONS: Primary | ICD-10-CM

## 2020-07-13 DIAGNOSIS — I49.3 PVC (PREMATURE VENTRICULAR CONTRACTION): ICD-10-CM

## 2020-07-13 DIAGNOSIS — Z82.49 FAMILY HISTORY OF PREMATURE CAD: ICD-10-CM

## 2020-07-13 DIAGNOSIS — I10 ESSENTIAL HYPERTENSION: ICD-10-CM

## 2020-07-13 NOTE — PROGRESS NOTES
HISTORY OF PRESENT ILLNESS  Socorro Medeiros is a 78 y.o. female     SUMMARY:   Problem List  Date Reviewed: 7/13/2020          Codes Class Noted    Pain in both lower extremities ICD-10-CM: M79.604, M79.605  ICD-9-CM: 729.5  9/26/2019        Family history of colon cancer ICD-10-CM: Z80.0  ICD-9-CM: V16.0  9/26/2019        Primary osteoarthritis ICD-10-CM: M19.91  ICD-9-CM: 715.10  9/26/2019        Palpitations ICD-10-CM: R00.2  ICD-9-CM: 785.1  6/4/2018    Overview Signed 6/4/2018  9:02 AM by Mariana Brody MD     3/18 echo normal lvef, mild mr and tr without pul htn  3/18 event monitor, nsr             Left atrial enlargement ICD-10-CM: I51.7  ICD-9-CM: 429.3  2/27/2018        Change in bowel habits ICD-10-CM: R19.4  ICD-9-CM: 787.99  3/30/2016        Obesity ICD-10-CM: E66.9  ICD-9-CM: 278.00  6/15/2015        Hypertension ICD-10-CM: I10  ICD-9-CM: 401.9  12/2/2014        Multinodular non-toxic goiter ICD-10-CM: E04.2  ICD-9-CM: 241.1  12/2/2014              Current Outpatient Medications on File Prior to Visit   Medication Sig    meloxicam (MOBIC) 15 mg tablet Take 1 Tab by mouth daily.  COLLAGEN by Does Not Apply route.  triamterene-hydroCHLOROthiazide (MAXZIDE) 37.5-25 mg per tablet TAKE 1 TABLET BY MOUTH DAILY    aspirin 81 mg chewable tablet Take 81 mg by mouth daily. Patient states she \"usually takes\" a baby aspirin daily    omega-3 fatty acids-vitamin e 1,000 mg cap Take 1 Cap by mouth.  OTHER \"tumeric\"    CALCIUM CARBONATE/VITAMIN D3 (CALCIUM + D PO) Take  by mouth daily.  multivitamin (ONE A DAY) tablet Take 1 Tab by mouth daily. No current facility-administered medications on file prior to visit. CARDIOLOGY STUDIES TO DATE:  3/18 echo normal lvef, mild mr and tr without pul htn  3/18 event monitor, nsr  6/20 rare pacs inconsistent associated with flutter, skipped beats.  No sig tachy or roberto    Chief Complaint   Patient presents with    Irregular Heart Beat HPI : Ms. Zayda Carlson called last month with palpitations. It was somewhat similar to what she had a couple of years ago when we first met. It turns out her sister  suddenly in early winter and since we last saw her she has lost two brothers as well. She stays quite active around her home and does a lot of yard work with no symptoms suggestive of angina or heart failure. She mostly notices the palpitations at night when she is quiet. Interestingly on her more recent event monitor, she had multiple recordings and symptoms, which were associated with normal heart rhythm.          CARDIAC ROS:   negative for chest pain, dyspnea, syncope, orthopnea, paroxysmal nocturnal dyspnea, exertional chest pressure/discomfort, claudication, lower extremity edema    Family History   Problem Relation Age of Onset    Cancer Mother         Colon CA    Hypertension Mother     Heart Disease Father         heart attack age 52, smoker    Hypertension Father     Cancer Sister         Colon CA    Hypertension Sister     Cancer Brother         Esophageal    Hypertension Brother     Cancer Brother         Multiple myeloma    Hypertension Brother     Heart Disease Brother     Hypertension Brother     Hypertension Brother        Past Medical History:   Diagnosis Date    Hypertension     Thyroid nodule     annual eval with US       GENERAL ROS:  A comprehensive review of systems was negative except for: Musculoskeletal: positive for arthralgias    Visit Vitals  /82 (BP 1 Location: Left arm, BP Patient Position: Sitting)   Pulse 88   Ht 5' 4\" (1.626 m)   Wt 195 lb 6.4 oz (88.6 kg)   SpO2 96%   BMI 33.54 kg/m²       Wt Readings from Last 3 Encounters:   20 195 lb 6.4 oz (88.6 kg)   20 203 lb 3.2 oz (92.2 kg)   20 202 lb 14.4 oz (92 kg)            BP Readings from Last 3 Encounters:   20 122/82   20 136/85   20 133/83       PHYSICAL EXAM  General appearance: alert, cooperative, no distress, appears stated age  Neurologic: Alert and oriented X 3  Neck: supple, symmetrical, trachea midline, no adenopathy, no carotid bruit and no JVD  Lungs: clear to auscultation bilaterally  Heart: regular rate and rhythm, S1, S2 normal, no murmur, click, rub or gallop  Extremities: extremities normal, atraumatic, no cyanosis or edema    Lab Results   Component Value Date/Time    Cholesterol, total 186 09/26/2019 11:39 AM    Cholesterol, total 184 11/05/2018 09:30 AM    Cholesterol, total 155 02/27/2018 10:30 AM    Cholesterol, total 168 09/14/2017 10:27 AM    Cholesterol, total 181 03/13/2017 09:19 AM    HDL Cholesterol 87 09/26/2019 11:39 AM    HDL Cholesterol 78 11/05/2018 09:30 AM    HDL Cholesterol 78 02/27/2018 10:30 AM    HDL Cholesterol 74 09/14/2017 10:27 AM    HDL Cholesterol 76 03/13/2017 09:19 AM    LDL, calculated 87 09/26/2019 11:39 AM    LDL, calculated 93 11/05/2018 09:30 AM    LDL, calculated 62 02/27/2018 10:30 AM    LDL, calculated 80 09/14/2017 10:27 AM    LDL, calculated 87 03/13/2017 09:19 AM    Triglyceride 61 09/26/2019 11:39 AM    Triglyceride 67 11/05/2018 09:30 AM    Triglyceride 75 02/27/2018 10:30 AM    Triglyceride 72 09/14/2017 10:27 AM    Triglyceride 92 03/13/2017 09:19 AM    CHOL/HDL Ratio 2.6 01/11/2010 10:53 AM    CHOL/HDL Ratio 2.3 02/02/2009 09:50 AM     ASSESSMENT :      I reassured Ms. Karly Meyer that these palpitations and PVCs that she was having are really of no concern, and that we could certainly try a low dose beta blocker to see if that helps. She is not interested in taking any more medication at this point, which is completely reasonable. She needs no further cardiac testing at this time. current treatment plan is effective, no change in therapy  lab results and schedule of future lab studies reviewed with patient  reviewed diet, exercise and weight control    Encounter Diagnoses   Name Primary?     Palpitations Yes    Essential hypertension     Family history of premature CAD     PVC (premature ventricular contraction)      No orders of the defined types were placed in this encounter. Follow-up and Dispositions    · Return in about 6 months (around 1/13/2021). Bobbi Owens MD  7/13/2020  Please note that this dictation was completed with [employer], the computer voice recognition software. Quite often unanticipated grammatical, syntax, homophones, and other interpretive errors are inadvertently transcribed by the computer software. Please disregard these errors. Please excuse any errors that have escaped final proofreading. Thank you.

## 2020-07-30 ENCOUNTER — OFFICE VISIT (OUTPATIENT)
Dept: INTERNAL MEDICINE CLINIC | Age: 70
End: 2020-07-30

## 2020-07-30 VITALS
HEART RATE: 95 BPM | HEIGHT: 64 IN | SYSTOLIC BLOOD PRESSURE: 122 MMHG | TEMPERATURE: 98.5 F | WEIGHT: 196 LBS | OXYGEN SATURATION: 97 % | DIASTOLIC BLOOD PRESSURE: 72 MMHG | RESPIRATION RATE: 16 BRPM | BODY MASS INDEX: 33.46 KG/M2

## 2020-07-30 DIAGNOSIS — R00.2 PALPITATIONS: ICD-10-CM

## 2020-07-30 DIAGNOSIS — M17.0 PRIMARY OSTEOARTHRITIS OF BOTH KNEES: ICD-10-CM

## 2020-07-30 DIAGNOSIS — I87.2 VENOUS INSUFFICIENCY: ICD-10-CM

## 2020-07-30 DIAGNOSIS — E66.9 CLASS 1 OBESITY WITHOUT SERIOUS COMORBIDITY WITH BODY MASS INDEX (BMI) OF 34.0 TO 34.9 IN ADULT, UNSPECIFIED OBESITY TYPE: ICD-10-CM

## 2020-07-30 DIAGNOSIS — I10 ESSENTIAL HYPERTENSION: Primary | ICD-10-CM

## 2020-07-30 DIAGNOSIS — E04.2 MULTINODULAR NON-TOXIC GOITER: ICD-10-CM

## 2020-07-30 NOTE — PROGRESS NOTES
Chief Complaint   Patient presents with    Hypertension     4 month follow up      1. Have you been to the ER, urgent care clinic since your last visit? Hospitalized since your last visit? No    2. Have you seen or consulted any other health care providers outside of the [address] [place] [address] since your last visit? Include any pap smears or colon screening.  No

## 2020-07-31 NOTE — PROGRESS NOTES
[address] [facility]  [place] [address]  [address] [place] [id number]  Phone:  [phone]  Fax: [phone]       Chief Complaint   Patient presents with    Hypertension     4 month follow up    . SUBJECTIVE:    Humaira Simental is a 78 y.o. female Comes in for return visit stating that she has been doing reasonably well. She has this vague sensation in her chest, accompanied by palpitations. She saw a cardiologist, Dr. Harmon Daily, and Event monitor was placed. Nothing was found on the Event monitor. She goes back to see her endocrinologist for a nodular goiter. She apparently has an ultrasound annually. She states that the swelling of her lower extremities has been reasonable. She does have a history of venous insufficiency. She is losing weight and I congratulate her on this. She has a past history of primary hypertension. Current Outpatient Medications   Medication Sig Dispense Refill    COLLAGEN by Does Not Apply route.  triamterene-hydroCHLOROthiazide (MAXZIDE) 37.5-25 mg per tablet TAKE 1 TABLET BY MOUTH DAILY 180 Tab 2    aspirin 81 mg chewable tablet Take 81 mg by mouth daily. Patient states she \"usually takes\" a baby aspirin daily      omega-3 fatty acids-vitamin e 1,000 mg cap Take 1 Cap by mouth.  OTHER \"tumeric\"      CALCIUM CARBONATE/VITAMIN D3 (CALCIUM + D PO) Take  by mouth daily.  multivitamin (ONE A DAY) tablet Take 1 Tab by mouth daily.  meloxicam (MOBIC) 15 mg tablet Take 1 Tab by mouth daily.  30 Tab 0     Past Medical History:   Diagnosis Date    Hypertension     Thyroid nodule     annual eval with US     Past Surgical History:   Procedure Laterality Date    HX COLONOSCOPY       No Known Allergies      REVIEW OF SYSTEMS:  General: negative for - chills or fever  ENT: negative for - headaches, nasal congestion or tinnitus  Respiratory: negative for - cough, hemoptysis, shortness of breath or wheezing  Cardiovascular Cecilia Keys negative for - chest pain, edema, palpitations or shortness of breath  Gastrointestinal: negative for - abdominal pain, blood in stools, heartburn or nausea/vomiting  Genito-Urinary: no dysuria, trouble voiding, or hematuria  Musculoskeletal: negative for - gait disturbance, joint pain, joint stiffness or joint swelling  Neurological: no TIA or stroke symptoms  Hematologic: no bruises, no bleeding, no swollen glands  Integument: no lumps, mole changes, nail changes or rash  Endocrine: no malaise/lethargy or unexpected weight changes      Social History     Socioeconomic History    Marital status:      Spouse name: Not on file    Number of children: 1    Years of education: 16+    Highest education level: Bachelor's degree (e.g., BA, AB, BS)   Occupational History    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   last year from pulmonary fibrosis.     Daughter lives in [facility]----has 2 children--- daughter and  are both      Family History   Problem Relation Age of Onset    Cancer Mother         Colon CA    Hypertension Mother     Heart Disease Father         heart attack age 52, smoker    Hypertension Father     Cancer Sister         Colon CA    Hypertension Sister     Cancer Brother         Esophageal    Hypertension Brother     Cancer Brother         Multiple myeloma    Hypertension Brother     Heart Disease Brother     Hypertension Brother     Hypertension Brother        OBJECTIVE:    Visit Vitals  /72   Pulse 95   Temp 98.5 °F (36.9 °C) (Oral)   Resp 16   Ht 5' 4\" (1.626 m)   Wt 196 lb (88.9 kg)   SpO2 97%   BMI 33.64 kg/m²     CONSTITUTIONAL: well , well nourished, appears age appropriate  EYES: perrla, eom intact  ENMT:moist mucous membranes, pharynx clear  NECK: supple. Thyroid normal, no JVD  RESPIRATORY: Chest: clear to ascultation and percussion   CARDIOVASCULAR: Heart: regular rate and rhythm  GASTROINTESTINAL: Abdomen: soft, bowel sounds active  HEMATOLOGIC: no pathological lymph nodes palpated  MUSCULOSKELETAL: Extremities: trace edema distally, pulse 1+, prominent right sternoclavicular joint  INTEGUMENT: No unusual rashes or suspicious skin lesions noted. Nails appear normal.  NEUROLOGIC: non-focal exam   MENTAL STATUS: alert and oriented, appropriate affect      ASSESSMENT:  1. Essential hypertension    2. Multinodular non-toxic goiter    3. Palpitations    4. Class 1 obesity without serious comorbidity with body mass index (BMI) of 34.0 to 34.9 in adult, unspecified obesity type    5. Venous insufficiency    6. Primary osteoarthritis of both knees        PLAN:    1. The patient's blood pressure is excellent, no adjustments are made. 2. She will follow up with endocrinologist for a multinodular goiter. She is indeed euthyroid. 3. As relates to her palpitations, nothing more need be done. She has already seen a cardiologist with a complete workup, which has been negative. He did not suggest any further workup for now. 4. She needs to lose weight. I encouraged her to do so. This can be accomplished by eating meals, eliminating snacks and avoiding the consumption of processed carbohydrates. 5. She does have mild venous insufficiency and nothing need be done. Her legs are actually doing reasonably well currently in spite of the hot, humid weather for now. 6. She does have mild osteoarthritis involving her knees, but is not functionally limited. I encouraged her to remain as physically active as possible.     .  Orders Placed This Encounter    METABOLIC PANEL, BASIC         Follow-up and Dispositions    · Return in about 6 months (around 1/30/2021).            Urmila Lizama MD

## 2020-08-01 LAB
BUN SERPL-MCNC: 19 MG/DL (ref 8–27)
BUN/CREAT SERPL: 18 (ref 12–28)
CALCIUM SERPL-MCNC: 9.5 MG/DL (ref 8.7–10.3)
CHLORIDE SERPL-SCNC: 101 MMOL/L (ref 96–106)
CO2 SERPL-SCNC: 25 MMOL/L (ref 20–29)
CREAT SERPL-MCNC: 1.05 MG/DL (ref 0.57–1)
GLUCOSE SERPL-MCNC: 83 MG/DL (ref 65–99)
POTASSIUM SERPL-SCNC: 4.1 MMOL/L (ref 3.5–5.2)
SODIUM SERPL-SCNC: 142 MMOL/L (ref 134–144)

## 2021-01-26 ENCOUNTER — VIRTUAL VISIT (OUTPATIENT)
Dept: INTERNAL MEDICINE CLINIC | Age: 71
End: 2021-01-26
Payer: MEDICARE

## 2021-01-26 DIAGNOSIS — E04.2 MULTINODULAR NON-TOXIC GOITER: ICD-10-CM

## 2021-01-26 DIAGNOSIS — I10 ESSENTIAL HYPERTENSION: ICD-10-CM

## 2021-01-26 DIAGNOSIS — R00.2 PALPITATIONS: Primary | ICD-10-CM

## 2021-01-26 DIAGNOSIS — Z00.00 WELLNESS EXAMINATION: ICD-10-CM

## 2021-01-26 DIAGNOSIS — Z13.31 SCREENING FOR DEPRESSION: ICD-10-CM

## 2021-01-26 DIAGNOSIS — F41.9 ANXIETY: ICD-10-CM

## 2021-01-26 PROCEDURE — 1090F PRES/ABSN URINE INCON ASSESS: CPT | Performed by: INTERNAL MEDICINE

## 2021-01-26 PROCEDURE — G8432 DEP SCR NOT DOC, RNG: HCPCS | Performed by: INTERNAL MEDICINE

## 2021-01-26 PROCEDURE — 1101F PT FALLS ASSESS-DOCD LE1/YR: CPT | Performed by: INTERNAL MEDICINE

## 2021-01-26 PROCEDURE — 99214 OFFICE O/P EST MOD 30 MIN: CPT | Performed by: INTERNAL MEDICINE

## 2021-01-26 PROCEDURE — 3017F COLORECTAL CA SCREEN DOC REV: CPT | Performed by: INTERNAL MEDICINE

## 2021-01-26 PROCEDURE — G8536 NO DOC ELDER MAL SCRN: HCPCS | Performed by: INTERNAL MEDICINE

## 2021-01-26 PROCEDURE — G8399 PT W/DXA RESULTS DOCUMENT: HCPCS | Performed by: INTERNAL MEDICINE

## 2021-01-26 PROCEDURE — G8417 CALC BMI ABV UP PARAM F/U: HCPCS | Performed by: INTERNAL MEDICINE

## 2021-01-26 PROCEDURE — G0439 PPPS, SUBSEQ VISIT: HCPCS | Performed by: INTERNAL MEDICINE

## 2021-01-26 PROCEDURE — G8756 NO BP MEASURE DOC: HCPCS | Performed by: INTERNAL MEDICINE

## 2021-01-26 PROCEDURE — G8427 DOCREV CUR MEDS BY ELIG CLIN: HCPCS | Performed by: INTERNAL MEDICINE

## 2021-01-26 NOTE — PROGRESS NOTES
Chief Complaint   Patient presents with   Eusebio Hurtado Annual Wellness Visit         1. Have you been to the ER, urgent care clinic since your last visit? Hospitalized since your last visit? No    2. Have you seen or consulted any other health care providers outside of the [address] [place] [address] since your last visit? Include any pap smears or colon screening.  No

## 2021-01-27 NOTE — PROGRESS NOTES
Jc Alfred is a 69 y.o. female who was seen by synchronous (real-time) audio-video technology on 1/26/2021 for Annual Wellness Visit        Assessment & Plan:   Diagnoses and all orders for this visit:    1. Palpitations    2. Anxiety    3. Essential hypertension    4. Multinodular non-toxic goiter      1. The patient has had palpitations. This occurred while she was visiting her daughter in [place]. She was under a great deal of stress there. She plans to see a cardiologist in the very near future. 2. She was quite anxious while she was there, which was for ten weeks. This is because of the assistance she was providing at the home, which caused a moderate amount of anxiety, and the pandemic. 3. Blood pressure historically has been within normal limits. 4. She also has a multinodular goiter and follows up with an endocrinologist.         Subjective:   Patient is seen on a televisit today because she just returned from [place] 24 hours ago. She was visiting her daughter and her grandchildren there. She provided assistance in the home, but was under a great deal of stress while there. She began having palpitations and a moderate amount of anxiety. She has a past history of primary hypertension, as well as a multinodular goiter. Prior to Admission medications    Medication Sig Start Date End Date Taking? Authorizing Provider   triamterene-hydroCHLOROthiazide (MAXZIDE) 37.5-25 mg per tablet TAKE 1 TABLET BY MOUTH EVERY DAY 11/8/20  Yes Angela Hines MD   COLLAGEN by Does Not Apply route. Yes Provider, Historical   aspirin 81 mg chewable tablet Take 81 mg by mouth daily. Patient states she \"usually takes\" a baby aspirin daily   Yes Provider, Historical   omega-3 fatty acids-vitamin e 1,000 mg cap Take 1 Cap by mouth. Yes Provider, Historical   OTHER \"tumeric\"   Yes Provider, Historical   CALCIUM CARBONATE/VITAMIN D3 (CALCIUM + D PO) Take  by mouth daily.    Yes Provider, Historical multivitamin (ONE A DAY) tablet Take 1 Tab by mouth daily. Yes Provider, Historical   meloxicam (MOBIC) 15 mg tablet Take 1 Tab by mouth daily. 5/16/20   Ana Lilia Hammond MD     Current Outpatient Medications   Medication Sig Dispense Refill    triamterene-hydroCHLOROthiazide (MAXZIDE) 37.5-25 mg per tablet TAKE 1 TABLET BY MOUTH EVERY DAY 90 Tab 3    COLLAGEN by Does Not Apply route.  aspirin 81 mg chewable tablet Take 81 mg by mouth daily. Patient states she \"usually takes\" a baby aspirin daily      omega-3 fatty acids-vitamin e 1,000 mg cap Take 1 Cap by mouth.  OTHER \"tumeric\"      CALCIUM CARBONATE/VITAMIN D3 (CALCIUM + D PO) Take  by mouth daily.  multivitamin (ONE A DAY) tablet Take 1 Tab by mouth daily.  meloxicam (MOBIC) 15 mg tablet Take 1 Tab by mouth daily. 30 Tab 0     No Known Allergies  Past Medical History:   Diagnosis Date    Hypertension     Thyroid nodule     annual eval with US     Past Surgical History:   Procedure Laterality Date    HX COLONOSCOPY       Family History   Problem Relation Age of Onset    Cancer Mother         Colon CA    Hypertension Mother     Heart Disease Father         heart attack age 52, smoker    Hypertension Father     Cancer Sister         Colon CA    Hypertension Sister     Cancer Brother         Esophageal    Hypertension Brother     Cancer Brother         Multiple myeloma    Hypertension Brother     Heart Disease Brother     Hypertension Brother     Hypertension Brother        ROS    Objective:   No flowsheet data found.      [INSTRUCTIONS:  \"[x]\" Indicates a positive item  \"[]\" Indicates a negative item  -- DELETE ALL ITEMS NOT EXAMINED]    Constitutional: [x] Appears well-developed and well-nourished [x] No apparent distress      [] Abnormal -     Mental status: [x] Alert and awake  [x] Oriented to person/place/time [x] Able to follow commands    [] Abnormal -     Eyes:   EOM    [x]  Normal    [] Abnormal -   Sclera  [x] Normal    [] Abnormal -          Discharge [x]  None visible   [] Abnormal -     HENT: [x] Normocephalic, atraumatic  [] Abnormal -   [x] Mouth/Throat: Mucous membranes are moist    External Ears [x] Normal  [] Abnormal -    Neck: [x] No visualized mass [] Abnormal -     Pulmonary/Chest: [x] Respiratory effort normal   [x] No visualized signs of difficulty breathing or respiratory distress        [] Abnormal -      Musculoskeletal:   [x] Normal gait with no signs of ataxia         [x] Normal range of motion of neck        [] Abnormal -     Neurological:        [x] No Facial Asymmetry (Cranial nerve 7 motor function) (limited exam due to video visit)          [x] No gaze palsy        [] Abnormal -          Skin:        [x] No significant exanthematous lesions or discoloration noted on facial skin         [] Abnormal -            Psychiatric:       [x] Normal Affect [] Abnormal -        [x] No Hallucinations    Other pertinent observable physical exam findings:-        We discussed the expected course, resolution and complications of the diagnosis(es) in detail. Medication risks, benefits, costs, interactions, and alternatives were discussed as indicated. I advised her to contact the office if her condition worsens, changes or fails to improve as anticipated. She expressed understanding with the diagnosis(es) and plan. Oscar Palmer, who was evaluated through a patient-initiated, synchronous (real-time) audio-video encounter, and/or her healthcare decision maker, is aware that it is a billable service, with coverage as determined by her insurance carrier. She provided verbal consent to proceed: Yes, and patient identification was verified. It was conducted pursuant to the emergency declaration under the [address] [place] [address], [address] [place] [address] and the [facility] and [employer] General Act. A caregiver was present when appropriate.  Ability to conduct physical exam was limited. I was at home. The patient was at home. Please note that this dictation was completed with [employer], the computer voice recognition software. Quite often unanticipated grammatical, syntax, homophones, and other interpretive errors are inadvertently transcribed by the computer software. Please disregard these errors. Please excuse any errors that have escaped final proofreading. Thank you. Nilson Thrasher MD  This is the Subsequent Medicare Annual Wellness Exam, performed 12 months or more after the Initial AWV or the last Subsequent AWV    I have reviewed the patient's medical history in detail and updated the computerized patient record. Depression Risk Factor Screening:     3 most recent PHQ Screens 7/30/2020   Little interest or pleasure in doing things Not at all   Feeling down, depressed, irritable, or hopeless Not at all   Total Score PHQ 2 0       Alcohol Risk Screen    Do you average more than 1 drink per night or more than 7 drinks a week:  No    On any one occasion in the past three months have you have had more than 3 drinks containing alcohol:  No        Functional Ability and Level of Safety:    Hearing: Hearing is good. Activities of Daily Living: The home contains: no safety equipment. Patient does total self care      Ambulation: with no difficulty     Fall Risk:  Fall Risk Assessment, last 12 mths 7/30/2020   Able to walk? Yes   Fall in past 12 months? No      Abuse Screen:  Patient is not abused       Cognitive Screening    Has your family/caregiver stated any concerns about your memory: no    Cognitive Screening: Not applicable    Assessment/Plan   Education and counseling provided:  Are appropriate based on today's review and evaluation    Diagnoses and all orders for this visit:    1. Palpitations    2. Anxiety    3. Essential hypertension    4.  Multinodular non-toxic goiter        Health Maintenance Due     Health Maintenance Due   Topic Date Due    COVID-19 Vaccine (1 of 2) 08/02/1966    GLAUCOMA SCREENING Q2Y  08/22/2019    Flu Vaccine (1) 09/01/2020    Breast Cancer Screen Mammogram  10/01/2020       Patient Care Team   Patient Care Team:  Sandrine Sofia MD as PCP - General (Internal Medicine)  Sandrine Sof[facility] MD as PCP [facility]  Briana Islas MD (Cardiology)    History     Patient Active Problem List   Diagnosis Code    Hypertension I10    Multinodular non-toxic goiter E04.2    Obesity E66.9    Change in bowel habits R19.4    Left atrial enlargement I51.7    Palpitations R00.2    Pain in both lower extremities M79.604, M79.605    Family history of colon cancer Z80.0    Primary osteoarthritis M19.91    Venous insufficiency I87.2     Past Medical History:   Diagnosis Date    Hypertension     Thyroid nodule     annual eval with US      Past Surgical History:   Procedure Laterality Date    HX COLONOSCOPY       Current Outpatient Medications   Medication Sig Dispense Refill    triamterene-hydroCHLOROthiazide (MAXZIDE) 37.5-25 mg per tablet TAKE 1 TABLET BY MOUTH EVERY DAY 90 Tab 3    COLLAGEN by Does Not Apply route.  aspirin 81 mg chewable tablet Take 81 mg by mouth daily. Patient states she \"usually takes\" a baby aspirin daily      omega-3 fatty acids-vitamin e 1,000 mg cap Take 1 Cap by mouth.  OTHER \"tumeric\"      CALCIUM CARBONATE/VITAMIN D3 (CALCIUM + D PO) Take  by mouth daily.  multivitamin (ONE A DAY) tablet Take 1 Tab by mouth daily.  meloxicam (MOBIC) 15 mg tablet Take 1 Tab by mouth daily.  27 Tab 0     No Known Allergies    Family History   Problem Relation Age of Onset    Cancer Mother         Colon CA    Hypertension Mother     Heart Disease Father         heart attack age 52, smoker    Hypertension Father     Cancer Sister         Colon CA    Hypertension Sister     Cancer Brother         Esophageal    Hypertension Brother     Cancer Brother         Multiple myeloma    Hypertension Brother     Heart Disease Brother     Hypertension Brother     Hypertension Brother      Social History     Tobacco Use    Smoking status: Never Smoker    Smokeless tobacco: Never Used   Substance Use Topics    Alcohol use: Yes     Alcohol/week: 1.7 standard drinks     Types: 2 Standard drinks or equivalent per week     Comment: pankaj Cardona, who was evaluated through a synchronous (real-time) audio-video encounter, and/or her healthcare decision maker, is aware that it is a billable service, with coverage as determined by her insurance carrier. She provided verbal consent to proceed: Yes, and patient identification was verified. It was conducted pursuant to the emergency declaration under the [address] [place] [address], [address] [place] [address] authority and the [employer] and [employer] General Act. A caregiver was present when appropriate. Ability to conduct physical exam was limited. I was at home. The patient was at home. Please note that this dictation was completed with [employer], the computer voice recognition software. Quite often unanticipated grammatical, syntax, homophones, and other interpretive errors are inadvertently transcribed by the computer software. Please disregard these errors. Please excuse any errors that have escaped final proofreading. Thank you.     Olga Palmer MD

## 2021-01-27 NOTE — PATIENT INSTRUCTIONS
Medicare Wellness Visit, Female  
 
The best way to live healthy is to have a lifestyle where you eat a well-balanced diet, exercise regularly, limit alcohol use, and quit all forms of tobacco/nicotine, if applicable. 
  
Regular preventive services are another way to keep healthy. Preventive services (vaccines, screening tests, monitoring & exams) can help personalize your care plan, which helps you manage your own care. Screening tests can find health problems at the earliest stages, when they are easiest to treat.  
[facility] follows the current, evidence-based guidelines published by the [place] Preventive Services Task Force (USPSTF) when recommending preventive services for our patients. Because we follow these guidelines, sometimes recommendations change over time as research supports it. (For example, mammograms used to be recommended annually. Even though Medicare will still pay for an annual mammogram, the newer guidelines recommend a mammogram every two years for women of average risk).  Of course, you and your doctor may decide to screen more often for some diseases, based on your risk and your co-morbidities (chronic disease you are already diagnosed with).  
 
Preventive services for you include: 
- Medicare offers their members a free annual wellness visit, which is time for you and your primary care provider to discuss and plan for your preventive service needs. Take advantage of this benefit every year! 
-All adults over the age of 65 should receive the recommended pneumonia vaccines. Current USPSTF guidelines recommend a series of two vaccines for the best pneumonia protection.  
-All adults should have a flu vaccine yearly and a tetanus vaccine every 10 years.  
-All adults age 50 and older should receive the shingles vaccines (series of two vaccines).     
-All adults age 40-70 who are overweight should have a diabetes screening test once every three years.  
 -All adults born between [birth year] and 1966 should be screened once for Hepatitis C. 
-Other screening tests and preventive services for persons with diabetes include: an eye exam to screen for diabetic retinopathy, a kidney function test, a foot exam, and stricter control over your cholesterol.  
-Cardiovascular screening for adults with routine risk involves an electrocardiogram (ECG) at intervals determined by your doctor.  
-Colorectal cancer screenings should be done for adults age 54-65 with no increased risk factors for colorectal cancer. There are a number of acceptable methods of screening for this type of cancer. Each test has its own benefits and drawbacks. Discuss with your doctor what is most appropriate for you during your annual wellness visit. The different tests include: colonoscopy (considered the best screening method), a fecal occult blood test, a fecal DNA test, and sigmoidoscopy. 
 
-A bone mass density test is recommended when a woman turns 65 to screen for osteoporosis. This test is only recommended one time, as a screening. Some providers will use this same test as a disease monitoring tool if you already have osteoporosis. -Breast cancer screenings are recommended every other year for women of normal risk, age 54-69. 
-Cervical cancer screenings for women over age 72 are only recommended with certain risk factors. Here is a list of your current Health Maintenance items (your personalized list of preventive services) with a due date: 
Health Maintenance Due Topic Date Due  
 COVID-19 Vaccine (1 of 2) 08/02/1966  Glaucoma Screening   08/22/2019  Yearly Flu Vaccine (1) 09/01/2020  Mammogram  10/01/2020

## 2021-02-05 ENCOUNTER — OFFICE VISIT (OUTPATIENT)
Dept: CARDIOLOGY CLINIC | Age: 71
End: 2021-02-05
Payer: MEDICARE

## 2021-02-05 VITALS
BODY MASS INDEX: 33.15 KG/M2 | OXYGEN SATURATION: 93 % | HEIGHT: 65 IN | RESPIRATION RATE: 18 BRPM | SYSTOLIC BLOOD PRESSURE: 122 MMHG | DIASTOLIC BLOOD PRESSURE: 80 MMHG | WEIGHT: 199 LBS | HEART RATE: 91 BPM

## 2021-02-05 DIAGNOSIS — I10 ESSENTIAL HYPERTENSION: ICD-10-CM

## 2021-02-05 DIAGNOSIS — I49.3 PVC (PREMATURE VENTRICULAR CONTRACTION): ICD-10-CM

## 2021-02-05 DIAGNOSIS — Z82.49 FAMILY HISTORY OF PREMATURE CAD: ICD-10-CM

## 2021-02-05 DIAGNOSIS — R00.2 PALPITATIONS: Primary | ICD-10-CM

## 2021-02-05 PROCEDURE — G8752 SYS BP LESS 140: HCPCS | Performed by: SPECIALIST

## 2021-02-05 PROCEDURE — G8510 SCR DEP NEG, NO PLAN REQD: HCPCS | Performed by: SPECIALIST

## 2021-02-05 PROCEDURE — G8536 NO DOC ELDER MAL SCRN: HCPCS | Performed by: SPECIALIST

## 2021-02-05 PROCEDURE — 3017F COLORECTAL CA SCREEN DOC REV: CPT | Performed by: SPECIALIST

## 2021-02-05 PROCEDURE — 99213 OFFICE O/P EST LOW 20 MIN: CPT | Performed by: SPECIALIST

## 2021-02-05 PROCEDURE — G8754 DIAS BP LESS 90: HCPCS | Performed by: SPECIALIST

## 2021-02-05 PROCEDURE — 1090F PRES/ABSN URINE INCON ASSESS: CPT | Performed by: SPECIALIST

## 2021-02-05 PROCEDURE — G8417 CALC BMI ABV UP PARAM F/U: HCPCS | Performed by: SPECIALIST

## 2021-02-05 PROCEDURE — G8427 DOCREV CUR MEDS BY ELIG CLIN: HCPCS | Performed by: SPECIALIST

## 2021-02-05 PROCEDURE — 1101F PT FALLS ASSESS-DOCD LE1/YR: CPT | Performed by: SPECIALIST

## 2021-02-05 PROCEDURE — G8399 PT W/DXA RESULTS DOCUMENT: HCPCS | Performed by: SPECIALIST

## 2021-02-05 NOTE — PROGRESS NOTES
HISTORY OF PRESENT ILLNESS  Moe Hendrix is a 69 y.o. female     SUMMARY:   Problem List  Date Reviewed: 2/5/2021          Codes Class Noted    Venous insufficiency ICD-10-CM: I87.2  ICD-9-CM: 459.81  7/30/2020        Pain in both lower extremities ICD-10-CM: M79.604, M79.605  ICD-9-CM: 729.5  9/26/2019        Family history of colon cancer ICD-10-CM: Z80.0  ICD-9-CM: V16.0  9/26/2019        Primary osteoarthritis ICD-10-CM: M19.91  ICD-9-CM: 715.10  9/26/2019        Palpitations ICD-10-CM: R00.2  ICD-9-CM: 785.1  6/4/2018    Overview Signed 6/4/2018  9:02 AM by April Truong MD     3/18 echo normal lvef, mild mr and tr without pul htn  3/18 event monitor, nsr             Left atrial enlargement ICD-10-CM: I51.7  ICD-9-CM: 429.3  2/27/2018        Change in bowel habits ICD-10-CM: R19.4  ICD-9-CM: 787.99  3/30/2016        Obesity ICD-10-CM: E66.9  ICD-9-CM: 278.00  6/15/2015        Hypertension ICD-10-CM: I10  ICD-9-CM: 401.9  12/2/2014        Multinodular non-toxic goiter ICD-10-CM: E04.2  ICD-9-CM: 241.1  12/2/2014              Current Outpatient Medications on File Prior to Visit   Medication Sig    triamterene-hydroCHLOROthiazide (MAXZIDE) 37.5-25 mg per tablet TAKE 1 TABLET BY MOUTH EVERY DAY    COLLAGEN by Does Not Apply route.  aspirin 81 mg chewable tablet Take 81 mg by mouth daily. Patient states she \"usually takes\" a baby aspirin daily    omega-3 fatty acids-vitamin e 1,000 mg cap Take 1 Cap by mouth.  OTHER \"tumeric\"    CALCIUM CARBONATE/VITAMIN D3 (CALCIUM + D PO) Take  by mouth daily.  multivitamin (ONE A DAY) tablet Take 1 Tab by mouth daily. No current facility-administered medications on file prior to visit. CARDIOLOGY STUDIES TO DATE:  3/18 echo normal lvef, mild mr and tr without pul htn  3/18 event monitor, nsr  6/20 rare pacs inconsistent associated with flutter, skipped beats.  No sig tachy or roberto    Chief Complaint   Patient presents with    Palpitations when she lays down at night     HPI :  She recently returned from a 10week trip to [place] to be with her daughter and grandchildren. It sounds like things are very stressful at their between the pandemic and some marital problems her daughter was having and she says it was no vacation at all. While she was there she was having lots of problems with palpitations mostly at night. She is complained of this in the past.  We put a event monitor on her in June and she triggered it more than 15 times for skipped beats and fluttering however only 2 of her episodes of symptoms were associated with a single PVC. Since she is returned from [place] about 3 weeks ago she is generally felt better. She is sleeping better and she is hoping to start exercising again soon. CARDIAC ROS:   negative for chest pain, dyspnea, syncope, orthopnea, paroxysmal nocturnal dyspnea, exertional chest pressure/discomfort, claudication, lower extremity edema    Family History   Problem Relation Age of Onset    Cancer Mother         Colon CA    Hypertension Mother     Heart Disease Father         heart attack age 52, smoker    Hypertension Father     Cancer Sister         Colon CA    Hypertension Sister     Cancer Brother         Esophageal    Hypertension Brother     Cancer Brother         Multiple myeloma    Hypertension Brother     Heart Disease Brother     Hypertension Brother     Hypertension Brother        Past Medical History:   Diagnosis Date    Hypertension     Thyroid nodule     annual eval with US       GENERAL ROS:  A comprehensive review of systems was negative except for that written in the HPI.     Visit Vitals  /80   Pulse 91   Resp 18   Ht 5' 5\" (1.651 m)   Wt 199 lb (90.3 kg)   SpO2 93%   BMI 33.12 kg/m²       Wt Readings from Last 3 Encounters:   02/05/21 199 lb (90.3 kg)   07/30/20 196 lb (88.9 kg)   07/13/20 195 lb 6.4 oz (88.6 kg)            BP Readings from Last 3 Encounters: 02/05/21 122/80   07/30/20 122/72   07/13/20 122/82       PHYSICAL EXAM  General appearance: alert, cooperative, no distress, appears stated age  Neurologic: Alert and oriented X 3  Neck: supple, symmetrical, trachea midline, no adenopathy, no carotid bruit and no JVD  Lungs: clear to auscultation bilaterally  Extremities: extremities normal, atraumatic, no cyanosis or edema    Lab Results   Component Value Date/Time    Cholesterol, total 186 09/26/2019 11:39 AM    Cholesterol, total 184 11/05/2018 09:30 AM    Cholesterol, total 155 02/27/2018 10:30 AM    Cholesterol, total 168 09/14/2017 10:27 AM    Cholesterol, total 181 03/13/2017 09:19 AM    HDL Cholesterol 87 09/26/2019 11:39 AM    HDL Cholesterol 78 11/05/2018 09:30 AM    HDL Cholesterol 78 02/27/2018 10:30 AM    HDL Cholesterol 74 09/14/2017 10:27 AM    HDL Cholesterol 76 03/13/2017 09:19 AM    LDL, calculated 87 09/26/2019 11:39 AM    LDL, calculated 93 11/05/2018 09:30 AM    LDL, calculated 62 02/27/2018 10:30 AM    LDL, calculated 80 09/14/2017 10:27 AM    LDL, calculated 87 03/13/2017 09:19 AM    Triglyceride 61 09/26/2019 11:39 AM    Triglyceride 67 11/05/2018 09:30 AM    Triglyceride 75 02/27/2018 10:30 AM    Triglyceride 72 09/14/2017 10:27 AM    Triglyceride 92 03/13/2017 09:19 AM    CHOL/HDL Ratio 2.6 01/11/2010 10:53 AM    CHOL/HDL Ratio 2.3 02/02/2009 09:50 AM     ASSESSMENT :      From what we found on her event monitor it seems to me that most of her symptoms are not related to her PVCs and therefore I do not see a lot of benefit in trying her on a beta-blocker so we talked about all that  in detail. Clearly stress and anxiety are major part of her symptom complex and she is aware that. She has had a number of losses in the last year and now with her daughter's difficulties with no surprise.   I reassured her that there was no evidence of any serious cardiac problems at this point and I do not think she needs any further cardiac testing at this time. current treatment plan is effective, no change in therapy  lab results and schedule of future lab studies reviewed with patient  reviewed diet, exercise and weight control    Encounter Diagnoses   Name Primary?  Palpitations Yes    PVC (premature ventricular contraction)     Essential hypertension     Family history of premature CAD      No orders of the defined types were placed in this encounter. Follow-up and Dispositions    · Return in about 6 months (around 8/5/2021). Young Rice MD  2/5/2021  Please note that this dictation was completed with [employer], the [employer] voice recognition software. Quite often unanticipated grammatical, syntax, homophones, and other interpretive errors are inadvertently transcribed by the computer software. Please disregard these errors. Please excuse any errors that have escaped final proofreading. Thank you.

## 2021-02-15 ENCOUNTER — OFFICE VISIT (OUTPATIENT)
Dept: INTERNAL MEDICINE CLINIC | Age: 71
End: 2021-02-15
Payer: MEDICARE

## 2021-02-15 DIAGNOSIS — E66.9 CLASS 1 OBESITY WITHOUT SERIOUS COMORBIDITY WITH BODY MASS INDEX (BMI) OF 34.0 TO 34.9 IN ADULT, UNSPECIFIED OBESITY TYPE: ICD-10-CM

## 2021-02-15 DIAGNOSIS — R00.2 PALPITATIONS: ICD-10-CM

## 2021-02-15 DIAGNOSIS — I10 ESSENTIAL HYPERTENSION: Primary | ICD-10-CM

## 2021-02-15 DIAGNOSIS — E04.2 MULTINODULAR NON-TOXIC GOITER: ICD-10-CM

## 2021-02-15 DIAGNOSIS — E78.5 DYSLIPIDEMIA: ICD-10-CM

## 2021-02-15 PROCEDURE — G8754 DIAS BP LESS 90: HCPCS | Performed by: INTERNAL MEDICINE

## 2021-02-15 PROCEDURE — 1101F PT FALLS ASSESS-DOCD LE1/YR: CPT | Performed by: INTERNAL MEDICINE

## 2021-02-15 PROCEDURE — G8432 DEP SCR NOT DOC, RNG: HCPCS | Performed by: INTERNAL MEDICINE

## 2021-02-15 PROCEDURE — 1090F PRES/ABSN URINE INCON ASSESS: CPT | Performed by: INTERNAL MEDICINE

## 2021-02-15 PROCEDURE — G8427 DOCREV CUR MEDS BY ELIG CLIN: HCPCS | Performed by: INTERNAL MEDICINE

## 2021-02-15 PROCEDURE — G8536 NO DOC ELDER MAL SCRN: HCPCS | Performed by: INTERNAL MEDICINE

## 2021-02-15 PROCEDURE — 99215 OFFICE O/P EST HI 40 MIN: CPT | Performed by: INTERNAL MEDICINE

## 2021-02-15 PROCEDURE — G8399 PT W/DXA RESULTS DOCUMENT: HCPCS | Performed by: INTERNAL MEDICINE

## 2021-02-15 PROCEDURE — 3017F COLORECTAL CA SCREEN DOC REV: CPT | Performed by: INTERNAL MEDICINE

## 2021-02-15 PROCEDURE — G8753 SYS BP > OR = 140: HCPCS | Performed by: INTERNAL MEDICINE

## 2021-02-15 PROCEDURE — G8417 CALC BMI ABV UP PARAM F/U: HCPCS | Performed by: INTERNAL MEDICINE

## 2021-02-15 NOTE — PROGRESS NOTES
Chief Complaint   Patient presents with    Palpitations     follow up          1. Have you been to the ER, urgent care clinic since your last visit? Hospitalized since your last visit? No    2. Have you seen or consulted any other health care providers outside of the [address] [place] [address] since your last visit? Include any pap smears or colon screening.  No

## 2021-02-17 LAB
ALBUMIN SERPL-MCNC: 3.9 G/DL (ref 3.5–5)
ALBUMIN/GLOB SERPL: 1.1 {RATIO} (ref 1.1–2.2)
ALP SERPL-CCNC: 95 U/L (ref 45–117)
ALT SERPL-CCNC: 26 U/L (ref 12–78)
ANION GAP SERPL CALC-SCNC: 5 MMOL/L (ref 5–15)
APO B SERPL-MCNC: 74 MG/DL
APPEARANCE UR: CLEAR
AST SERPL-CCNC: 16 U/L (ref 15–37)
BASOPHILS # BLD: 0 K/UL (ref 0–0.1)
BASOPHILS NFR BLD: 0 % (ref 0–1)
BILIRUB SERPL-MCNC: 0.3 MG/DL (ref 0.2–1)
BILIRUB UR QL: NEGATIVE
BUN SERPL-MCNC: 22 MG/DL (ref 6–20)
BUN/CREAT SERPL: 24 (ref 12–20)
CALCIUM SERPL-MCNC: 9.8 MG/DL (ref 8.5–10.1)
CHLORIDE SERPL-SCNC: 103 MMOL/L (ref 97–108)
CHOLEST SERPL-MCNC: 191 MG/DL
CO2 SERPL-SCNC: 30 MMOL/L (ref 21–32)
COLOR UR: NORMAL
CREAT SERPL-MCNC: 0.93 MG/DL (ref 0.55–1.02)
CRP SERPL HS-MCNC: >9.5 MG/L
DIFFERENTIAL METHOD BLD: ABNORMAL
EOSINOPHIL # BLD: 0.1 K/UL (ref 0–0.4)
EOSINOPHIL NFR BLD: 1 % (ref 0–7)
ERYTHROCYTE [DISTWIDTH] IN BLOOD BY AUTOMATED COUNT: 13.6 % (ref 11.5–14.5)
GLOBULIN SER CALC-MCNC: 3.5 G/DL (ref 2–4)
GLUCOSE SERPL-MCNC: 85 MG/DL (ref 65–100)
GLUCOSE UR STRIP.AUTO-MCNC: NEGATIVE MG/DL
HCT VFR BLD AUTO: 40.3 % (ref 35–47)
HDLC SERPL-MCNC: 93 MG/DL
HDLC SERPL: 2.1 {RATIO} (ref 0–5)
HGB BLD-MCNC: 12 G/DL (ref 11.5–16)
HGB UR QL STRIP: NEGATIVE
IMM GRANULOCYTES # BLD AUTO: 0 K/UL (ref 0–0.04)
IMM GRANULOCYTES NFR BLD AUTO: 0 % (ref 0–0.5)
KETONES UR QL STRIP.AUTO: NEGATIVE MG/DL
LDLC SERPL CALC-MCNC: 82 MG/DL (ref 0–100)
LEUKOCYTE ESTERASE UR QL STRIP.AUTO: NEGATIVE
LIPID PROFILE,FLP: NORMAL
LYMPHOCYTES # BLD: 1.7 K/UL (ref 0.8–3.5)
LYMPHOCYTES NFR BLD: 31 % (ref 12–49)
MCH RBC QN AUTO: 26.6 PG (ref 26–34)
MCHC RBC AUTO-ENTMCNC: 29.8 G/DL (ref 30–36.5)
MCV RBC AUTO: 89.4 FL (ref 80–99)
MONOCYTES # BLD: 0.5 K/UL (ref 0–1)
MONOCYTES NFR BLD: 9 % (ref 5–13)
NEUTS SEG # BLD: 3.3 K/UL (ref 1.8–8)
NEUTS SEG NFR BLD: 59 % (ref 32–75)
NITRITE UR QL STRIP.AUTO: NEGATIVE
NRBC # BLD: 0 K/UL (ref 0–0.01)
NRBC BLD-RTO: 0 PER 100 WBC
PH UR STRIP: 7 [PH] (ref 5–8)
PLATELET # BLD AUTO: 210 K/UL (ref 150–400)
PMV BLD AUTO: 11.4 FL (ref 8.9–12.9)
POTASSIUM SERPL-SCNC: 4.4 MMOL/L (ref 3.5–5.1)
PROT SERPL-MCNC: 7.4 G/DL (ref 6.4–8.2)
PROT UR STRIP-MCNC: NEGATIVE MG/DL
RBC # BLD AUTO: 4.51 M/UL (ref 3.8–5.2)
SODIUM SERPL-SCNC: 138 MMOL/L (ref 136–145)
SP GR UR REFRACTOMETRY: 1.01 (ref 1–1.03)
TRIGL SERPL-MCNC: 80 MG/DL (ref ?–150)
TSH SERPL DL<=0.05 MIU/L-ACNC: 0.86 UIU/ML (ref 0.36–3.74)
UROBILINOGEN UR QL STRIP.AUTO: 0.2 EU/DL (ref 0.2–1)
VLDLC SERPL CALC-MCNC: 16 MG/DL
WBC # BLD AUTO: 5.5 K/UL (ref 3.6–11)

## 2021-02-21 VITALS
BODY MASS INDEX: 33.64 KG/M2 | RESPIRATION RATE: 16 BRPM | TEMPERATURE: 97.9 F | OXYGEN SATURATION: 98 % | HEIGHT: 65 IN | WEIGHT: 201.9 LBS | SYSTOLIC BLOOD PRESSURE: 130 MMHG | DIASTOLIC BLOOD PRESSURE: 80 MMHG | HEART RATE: 88 BPM

## 2021-02-21 NOTE — PROGRESS NOTES
[address] [facility]  [place] [address]  [address] [facility] [address]  Phone:  [phone]  Fax: [phone]       Chief Complaint   Patient presents with    Palpitations     follow up    . SUBJECTIVE:    Zoya Bush is a 69 y.o. female comes in for return visit stating that she has done reasonably well. She spent almost three months with her daughter in [place] and her children. She had her PPE in place, but was not protected with the vaccine. It was quite daunting the time that she spent there. She is seeing her endocrinologist for her goiter, which was apparently biopsied most recently and was predictably negative. She had her mammograms in October. Her colonoscopy is scheduled for March after five years in view of her family history of colon cancer. She will be seeing her ophthalmologist in the near future. She also developed palpitations. She went to her cardiologist and really nothing was done, and she was somewhat disappointed, but I explained to her that after working her up most recently, he is not going to just keep repeating the workup unless he definitively found something on his examination. She has a past history of primary hypertension as well as obesity. Current Outpatient Medications   Medication Sig Dispense Refill    triamterene-hydroCHLOROthiazide (MAXZIDE) 37.5-25 mg per tablet TAKE 1 TABLET BY MOUTH EVERY DAY 90 Tab 3    COLLAGEN by Does Not Apply route.  aspirin 81 mg chewable tablet Take 81 mg by mouth daily. Patient states she \"usually takes\" a baby aspirin daily      omega-3 fatty acids-vitamin e 1,000 mg cap Take 1 Cap by mouth.  OTHER \"tumeric\"      CALCIUM CARBONATE/VITAMIN D3 (CALCIUM + D PO) Take  by mouth daily.  multivitamin (ONE A DAY) tablet Take 1 Tab by mouth daily.        Past Medical History:   Diagnosis Date    Hypertension     Thyroid nodule     annual eval with US     Past Surgical History:   Procedure Laterality Date    HX COLONOSCOPY       No Known Allergies      REVIEW OF SYSTEMS:  General: negative for - chills or fever  ENT: negative for - headaches, nasal congestion or tinnitus  Respiratory: negative for - cough, hemoptysis, shortness of breath or wheezing  Cardiovascular : negative for - chest pain, edema, palpitations or shortness of breath  Gastrointestinal: negative for - abdominal pain, blood in stools, heartburn or nausea/vomiting  Genito-Urinary: no dysuria, trouble voiding, or hematuria  Musculoskeletal: negative for - gait disturbance, joint pain, joint stiffness or joint swelling  Neurological: no TIA or stroke symptoms  Hematologic: no bruises, no bleeding, no swollen glands  Integument: no lumps, mole changes, nail changes or rash  Endocrine: no malaise/lethargy or unexpected weight changes      Social History     Socioeconomic History    Marital status:      Spouse name: Not on file    Number of children: 1    Years of education: 16+    Highest education level: Bachelor's degree (e.g., BA, AB, BS)   Occupational History    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   last year from pulmonary fibrosis.     Daughter lives in [facility]----has 2 children--- daughter and  are both      Family History   Problem Relation Age of Onset    Cancer Mother         Colon CA    Hypertension Mother     Heart Disease Father         heart attack age 52, smoker    Hypertension Father     Cancer Sister         Colon CA    Hypertension Sister     Cancer Brother         Esophageal    Hypertension Brother     Cancer Brother         Multiple myeloma    Hypertension Brother     Heart Disease Brother     Hypertension Brother     Hypertension Brother        OBJECTIVE:    Visit Vitals  /80   Pulse 88   Temp 97.9 °F (36.6 °C) (Oral)   Resp 16   Ht 5' 5\" (1.651 m)   Wt 201 lb 14.4 oz (91.6 kg)   SpO2 98%   BMI 33.60 kg/m²     CONSTITUTIONAL: well , well nourished, appears age appropriate  EYES: perrla, eom intact  ENMT:moist mucous membranes, pharynx clear  NECK: supple. Thyroid normal  RESPIRATORY: Chest: clear to ascultation and percussion   CARDIOVASCULAR: Heart: regular rate and rhythm  GASTROINTESTINAL: Abdomen: soft, bowel sounds active  HEMATOLOGIC: no pathological lymph nodes palpated  MUSCULOSKELETAL: Extremities: no edema, pulse 1+   INTEGUMENT: No unusual rashes or suspicious skin lesions noted. Nails appear normal.  NEUROLOGIC: non-focal exam   MENTAL STATUS: alert and oriented, appropriate affect      ASSESSMENT:  1. Essential hypertension    2. Multinodular non-toxic goiter    3. Class 1 obesity without serious comorbidity with body mass index (BMI) of 34.0 to 34.9 in adult, unspecified obesity type    4. Palpitations    5. Dyslipidemia        PLAN:  1. Blood pressure is acceptable today. No adjustments are made. 2. She will follow up with her endocrinologist for multinodular goiter. 3. I encouraged weight reduction. This can be accomplished by eating meals, eliminating snacks, and avoiding the consumption of processed carbohydrates. 4. As far as her palpitations are concerned, I reassured her that she need not worry about anything at this point. If new symptoms develop, that is a different story and she will have to be evaluated again, but at this point, it is quite possible that the so-called palpitations she has had is not even cardiac in origin.   She admits that she has been under regular stress lately and so, this is the most likely cause.  5. Lipid values will be checked in view of her mild-to-moderate risk for cardiovascular disease. .  Orders Placed This Encounter    APOLIPOPROTEIN B    CBC WITH AUTOMATED DIFF    LIPID PANEL    METABOLIC PANEL, COMPREHENSIVE    TSH 3RD GENERATION    URINALYSIS W/ RFLX MICROSCOPIC    CRP, HIGH SENSITIVITY         Follow-up and Dispositions    · Return in about 4 months (around 6/15/2021).            Floyd Roe MD

## 2021-03-25 ENCOUNTER — VIRTUAL VISIT (OUTPATIENT)
Dept: INTERNAL MEDICINE CLINIC | Age: 71
End: 2021-03-25
Payer: MEDICARE

## 2021-03-25 DIAGNOSIS — R60.0 EDEMA OF RIGHT FOOT: Primary | ICD-10-CM

## 2021-03-25 DIAGNOSIS — Z98.890 HX OF COLONOSCOPY: ICD-10-CM

## 2021-03-25 DIAGNOSIS — I10 ESSENTIAL HYPERTENSION: ICD-10-CM

## 2021-03-25 DIAGNOSIS — I87.2 VENOUS INSUFFICIENCY: ICD-10-CM

## 2021-03-25 DIAGNOSIS — M48.062 SPINAL STENOSIS OF LUMBAR REGION WITH NEUROGENIC CLAUDICATION: ICD-10-CM

## 2021-03-25 PROCEDURE — 3017F COLORECTAL CA SCREEN DOC REV: CPT | Performed by: INTERNAL MEDICINE

## 2021-03-25 PROCEDURE — 1101F PT FALLS ASSESS-DOCD LE1/YR: CPT | Performed by: INTERNAL MEDICINE

## 2021-03-25 PROCEDURE — G8427 DOCREV CUR MEDS BY ELIG CLIN: HCPCS | Performed by: INTERNAL MEDICINE

## 2021-03-25 PROCEDURE — 1090F PRES/ABSN URINE INCON ASSESS: CPT | Performed by: INTERNAL MEDICINE

## 2021-03-25 PROCEDURE — 99214 OFFICE O/P EST MOD 30 MIN: CPT | Performed by: INTERNAL MEDICINE

## 2021-03-25 PROCEDURE — G8432 DEP SCR NOT DOC, RNG: HCPCS | Performed by: INTERNAL MEDICINE

## 2021-03-25 PROCEDURE — G8399 PT W/DXA RESULTS DOCUMENT: HCPCS | Performed by: INTERNAL MEDICINE

## 2021-03-25 PROCEDURE — G8756 NO BP MEASURE DOC: HCPCS | Performed by: INTERNAL MEDICINE

## 2021-03-25 NOTE — PROGRESS NOTES
Chief Complaint   Patient presents with    Foot Swelling     Patient states that every morning her right foot is swollen. No complaints of pain. 1. Have you been to the ER, urgent care clinic since your last visit? Hospitalized since your last visit? No    2. Have you seen or consulted any other health care providers outside of the [address] [place] [address] since your last visit? Include any pap smears or colon screening.  No         Colonoscopy: 3/22/21

## 2021-03-26 NOTE — PROGRESS NOTES
Matthew Narayan is a 69 y.o. female who was seen by synchronous (real-time) audio-video technology on 3/25/2021 for Foot Swelling (Patient states that every morning her right foot is swollen. No complaints of pain. )        Assessment & Plan:   Diagnoses and all orders for this visit:    1. Edema of right foot    2. Venous insufficiency    3. Spinal stenosis of lumbar region with neurogenic claudication    4. Essential hypertension    5. Hx of colonoscopy    1. The patient has asymmetrical edema of the right foot. This is a bit more than usual.  There is an orthostatic component with it being worse in the evening, and it improves significantly in the morning. I suspect this is related to venous insufficiency which she has a history of. She is told to use compression stockings, and this can be a pressure of 20 to 30 mmHg, to be placed in the morning and removed in the evening. This is a benign condition and does not represent any systemic problem currently. 2. She complained about low back pain radiating to her both legs with walking. Often times, she has to sit down. This probably suggests lumbar spinal stenosis. Symptomatic treatment for now. 3. Historically, her blood pressure has been doing quite well. 4. She did have a colonoscopy done most recently and this was entirely normal.        Subjective: The patient is seen on telemedicine because of unilateral swelling, involvement of right foot. There is an orthostatic component present. Historically, she does have a history of venous insufficiency. Most recently, she had a colonoscopy done which is entirely normal.    She has a past history of primary hypertension also. Prior to Admission medications    Medication Sig Start Date End Date Taking? Authorizing Provider   triamterene-hydroCHLOROthiazide (MAXZIDE) 37.5-25 mg per tablet TAKE 1 TABLET BY MOUTH EVERY DAY 11/8/20  Yes Rissa Hernandez MD   COLLAGEN by Does Not Apply route.    Yes Provider, Historical   aspirin 81 mg chewable tablet Take 81 mg by mouth daily. Patient states she \"usually takes\" a baby aspirin daily   Yes Provider, Historical   omega-3 fatty acids-vitamin e 1,000 mg cap Take 1 Cap by mouth. Yes Provider, Historical   OTHER \"tumeric\"   Yes Provider, Historical   CALCIUM CARBONATE/VITAMIN D3 (CALCIUM + D PO) Take  by mouth daily. Yes Provider, Historical   multivitamin (ONE A DAY) tablet Take 1 Tab by mouth daily. Yes Provider, Historical     Current Outpatient Medications   Medication Sig Dispense Refill    triamterene-hydroCHLOROthiazide (MAXZIDE) 37.5-25 mg per tablet TAKE 1 TABLET BY MOUTH EVERY DAY 90 Tab 3    COLLAGEN by Does Not Apply route.  aspirin 81 mg chewable tablet Take 81 mg by mouth daily. Patient states she \"usually takes\" a baby aspirin daily      omega-3 fatty acids-vitamin e 1,000 mg cap Take 1 Cap by mouth.  OTHER \"tumeric\"      CALCIUM CARBONATE/VITAMIN D3 (CALCIUM + D PO) Take  by mouth daily.  multivitamin (ONE A DAY) tablet Take 1 Tab by mouth daily. No Known Allergies  Past Medical History:   Diagnosis Date    Hypertension     Thyroid nodule     annual eval with US     Past Surgical History:   Procedure Laterality Date    HX COLONOSCOPY       Family History   Problem Relation Age of Onset    Cancer Mother         Colon CA    Hypertension Mother     Heart Disease Father         heart attack age 52, smoker    Hypertension Father     Cancer Sister         Colon CA    Hypertension Sister     Cancer Brother         Esophageal    Hypertension Brother     Cancer Brother         Multiple myeloma    Hypertension Brother     Heart Disease Brother     Hypertension Brother     Hypertension Brother        ROS: 14 point review of systems negative    Objective:   No flowsheet data found.      [INSTRUCTIONS:  \"[x]\" Indicates a positive item  \"[]\" Indicates a negative item  -- DELETE ALL ITEMS NOT EXAMINED]    Constitutional: [x] Appears well-developed and well-nourished [x] No apparent distress      [] Abnormal -     Mental status: [x] Alert and awake  [x] Oriented to person/place/time [x] Able to follow commands    [] Abnormal -     Eyes:   EOM    [x]  Normal    [] Abnormal -   Sclera  [x]  Normal    [] Abnormal -          Discharge [x]  None visible   [] Abnormal -     HENT: [x] Normocephalic, atraumatic  [] Abnormal -   [x] Mouth/Throat: Mucous membranes are moist    External Ears [x] Normal  [] Abnormal -    Neck: [x] No visualized mass [] Abnormal -     Pulmonary/Chest: [x] Respiratory effort normal   [x] No visualized signs of difficulty breathing or respiratory distress        [] Abnormal -      Musculoskeletal:   [x] Normal gait with no signs of ataxia         [x] Normal range of motion of neck        [] Abnormal -     Neurological:        [x] No Facial Asymmetry (Cranial nerve 7 motor function) (limited exam due to video visit)          [x] No gaze palsy        [] Abnormal -          Skin:        [x] No significant exanthematous lesions or discoloration noted on facial skin         [] Abnormal -            Psychiatric:       [x] Normal Affect [] Abnormal -        [x] No Hallucinations    Other pertinent observable physical exam findings:-        We discussed the expected course, resolution and complications of the diagnosis(es) in detail. Medication risks, benefits, costs, interactions, and alternatives were discussed as indicated. I advised her to contact the office if her condition worsens, changes or fails to improve as anticipated. She expressed understanding with the diagnosis(es) and plan. Jj Dietrichon, was evaluated through a synchronous (real-time) audio-video encounter. The patient (or guardian if applicable) is aware that this is a billable service. Verbal consent to proceed has been obtained within the past 12 months.  The visit was conducted pursuant to the emergency declaration under the [address] and the [employer] Act, [facility] waiver authority and the [employer]ARD and [place] General Act. Patient identification was verified, and a caregiver was present when appropriate. The patient was located in a state where the provider was credentialed to provide care.           Leah Apgar, MD

## 2021-06-15 ENCOUNTER — OFFICE VISIT (OUTPATIENT)
Dept: INTERNAL MEDICINE CLINIC | Age: 71
End: 2021-06-15
Payer: MEDICARE

## 2021-06-15 DIAGNOSIS — R53.81 PHYSICAL DECONDITIONING: ICD-10-CM

## 2021-06-15 DIAGNOSIS — G62.9 NEUROPATHY: ICD-10-CM

## 2021-06-15 DIAGNOSIS — I87.2 VENOUS INSUFFICIENCY: ICD-10-CM

## 2021-06-15 DIAGNOSIS — R79.89 PRERENAL AZOTEMIA: Primary | ICD-10-CM

## 2021-06-15 DIAGNOSIS — M54.50 CHRONIC MIDLINE LOW BACK PAIN WITHOUT SCIATICA: ICD-10-CM

## 2021-06-15 DIAGNOSIS — G56.01 CARPAL TUNNEL SYNDROME OF RIGHT WRIST: ICD-10-CM

## 2021-06-15 DIAGNOSIS — G89.29 CHRONIC MIDLINE LOW BACK PAIN WITHOUT SCIATICA: ICD-10-CM

## 2021-06-15 PROCEDURE — 99214 OFFICE O/P EST MOD 30 MIN: CPT | Performed by: INTERNAL MEDICINE

## 2021-06-15 PROCEDURE — G8399 PT W/DXA RESULTS DOCUMENT: HCPCS | Performed by: INTERNAL MEDICINE

## 2021-06-15 PROCEDURE — 3017F COLORECTAL CA SCREEN DOC REV: CPT | Performed by: INTERNAL MEDICINE

## 2021-06-15 PROCEDURE — 1090F PRES/ABSN URINE INCON ASSESS: CPT | Performed by: INTERNAL MEDICINE

## 2021-06-15 PROCEDURE — G8432 DEP SCR NOT DOC, RNG: HCPCS | Performed by: INTERNAL MEDICINE

## 2021-06-15 PROCEDURE — G8754 DIAS BP LESS 90: HCPCS | Performed by: INTERNAL MEDICINE

## 2021-06-15 PROCEDURE — G8427 DOCREV CUR MEDS BY ELIG CLIN: HCPCS | Performed by: INTERNAL MEDICINE

## 2021-06-15 PROCEDURE — 1101F PT FALLS ASSESS-DOCD LE1/YR: CPT | Performed by: INTERNAL MEDICINE

## 2021-06-15 PROCEDURE — G8417 CALC BMI ABV UP PARAM F/U: HCPCS | Performed by: INTERNAL MEDICINE

## 2021-06-15 PROCEDURE — G8536 NO DOC ELDER MAL SCRN: HCPCS | Performed by: INTERNAL MEDICINE

## 2021-06-15 PROCEDURE — G8753 SYS BP > OR = 140: HCPCS | Performed by: INTERNAL MEDICINE

## 2021-06-15 NOTE — PROGRESS NOTES
[address] [facility]  [place] [address]  [address] [facility] [address]  Phone:  [phone]  Fax: [phone]       Chief Complaint   Patient presents with    Hypertension     4 month follow up    . SUBJECTIVE:    Lala Talamantes is a 69 y.o. female comes in for a return visit with several complaints. She complains of low back pain, typically occurring in the morning and gradually is abated as the day progresses. She also complains of a pain in the lower extremities. This is transient. She has been noting intermittent swelling of the lower extremities, which has been a problem noted seasonally, typically in the summer months. She has been using compression stockings. She has not been physically active currently. She has been taking her antihypertensive medication as prescribed. She has not seen any new physicians of late either. Current Outpatient Medications   Medication Sig Dispense Refill    triamterene-hydroCHLOROthiazide (MAXZIDE) 37.5-25 mg per tablet TAKE 1 TABLET BY MOUTH EVERY DAY 90 Tab 3    COLLAGEN by Does Not Apply route.  aspirin 81 mg chewable tablet Take 81 mg by mouth daily. Patient states she \"usually takes\" a baby aspirin daily      omega-3 fatty acids-vitamin e 1,000 mg cap Take 1 Cap by mouth.  OTHER \"tumeric\"      CALCIUM CARBONATE/VITAMIN D3 (CALCIUM + D PO) Take  by mouth daily.  multivitamin (ONE A DAY) tablet Take 1 Tab by mouth daily.        Past Medical History:   Diagnosis Date    Hypertension     Thyroid nodule     annual eval with US     Past Surgical History:   Procedure Laterality Date    HX COLONOSCOPY       No Known Allergies      REVIEW OF SYSTEMS:  General: negative for - chills or fever  ENT: negative for - headaches, nasal congestion or tinnitus  Respiratory: negative for - cough, hemoptysis, shortness of breath or wheezing  Cardiovascular : negative for - chest pain, edema, palpitations or shortness of breath  Gastrointestinal: negative for - abdominal pain, blood in stools, heartburn or nausea/vomiting  Genito-Urinary: no dysuria, trouble voiding, or hematuria  Musculoskeletal: negative for - gait disturbance, joint pain, joint stiffness or joint swelling  Neurological: no TIA or stroke symptoms  Hematologic: no bruises, no bleeding, no swollen glands  Integument: no lumps, mole changes, nail changes or rash  Endocrine: no malaise/lethargy or unexpected weight changes      Social History     Socioeconomic History    Marital status:      Spouse name: Not on file    Number of children: 1    Years of education: 16+    Highest education level: Bachelor's degree (e.g., BA, AB, BS)   Occupational History    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   last year from pulmonary fibrosis. Daughter lives in [facility]----has 2 children--- daughter and  are both      Social Determinants of Health     Financial Resource Strain:     Difficulty of Paying Living Expenses:    Food Insecurity:     Worried About [address] in the Last Year:     [address] in the Last Year:    Transportation Needs:     Lack of Transportation (Medical):      Lack of Transportation (Non-Medical):    Physical Activity:     Days of Exercise per Week:     Minutes of Exercise per Session:    Stress:     Feeling of Stress :    Social Connections:     Frequency of Communication with Friends and Family:     Frequency of Social Gatherings with Friends and Family:     Attends Mos[employer] Services:     Active Member of Clubs or Organizations:     Attends Club or Organization Meetings:     Marital Status:      Family History   Problem Relation Age of Onset    Cancer Mother         Colon CA    Hypertension Mother     Heart Disease Father         heart attack age 52, smoker    Hypertension Father     Cancer Sister         Colon CA    Hypertension Sister     Cancer Brother         Esophageal    Hypertension Brother     Cancer Brother         Multiple myeloma    Hypertension Brother     Heart Disease Brother     Hypertension Brother     Hypertension Brother        OBJECTIVE:    Visit Vitals  /70   Pulse 73   Temp 98.3 °F (36.8 °C) (Oral)   Resp 16   Ht 5' 5\" (1.651 m)   Wt 197 lb 8 oz (89.6 kg)   SpO2 100%   BMI 32.87 kg/m²     CONSTITUTIONAL: well , well nourished, appears age appropriate  EYES: perrla, eom intact  ENMT:moist mucous membranes, pharynx clear  NECK: supple. Thyroid normal  RESPIRATORY: Chest: clear to ascultation and percussion   CARDIOVASCULAR: Heart: regular rate and rhythm  GASTROINTESTINAL: Abdomen: soft, bowel sounds active  HEMATOLOGIC: no pathological lymph nodes palpated  MUSCULOSKELETAL: Extremities: no edema, pulse 1+   INTEGUMENT: No unusual rashes or suspicious skin lesions noted. Nails appear normal.  NEUROLOGIC: non-focal exam   MENTAL STATUS: alert and oriented, appropriate affect      ASSESSMENT:  1. Prerenal azotemia    2. Venous insufficiency    3. Carpal tunnel syndrome of right wrist    4. Neuropathy    5. Chronic midline low back pain without sciatica    6. Physical deconditioning        PLAN:  1. The patient is probably mildly depressed and feels that she has become more active by getting out and visiting people. Her daughter will be visiting her with her children in the next several weeks. 2. The swelling of the lower extremities is related to venous insufficiency. This is a chronic ongoing problem.   Treatment of choice is compression stocking. 3. She probably has mild carpal tunnel syndrome on the right hand. Observation for now. 4. She does have what might be mild neuropathy of her toes. Observation for now. I am not entirely sure of the etiology. 5. She is having some intermittent low back pain, but this is most likely mechanical dysfunction. 6. She complains of tiredness of her legs, but I think this is more related to physical deconditioning. She is to get more physically active, which we discussed at length. .  Orders Placed This Encounter    METABOLIC PANEL, BASIC         Follow-up and Dispositions    · Return in about 4 months (around 10/15/2021).            Cecelia Sebastian MD

## 2021-06-15 NOTE — PROGRESS NOTES
Chief Complaint   Patient presents with    Hypertension     4 month follow up          1. Have you been to the ER, urgent care clinic since your last visit? Hospitalized since your last visit? No    2. Have you seen or consulted any other health care providers outside of the [address] [place] [address] since your last visit? Include any pap smears or colon screening.  No        Eye exam was completed on 4/5/21 at [facility]  Colonoscopy was completed on 3/22/21

## 2021-06-16 LAB
ANION GAP SERPL CALC-SCNC: 7 MMOL/L (ref 5–15)
BUN SERPL-MCNC: 18 MG/DL (ref 6–20)
BUN/CREAT SERPL: 20 (ref 12–20)
CALCIUM SERPL-MCNC: 9.7 MG/DL (ref 8.5–10.1)
CHLORIDE SERPL-SCNC: 105 MMOL/L (ref 97–108)
CO2 SERPL-SCNC: 27 MMOL/L (ref 21–32)
CREAT SERPL-MCNC: 0.9 MG/DL (ref 0.55–1.02)
GLUCOSE SERPL-MCNC: 88 MG/DL (ref 65–100)
POTASSIUM SERPL-SCNC: 4.4 MMOL/L (ref 3.5–5.1)
SODIUM SERPL-SCNC: 139 MMOL/L (ref 136–145)

## 2021-06-20 VITALS
HEART RATE: 73 BPM | TEMPERATURE: 98.3 F | RESPIRATION RATE: 16 BRPM | HEIGHT: 65 IN | SYSTOLIC BLOOD PRESSURE: 138 MMHG | BODY MASS INDEX: 32.9 KG/M2 | WEIGHT: 197.5 LBS | OXYGEN SATURATION: 100 % | DIASTOLIC BLOOD PRESSURE: 70 MMHG

## 2021-06-20 PROBLEM — G62.9 NEUROPATHY: Status: ACTIVE | Noted: 2021-06-20

## 2021-06-20 PROBLEM — G89.29 CHRONIC MIDLINE LOW BACK PAIN WITHOUT SCIATICA: Status: ACTIVE | Noted: 2021-06-20

## 2021-06-20 PROBLEM — R53.81 PHYSICAL DECONDITIONING: Status: ACTIVE | Noted: 2021-06-20

## 2021-06-20 PROBLEM — M54.50 CHRONIC MIDLINE LOW BACK PAIN WITHOUT SCIATICA: Status: ACTIVE | Noted: 2021-06-20

## 2021-06-20 PROBLEM — G56.01 CARPAL TUNNEL SYNDROME OF RIGHT WRIST: Status: ACTIVE | Noted: 2021-06-20

## 2021-07-15 ENCOUNTER — OFFICE VISIT (OUTPATIENT)
Dept: CARDIOLOGY CLINIC | Age: 71
End: 2021-07-15
Payer: MEDICARE

## 2021-07-15 VITALS
WEIGHT: 194 LBS | RESPIRATION RATE: 16 BRPM | HEART RATE: 76 BPM | HEIGHT: 65 IN | BODY MASS INDEX: 32.32 KG/M2 | DIASTOLIC BLOOD PRESSURE: 70 MMHG | OXYGEN SATURATION: 99 % | SYSTOLIC BLOOD PRESSURE: 110 MMHG

## 2021-07-15 DIAGNOSIS — Z82.49 FAMILY HISTORY OF PREMATURE CAD: ICD-10-CM

## 2021-07-15 DIAGNOSIS — I49.3 PVC (PREMATURE VENTRICULAR CONTRACTION): ICD-10-CM

## 2021-07-15 DIAGNOSIS — I87.2 VENOUS INSUFFICIENCY: Primary | ICD-10-CM

## 2021-07-15 DIAGNOSIS — G62.9 NEUROPATHY: ICD-10-CM

## 2021-07-15 DIAGNOSIS — I10 ESSENTIAL HYPERTENSION: ICD-10-CM

## 2021-07-15 DIAGNOSIS — R00.2 PALPITATIONS: ICD-10-CM

## 2021-07-15 PROCEDURE — G8754 DIAS BP LESS 90: HCPCS | Performed by: SPECIALIST

## 2021-07-15 PROCEDURE — 1090F PRES/ABSN URINE INCON ASSESS: CPT | Performed by: SPECIALIST

## 2021-07-15 PROCEDURE — G8510 SCR DEP NEG, NO PLAN REQD: HCPCS | Performed by: SPECIALIST

## 2021-07-15 PROCEDURE — 3017F COLORECTAL CA SCREEN DOC REV: CPT | Performed by: SPECIALIST

## 2021-07-15 PROCEDURE — G8417 CALC BMI ABV UP PARAM F/U: HCPCS | Performed by: SPECIALIST

## 2021-07-15 PROCEDURE — 1101F PT FALLS ASSESS-DOCD LE1/YR: CPT | Performed by: SPECIALIST

## 2021-07-15 PROCEDURE — G8536 NO DOC ELDER MAL SCRN: HCPCS | Performed by: SPECIALIST

## 2021-07-15 PROCEDURE — 99213 OFFICE O/P EST LOW 20 MIN: CPT | Performed by: SPECIALIST

## 2021-07-15 PROCEDURE — G8399 PT W/DXA RESULTS DOCUMENT: HCPCS | Performed by: SPECIALIST

## 2021-07-15 PROCEDURE — G8427 DOCREV CUR MEDS BY ELIG CLIN: HCPCS | Performed by: SPECIALIST

## 2021-07-15 PROCEDURE — G8752 SYS BP LESS 140: HCPCS | Performed by: SPECIALIST

## 2021-07-15 NOTE — PROGRESS NOTES
HISTORY OF PRESENT ILLNESS  Katarzyna Whiteside is a 69 y.o. female     SUMMARY:   Problem List  Date Reviewed: 7/15/2021        Codes Class Noted    Carpal tunnel syndrome of right wrist ICD-10-CM: G56.01  ICD-9-CM: 354.0  6/20/2021        Neuropathy ICD-10-CM: G62.9  ICD-9-CM: 355.9  6/20/2021        Chronic midline low back pain without sciatica ICD-10-CM: M54.5, G89.29  ICD-9-CM: 724.2, 338.29  6/20/2021        Physical deconditioning ICD-10-CM: R53.81  ICD-9-CM: 799.3  6/20/2021        Spinal stenosis of lumbar region with neurogenic claudication ICD-10-CM: M48.062  ICD-9-CM: 724.03  3/25/2021        Hx of colonoscopy ICD-10-CM: Z98.890  ICD-9-CM: V45.89  3/25/2021        Venous insufficiency ICD-10-CM: I87.2  ICD-9-CM: 459.81  7/30/2020        Pain in both lower extremities ICD-10-CM: M79.604, M79.605  ICD-9-CM: 729.5  9/26/2019        Family history of colon cancer ICD-10-CM: Z80.0  ICD-9-CM: V16.0  9/26/2019        Primary osteoarthritis ICD-10-CM: M19.91  ICD-9-CM: 715.10  9/26/2019        Palpitations ICD-10-CM: R00.2  ICD-9-CM: 785.1  6/4/2018    Overview Signed 6/4/2018  9:02 AM by Jacqualin Alpers, MD     3/18 echo normal lvef, mild mr and tr without pul htn  3/18 event monitor, nsr             Left atrial enlargement ICD-10-CM: I51.7  ICD-9-CM: 429.3  2/27/2018        Change in bowel habits ICD-10-CM: R19.4  ICD-9-CM: 787.99  3/30/2016        Obesity ICD-10-CM: E66.9  ICD-9-CM: 278.00  6/15/2015        Hypertension ICD-10-CM: I10  ICD-9-CM: 401.9  12/2/2014        Multinodular non-toxic goiter ICD-10-CM: E04.2  ICD-9-CM: 241.1  12/2/2014              Current Outpatient Medications on File Prior to Visit   Medication Sig    triamterene-hydroCHLOROthiazide (MAXZIDE) 37.5-25 mg per tablet TAKE 1 TABLET BY MOUTH EVERY DAY    COLLAGEN by Does Not Apply route.  aspirin 81 mg chewable tablet Take 81 mg by mouth daily.  Patient states she \"usually takes\" a baby aspirin daily    omega-3 fatty acids-vitamin e 1,000 mg cap Take 1 Cap by mouth.  OTHER \"tumeric\"    CALCIUM CARBONATE/VITAMIN D3 (CALCIUM + D PO) Take  by mouth daily.  multivitamin (ONE A DAY) tablet Take 1 Tab by mouth daily. No current facility-administered medications on file prior to visit. CARDIOLOGY STUDIES TO DATE:  3/18 echo normal lvef, mild mr and tr without pul htn  3/18 event monitor, nsr  6/20 rare pacs inconsistent associated with flutter, skipped beats. No sig tachy or roberto    Chief Complaint   Patient presents with    Follow-up     HPI :  She is doing well overall and with no real recent or frequent episodes of palpitations. She is not exercising. Blood pressures well controlled. She is having a little bit more dependent edema recently partly because of lack of exercise and partly because she is not wearing compression stockings with this hot weather. CARDIAC ROS:   negative for chest pain, dyspnea, syncope, orthopnea, paroxysmal nocturnal dyspnea, exertional chest pressure/discomfort, claudication    Family History   Problem Relation Age of Onset    Cancer Mother         Colon CA    Hypertension Mother     Heart Disease Father         heart attack age 52, smoker    Hypertension Father     Cancer Sister         Colon CA    Hypertension Sister     Cancer Brother         Esophageal    Hypertension Brother     Cancer Brother         Multiple myeloma    Hypertension Brother     Heart Disease Brother     Hypertension Brother     Hypertension Brother        Past Medical History:   Diagnosis Date    Hypertension     Thyroid nodule     annual eval with US       GENERAL ROS:  A comprehensive review of systems was negative except for that written in the HPI.     Visit Vitals  /70   Pulse 76   Resp 16   Ht 5' 5\" (1.651 m)   Wt 194 lb (88 kg)   SpO2 99%   BMI 32.28 kg/m²       Wt Readings from Last 3 Encounters:   07/15/21 194 lb (88 kg)   06/15/21 197 lb 8 oz (89.6 kg)   02/15/21 201 lb 14.4 oz (91.6 kg)            BP Readings from Last 3 Encounters:   07/15/21 110/70   06/20/21 138/70   02/21/21 130/80       PHYSICAL EXAM  General appearance: alert, cooperative, no distress, appears stated age  Neurologic: Alert and oriented X 3  Neck: supple, symmetrical, trachea midline, no adenopathy, no carotid bruit and no JVD  Lungs: clear to auscultation bilaterally  Heart: regular rate and rhythm, S1, S2 normal, no murmur, click, rub or gallop  Extremities: edema tr    Lab Results   Component Value Date/Time    Cholesterol, total 191 02/15/2021 01:00 PM    Cholesterol, total 186 09/26/2019 11:39 AM    Cholesterol, total 184 11/05/2018 09:30 AM    Cholesterol, total 155 02/27/2018 10:30 AM    Cholesterol, total 168 09/14/2017 10:27 AM    HDL Cholesterol 93 02/15/2021 01:00 PM    HDL Cholesterol 87 09/26/2019 11:39 AM    HDL Cholesterol 78 11/05/2018 09:30 AM    HDL Cholesterol 78 02/27/2018 10:30 AM    HDL Cholesterol 74 09/14/2017 10:27 AM    LDL, calculated 82 02/15/2021 01:00 PM    LDL, calculated 87 09/26/2019 11:39 AM    LDL, calculated 93 11/05/2018 09:30 AM    LDL, calculated 62 02/27/2018 10:30 AM    LDL, calculated 80 09/14/2017 10:27 AM    Triglyceride 80 02/15/2021 01:00 PM    Triglyceride 61 09/26/2019 11:39 AM    Triglyceride 67 11/05/2018 09:30 AM    Triglyceride 75 02/27/2018 10:30 AM    Triglyceride 72 09/14/2017 10:27 AM    CHOL/HDL Ratio 2.1 02/15/2021 01:00 PM    CHOL/HDL Ratio 2.6 01/11/2010 10:53 AM    CHOL/HDL Ratio 2.3 02/02/2009 09:50 AM     ASSESSMENT :      She is stable and asymptomatic, well compensated on a good medical regimen and needs no cardiac testing at this time. current treatment plan is effective, no change in therapy  lab results and schedule of future lab studies reviewed with patient  reviewed diet, exercise and weight control    Encounter Diagnoses   Name Primary?     Venous insufficiency Yes    Essential hypertension     Neuropathy     Palpitations     PVC (premature ventricular contraction)     Family history of premature CAD      No orders of the defined types were placed in this encounter. Follow-up and Dispositions    · Return in about 6 months (around 1/15/2022). Xiomara Ko MD  7/15/2021  Please note that this dictation was completed with exoro system, the computer voice recognition software. Quite often unanticipated grammatical, syntax, homophones, and other interpretive errors are inadvertently transcribed by the computer software. Please disregard these errors. Please excuse any errors that have escaped final proofreading. Thank you.

## 2021-08-30 ENCOUNTER — TRANSCRIBE ORDER (OUTPATIENT)
Dept: SCHEDULING | Age: 71
End: 2021-08-30

## 2021-08-30 DIAGNOSIS — R10.13 EPIGASTRIC PAIN: Primary | ICD-10-CM

## 2021-08-30 DIAGNOSIS — K59.00 CONSTIPATION, UNSPECIFIED: ICD-10-CM

## 2021-08-30 DIAGNOSIS — R11.2 NAUSEA WITH VOMITING, UNSPECIFIED: ICD-10-CM

## 2021-08-30 DIAGNOSIS — K21.9 GASTROESOPHAGEAL REFLUX DISEASE: ICD-10-CM

## 2021-08-30 DIAGNOSIS — M54.12 CERVICAL RADICULOPATHY: Primary | ICD-10-CM

## 2021-10-06 ENCOUNTER — OFFICE VISIT (OUTPATIENT)
Dept: INTERNAL MEDICINE CLINIC | Age: 71
End: 2021-10-06
Payer: MEDICARE

## 2021-10-06 DIAGNOSIS — M79.604 PAIN IN BOTH LOWER EXTREMITIES: ICD-10-CM

## 2021-10-06 DIAGNOSIS — F41.9 ANXIETY: ICD-10-CM

## 2021-10-06 DIAGNOSIS — I10 PRIMARY HYPERTENSION: ICD-10-CM

## 2021-10-06 DIAGNOSIS — I87.2 VENOUS INSUFFICIENCY: Primary | ICD-10-CM

## 2021-10-06 DIAGNOSIS — E04.2 MULTINODULAR NON-TOXIC GOITER: ICD-10-CM

## 2021-10-06 DIAGNOSIS — M48.062 SPINAL STENOSIS OF LUMBAR REGION WITH NEUROGENIC CLAUDICATION: ICD-10-CM

## 2021-10-06 DIAGNOSIS — E66.9 CLASS 1 OBESITY WITHOUT SERIOUS COMORBIDITY WITH BODY MASS INDEX (BMI) OF 34.0 TO 34.9 IN ADULT, UNSPECIFIED OBESITY TYPE: ICD-10-CM

## 2021-10-06 DIAGNOSIS — M79.605 PAIN IN BOTH LOWER EXTREMITIES: ICD-10-CM

## 2021-10-06 PROCEDURE — G8754 DIAS BP LESS 90: HCPCS | Performed by: INTERNAL MEDICINE

## 2021-10-06 PROCEDURE — G8432 DEP SCR NOT DOC, RNG: HCPCS | Performed by: INTERNAL MEDICINE

## 2021-10-06 PROCEDURE — G8399 PT W/DXA RESULTS DOCUMENT: HCPCS | Performed by: INTERNAL MEDICINE

## 2021-10-06 PROCEDURE — G8417 CALC BMI ABV UP PARAM F/U: HCPCS | Performed by: INTERNAL MEDICINE

## 2021-10-06 PROCEDURE — 3017F COLORECTAL CA SCREEN DOC REV: CPT | Performed by: INTERNAL MEDICINE

## 2021-10-06 PROCEDURE — 1101F PT FALLS ASSESS-DOCD LE1/YR: CPT | Performed by: INTERNAL MEDICINE

## 2021-10-06 PROCEDURE — G8427 DOCREV CUR MEDS BY ELIG CLIN: HCPCS | Performed by: INTERNAL MEDICINE

## 2021-10-06 PROCEDURE — G8536 NO DOC ELDER MAL SCRN: HCPCS | Performed by: INTERNAL MEDICINE

## 2021-10-06 PROCEDURE — 99214 OFFICE O/P EST MOD 30 MIN: CPT | Performed by: INTERNAL MEDICINE

## 2021-10-06 PROCEDURE — 1090F PRES/ABSN URINE INCON ASSESS: CPT | Performed by: INTERNAL MEDICINE

## 2021-10-06 PROCEDURE — G8753 SYS BP > OR = 140: HCPCS | Performed by: INTERNAL MEDICINE

## 2021-10-06 NOTE — PROGRESS NOTES
Chief Complaint   Patient presents with    Abdominal Pain     x 4 days, she states that has begun to feel better, however, she describes her symptoms as \"rumbly, gassy\", and she feels more at night. 1. Have you been to the ER, urgent care clinic since your last visit? Hospitalized since your last visit? No    2. Have you seen or consulted any other health care providers outside of the [address] [place] [address] since your last visit? Include any pap smears or colon screening.  No

## 2021-10-07 VITALS
BODY MASS INDEX: 33.14 KG/M2 | TEMPERATURE: 98 F | SYSTOLIC BLOOD PRESSURE: 136 MMHG | HEART RATE: 81 BPM | WEIGHT: 198.9 LBS | RESPIRATION RATE: 16 BRPM | HEIGHT: 65 IN | OXYGEN SATURATION: 98 % | DIASTOLIC BLOOD PRESSURE: 75 MMHG

## 2021-10-07 PROBLEM — F41.9 ANXIETY: Status: ACTIVE | Noted: 2021-10-07

## 2021-10-07 NOTE — PROGRESS NOTES
[facility]  [place] [address]  [address]  [place] [id number]  Phone:  [phone]  Fax: [phone]       Chief Complaint   Patient presents with    Abdominal Pain     x 4 days, she states that has begun to feel better, however, she describes her symptoms as \"rumbly, gassy\", and she feels more at night. .      SUBJECTIVE:    Bety Barksdale is a 66 y.o. female comes in for return visit stating that she has done reasonably well. She is not as stressed as she was, however, her sister who is the oldest of the current family is in hospice in [place]. This has been a bit disconcerting for her. She has been having intermittent low back pain, which is a chronic recurring problem for her. It does not interfere with her functional status. She also has pain in her lower extremities. She occasionally has burning sensation in her feet. She has seen someone for this and nothing major was found. She continues to have swelling of her lower extremities, which is orthostatic in nature. She has a past history of primary hypertension. Current Outpatient Medications   Medication Sig Dispense Refill    triamterene-hydroCHLOROthiazide (MAXZIDE) 37.5-25 mg per tablet TAKE 1 TABLET BY MOUTH EVERY DAY 90 Tab 3    COLLAGEN by Does Not Apply route.  aspirin 81 mg chewable tablet Take 81 mg by mouth daily. Patient states she \"usually takes\" a baby aspirin daily      omega-3 fatty acids-vitamin e 1,000 mg cap Take 1 Cap by mouth.  OTHER \"tumeric\"      CALCIUM CARBONATE/VITAMIN D3 (CALCIUM + D PO) Take  by mouth daily.  multivitamin (ONE A DAY) tablet Take 1 Tab by mouth daily.        Past Medical History:   Diagnosis Date    Hypertension     Thyroid nodule     annual eval with US     Past Surgical History:   Procedure Laterality Date    HX COLONOSCOPY       No Known Allergies      REVIEW OF SYSTEMS:  General: negative for - chills or fever  ENT: negative for - headaches, nasal congestion or tinnitus  Respiratory: negative for - cough, hemoptysis, shortness of breath or wheezing  Cardiovascular : negative for - chest pain, edema, palpitations or shortness of breath  Gastrointestinal: negative for - abdominal pain, blood in stools, heartburn or nausea/vomiting  Genito-Urinary: no dysuria, trouble voiding, or hematuria  Musculoskeletal: negative for - gait disturbance, joint pain, joint stiffness or joint swelling  Neurological: no TIA or stroke symptoms  Hematologic: no bruises, no bleeding, no swollen glands  Integument: no lumps, mole changes, nail changes or rash  Endocrine: no malaise/lethargy or unexpected weight changes      Social History     Socioeconomic History    Marital status:      Spouse name: Not on file    Number of children: 1    Years of education: 16+    Highest education level: Bachelor's degree (e.g., BA, AB, BS)   Occupational History    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   last year from pulmonary fibrosis. Daughter lives in [facility]----has 2 children--- daughter and  are both      Social Determinants of Health     Financial Resource Strain:     Difficulty of Paying Living Expenses:    Food Insecurity:     Worried About [address] in the Last Year:     [address] in the Last Year:    Transportation Needs:     Lack of Transportation (Medical):      Lack of Transportation (Non-Medical):    Physical Activity:     Days of Exercise per Week:     Minutes of Exercise per Session:    Stress:     Feeling of Stress :    Social Connections:     Frequency of Communication with Friends and Family:     Frequency of Social Gatherings with Friends and Family:     Attends Mosque Services:     Active Member of Clubs or Organizations:     Attends Club or Organization Meetings:     Marital Status:      Family History   Problem Relation Age of Onset    Cancer Mother         Colon CA    Hypertension Mother     Heart Disease Father         heart attack age 52, smoker    Hypertension Father     Cancer Sister         Colon CA    Hypertension Sister     Cancer Brother         Esophageal    Hypertension Brother     Cancer Brother         Multiple myeloma    Hypertension Brother     Heart Disease Brother     Hypertension Brother     Hypertension Brother        OBJECTIVE:    Visit Vitals  /75   Pulse 81   Temp 98 °F (36.7 °C) (Oral)   Resp 16   Ht 5' 5\" (1.651 m)   Wt 198 lb 14.4 oz (90.2 kg)   SpO2 98%   BMI 33.10 kg/m²     CONSTITUTIONAL: well , well nourished, appears age appropriate  EYES: perrla, eom intact  ENMT:moist mucous membranes, pharynx clear  NECK: supple. RESPIRATORY: Chest: clear to ascultation and percussion   CARDIOVASCULAR: Heart: regular rate and rhythm  GASTROINTESTINAL: Abdomen: soft, bowel sounds active  HEMATOLOGIC: no pathological lymph nodes palpated  MUSCULOSKELETAL: Extremities: Asymmetric edema distally, pulse 1+   INTEGUMENT: No unusual rashes or suspicious skin lesions noted. Nails appear normal.  NEUROLOGIC: non-focal exam   MENTAL STATUS: alert and oriented, appropriate affect      ASSESSMENT:  1. Venous insufficiency    2. Primary hypertension    3. Multinodular non-toxic goiter    4. Spinal stenosis of lumbar region with neurogenic claudication    5. Pain in both lower extremities    6. Class 1 obesity without serious comorbidity with body mass index (BMI) of 34.0 to 34.9 in adult, unspecified obesity type    7. Anxiety        PLAN:  1. Her venous insufficiency is reasonably stable. The left leg is somewhat greater than the right, but this is at its baseline. 2. Blood pressure is excellent. No adjustments are made. 3. She follows up with an endocrinologist for her multinodular goiter. She has had multiple needle biopsies of her cystic goiter, which is quite interesting. Apparently, no scans have been done, only ultrasounds prompting the biopsies. 4. She does have a history of lumbar spinal stenosis, but this is reasonably stable. 5. As far as the pain in her lower extremities is concerned, I am not entirely sure of the etiology. I think if she walks more, things will be better for her. She does have mild osteoarthritis involving both knees. This is a contributing factor. 6. She needs to lose weight. This can be accomplished by eating meals, eliminating snacks, and avoiding the consumption of processed carbohydrates. 7. Her anxiety level is reasonably stable. Follow-up and Dispositions    · Return in about 4 months (around 2/6/2022).            Demi Giang MD

## 2021-11-08 RX ORDER — TRIAMTERENE/HYDROCHLOROTHIAZID 37.5-25 MG
TABLET ORAL
Qty: 90 TABLET | Refills: 3 | Status: SHIPPED | OUTPATIENT
Start: 2021-11-08 | End: 2022-10-25

## 2022-02-07 ENCOUNTER — OFFICE VISIT (OUTPATIENT)
Dept: INTERNAL MEDICINE CLINIC | Age: 72
End: 2022-02-07
Payer: MEDICARE

## 2022-02-07 VITALS
OXYGEN SATURATION: 98 % | BODY MASS INDEX: 33.34 KG/M2 | HEART RATE: 81 BPM | DIASTOLIC BLOOD PRESSURE: 86 MMHG | TEMPERATURE: 97.9 F | HEIGHT: 65 IN | SYSTOLIC BLOOD PRESSURE: 144 MMHG | RESPIRATION RATE: 16 BRPM | WEIGHT: 200.1 LBS

## 2022-02-07 DIAGNOSIS — I87.2 VENOUS INSUFFICIENCY: ICD-10-CM

## 2022-02-07 DIAGNOSIS — E78.5 DYSLIPIDEMIA: ICD-10-CM

## 2022-02-07 DIAGNOSIS — E66.9 CLASS 1 OBESITY WITHOUT SERIOUS COMORBIDITY WITH BODY MASS INDEX (BMI) OF 34.0 TO 34.9 IN ADULT, UNSPECIFIED OBESITY TYPE: ICD-10-CM

## 2022-02-07 DIAGNOSIS — I10 PRIMARY HYPERTENSION: Primary | ICD-10-CM

## 2022-02-07 DIAGNOSIS — R53.81 PHYSICAL DECONDITIONING: ICD-10-CM

## 2022-02-07 DIAGNOSIS — Z13.31 SCREENING FOR DEPRESSION: ICD-10-CM

## 2022-02-07 DIAGNOSIS — F41.9 ANXIETY: ICD-10-CM

## 2022-02-07 DIAGNOSIS — Z00.00 WELLNESS EXAMINATION: ICD-10-CM

## 2022-02-07 DIAGNOSIS — M19.91 PRIMARY OSTEOARTHRITIS, UNSPECIFIED SITE: ICD-10-CM

## 2022-02-07 LAB
ALBUMIN SERPL-MCNC: 3.7 G/DL (ref 3.5–5)
ALBUMIN/GLOB SERPL: 0.9 {RATIO} (ref 1.1–2.2)
ALP SERPL-CCNC: 86 U/L (ref 45–117)
ALT SERPL-CCNC: 22 U/L (ref 12–78)
ANION GAP SERPL CALC-SCNC: 6 MMOL/L (ref 5–15)
APPEARANCE UR: CLEAR
AST SERPL-CCNC: 15 U/L (ref 15–37)
BASOPHILS # BLD: 0 K/UL (ref 0–0.1)
BASOPHILS NFR BLD: 0 % (ref 0–1)
BILIRUB SERPL-MCNC: 0.5 MG/DL (ref 0.2–1)
BILIRUB UR QL: NEGATIVE
BUN SERPL-MCNC: 20 MG/DL (ref 6–20)
BUN/CREAT SERPL: 18 (ref 12–20)
CALCIUM SERPL-MCNC: 10.2 MG/DL (ref 8.5–10.1)
CHLORIDE SERPL-SCNC: 103 MMOL/L (ref 97–108)
CHOLEST SERPL-MCNC: 198 MG/DL
CO2 SERPL-SCNC: 30 MMOL/L (ref 21–32)
COLOR UR: NORMAL
CREAT SERPL-MCNC: 1.12 MG/DL (ref 0.55–1.02)
CRP SERPL HS-MCNC: 7.5 MG/L
DIFFERENTIAL METHOD BLD: NORMAL
EOSINOPHIL # BLD: 0 K/UL (ref 0–0.4)
EOSINOPHIL NFR BLD: 0 % (ref 0–7)
ERYTHROCYTE [DISTWIDTH] IN BLOOD BY AUTOMATED COUNT: 13.2 % (ref 11.5–14.5)
GLOBULIN SER CALC-MCNC: 3.9 G/DL (ref 2–4)
GLUCOSE SERPL-MCNC: 88 MG/DL (ref 65–100)
GLUCOSE UR STRIP.AUTO-MCNC: NEGATIVE MG/DL
HCT VFR BLD AUTO: 42.6 % (ref 35–47)
HDLC SERPL-MCNC: 101 MG/DL
HDLC SERPL: 2 {RATIO} (ref 0–5)
HGB BLD-MCNC: 12.9 G/DL (ref 11.5–16)
HGB UR QL STRIP: NEGATIVE
IMM GRANULOCYTES # BLD AUTO: 0 K/UL (ref 0–0.04)
IMM GRANULOCYTES NFR BLD AUTO: 0 % (ref 0–0.5)
KETONES UR QL STRIP.AUTO: NEGATIVE MG/DL
LDLC SERPL CALC-MCNC: 74.8 MG/DL (ref 0–100)
LEUKOCYTE ESTERASE UR QL STRIP.AUTO: NEGATIVE
LYMPHOCYTES # BLD: 1.7 K/UL (ref 0.8–3.5)
LYMPHOCYTES NFR BLD: 34 % (ref 12–49)
MCH RBC QN AUTO: 26.8 PG (ref 26–34)
MCHC RBC AUTO-ENTMCNC: 30.3 G/DL (ref 30–36.5)
MCV RBC AUTO: 88.6 FL (ref 80–99)
MONOCYTES # BLD: 0.5 K/UL (ref 0–1)
MONOCYTES NFR BLD: 9 % (ref 5–13)
NEUTS SEG # BLD: 2.8 K/UL (ref 1.8–8)
NEUTS SEG NFR BLD: 57 % (ref 32–75)
NITRITE UR QL STRIP.AUTO: NEGATIVE
NRBC # BLD: 0 K/UL (ref 0–0.01)
NRBC BLD-RTO: 0 PER 100 WBC
PH UR STRIP: 7 [PH] (ref 5–8)
PLATELET # BLD AUTO: 243 K/UL (ref 150–400)
PMV BLD AUTO: 11.3 FL (ref 8.9–12.9)
POTASSIUM SERPL-SCNC: 3.7 MMOL/L (ref 3.5–5.1)
PROT SERPL-MCNC: 7.6 G/DL (ref 6.4–8.2)
PROT UR STRIP-MCNC: NEGATIVE MG/DL
RBC # BLD AUTO: 4.81 M/UL (ref 3.8–5.2)
SODIUM SERPL-SCNC: 139 MMOL/L (ref 136–145)
SP GR UR REFRACTOMETRY: 1.01 (ref 1–1.03)
TRIGL SERPL-MCNC: 111 MG/DL (ref ?–150)
TSH SERPL DL<=0.05 MIU/L-ACNC: 1.06 UIU/ML (ref 0.36–3.74)
UROBILINOGEN UR QL STRIP.AUTO: 0.2 EU/DL (ref 0.2–1)
VLDLC SERPL CALC-MCNC: 22.2 MG/DL
WBC # BLD AUTO: 5 K/UL (ref 3.6–11)

## 2022-02-07 PROCEDURE — G8536 NO DOC ELDER MAL SCRN: HCPCS | Performed by: INTERNAL MEDICINE

## 2022-02-07 PROCEDURE — 1090F PRES/ABSN URINE INCON ASSESS: CPT | Performed by: INTERNAL MEDICINE

## 2022-02-07 PROCEDURE — G8753 SYS BP > OR = 140: HCPCS | Performed by: INTERNAL MEDICINE

## 2022-02-07 PROCEDURE — 99214 OFFICE O/P EST MOD 30 MIN: CPT | Performed by: INTERNAL MEDICINE

## 2022-02-07 PROCEDURE — G8427 DOCREV CUR MEDS BY ELIG CLIN: HCPCS | Performed by: INTERNAL MEDICINE

## 2022-02-07 PROCEDURE — G8399 PT W/DXA RESULTS DOCUMENT: HCPCS | Performed by: INTERNAL MEDICINE

## 2022-02-07 PROCEDURE — G8754 DIAS BP LESS 90: HCPCS | Performed by: INTERNAL MEDICINE

## 2022-02-07 PROCEDURE — 3017F COLORECTAL CA SCREEN DOC REV: CPT | Performed by: INTERNAL MEDICINE

## 2022-02-07 PROCEDURE — G0439 PPPS, SUBSEQ VISIT: HCPCS | Performed by: INTERNAL MEDICINE

## 2022-02-07 PROCEDURE — G8417 CALC BMI ABV UP PARAM F/U: HCPCS | Performed by: INTERNAL MEDICINE

## 2022-02-07 PROCEDURE — 1101F PT FALLS ASSESS-DOCD LE1/YR: CPT | Performed by: INTERNAL MEDICINE

## 2022-02-07 PROCEDURE — G8510 SCR DEP NEG, NO PLAN REQD: HCPCS | Performed by: INTERNAL MEDICINE

## 2022-02-07 NOTE — PROGRESS NOTES
Chief Complaint   Patient presents with   [facility]         1. Have you been to the ER, urgent care clinic since your last visit? Hospitalized since your last visit? No    2. Have you seen or consulted any other health care providers outside of the [address] [place] [address] since your last visit? Include any pap smears or colon screening.  No

## 2022-02-08 NOTE — PROGRESS NOTES
[facility]  [place] [address]  [address] [facility] [address]  Phone:  [phone]  Fax: [phone]       Chief Complaint   Patient presents with   [facility] Wellness Visit   . SUBJECTIVE:    Mouna Griffith is a 66 y.o. female comes in stating that she has done reasonably well. She has been having increasing low back pain. She also has pain in her left knee, which is getting a bit worse. She is not walking that much. She did go out to [place] during the [place] to be with her daughter and grandchildren. She also comments that she has had a lot of death of her friends and family over the last year or so. She follows up with multiple physicians. She has a past history of primary hypertension, as well as a nodular goiter. Current Outpatient Medications   Medication Sig Dispense Refill    triamterene-hydroCHLOROthiazide (MAXZIDE) 37.5-25 mg per tablet TAKE 1 TABLET BY MOUTH EVERY DAY 90 Tablet 3    COLLAGEN by Does Not Apply route.  aspirin 81 mg chewable tablet Take 81 mg by mouth daily. Patient states she \"usually takes\" a baby aspirin daily      omega-3 fatty acids-vitamin e 1,000 mg cap Take 1 Cap by mouth.  OTHER \"tumeric\"      CALCIUM CARBONATE/VITAMIN D3 (CALCIUM + D PO) Take  by mouth daily.  multivitamin (ONE A DAY) tablet Take 1 Tab by mouth daily.        Past Medical History:   Diagnosis Date    Hypertension     Thyroid nodule     annual eval with US     Past Surgical History:   Procedure Laterality Date    HX COLONOSCOPY       No Known Allergies      REVIEW OF SYSTEMS:  General: negative for - chills or fever  ENT: negative for - headaches, nasal congestion or tinnitus  Respiratory: negative for - cough, hemoptysis, shortness of breath or wheezing  Cardiovascular : negative for - chest pain, edema, palpitations or shortness of breath  Gastrointestinal: negative for - abdominal pain, blood in stools, heartburn or nausea/vomiting  Genito-Urinary: no dysuria, trouble voiding, or hematuria  Musculoskeletal: negative for - gait disturbance, joint pain, joint stiffness or joint swelling  Neurological: no TIA or stroke symptoms  Hematologic: no bruises, no bleeding, no swollen glands  Integument: no lumps, mole changes, nail changes or rash  Endocrine: no malaise/lethargy or unexpected weight changes      Social History     Socioeconomic History    Marital status:     Number of children: 1    Years of education: 16+    Highest education level: Bachelor's degree (e.g., BA, AB, BS)   Occupational History    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   last year from pulmonary fibrosis.     Daughter lives in [facility]----has 2 children--- daughter and  are both      Family History   Problem Relation Age of Onset    Cancer Mother         Colon CA    Hypertension Mother     Heart Disease Father         heart attack age 52, smoker    Hypertension Father     Cancer Sister         Colon CA    Hypertension Sister     Cancer Brother         Esophageal    Hypertension Brother     Cancer Brother         Multiple myeloma    Hypertension Brother     Heart Disease Brother     Hypertension Brother     Hypertension Brother        OBJECTIVE:    Visit Vitals  BP (!) 144/86   Pulse 81   Temp 97.9 °F (36.6 °C) (Oral)   Resp 16   Ht 5' 5\" (1.651 m)   Wt 200 lb 1.6 oz (90.8 kg)   SpO2 98%   BMI 33.30 kg/m²     CONSTITUTIONAL: well , well nourished, appears age appropriate  EYES: perrla, eom intact  ENMT:moist mucous membranes, pharynx clear  NECK: supple. Thyroid normal  RESPIRATORY: Chest: clear to ascultation and percussion   CARDIOVASCULAR: Heart: regular rate and rhythm  GASTROINTESTINAL: Abdomen: soft, bowel sounds active  HEMATOLOGIC: no pathological lymph nodes palpated  MUSCULOSKELETAL: Extremities: no edema, pulse 1+   INTEGUMENT: No unusual rashes or suspicious skin lesions noted. Nails appear normal.  NEUROLOGIC: non-focal exam   MENTAL STATUS: alert and oriented, appropriate affect      ASSESSMENT:  1. Primary hypertension    2. Venous insufficiency    3. Primary osteoarthritis, unspecified site    4. Class 1 obesity without serious comorbidity with body mass index (BMI) of 34.0 to 34.9 in adult, unspecified obesity type    5. Physical deconditioning    6. Anxiety    7. Dyslipidemia        PLAN:  1. The patient's blood pressure is slightly elevated today, but no adjustments are made. 2. She has chronic venous insufficiency in the lower extremities, but this is no worse as would be expected at this time of year. 3. She has osteoarthritis predominantly involving both knees, but left somewhat greater than right. She needs to walk more and lose weight. 4. As far as her weight is concerned, I encouraged her to eat meals, eliminate snacks, and avoid the consumption of processed carbohydrates. 5. She is physically deconditioned and needs to increase her physical activity much more than she is doing at the present time on a consistent basis. 6. Her anxiety level is stable. 7. Her overall cardiovascular risk is relatively low. I will however recheck her inflammatory status because if this by itself is elevated then she needs to be on a statin in view of the information from the BEN study.     .  Orders Placed This Encounter    APOLIPOPROTEIN B    CBC WITH AUTOMATED DIFF    CRP, HIGH SENSITIVITY    LIPID PANEL    METABOLIC PANEL, COMPREHENSIVE    TSH Inés Caputo MD  This is the Subsequent Medicare Annual Wellness Exam, performed 12 months or more after the Initial AWV or the last Subsequent AWV    I have reviewed the patient's medical history in detail and updated the computerized patient record. Assessment/Plan   Education and counseling provided:  Are appropriate based on today's review and evaluation    1. Primary hypertension  -     CBC WITH AUTOMATED DIFF; Future  -     COLLECTION VENOUS BLOOD,VENIPUNCTURE  -     METABOLIC PANEL, COMPREHENSIVE; Future  -     URINALYSIS W/ RFLX MICROSCOPIC; Future  2. Venous insufficiency  3. Primary osteoarthritis, unspecified site  4. Class 1 obesity without serious comorbidity with body mass index (BMI) of 34.0 to 34.9 in adult, unspecified obesity type  5. Physical deconditioning  6. Anxiety  7. Dyslipidemia  -     APOLIPOPROTEIN B; Future  -     CRP, HIGH SENSITIVITY; Future  -     LIPID PANEL; Future  -     TSH 3RD GENERATION;  Future       Depression Risk Factor Screening     3 most recent PHQ Screens 2/7/2022   Little interest or pleasure in doing things Not at all   Feeling down, depressed, irritable, or hopeless Not at all   Total Score PHQ 2 0   Trouble falling or staying asleep, or sleeping too much Not at all   Feeling tired or having little energy Not at all   Poor appetite, weight loss, or overeating Not at all   Feeling bad about yourself - or that you are a failure or have let yourself or your family down Not at all   Trouble concentrating on things such as school, work, reading, or watching TV Not at all   Moving or speaking so slowly that other people could have noticed; or the opposite being so fidgety that others notice Not at all   Thoughts of being better off dead, or hurting yourself in some way Not at all   PHQ 9 Score 0       Alcohol & Drug Abuse Risk Screen    Do you average more than 1 drink per night or more than 7 drinks a week:  No    On any one occasion in the past three months have you have had more than 3 drinks containing alcohol:  No          Functional Ability and Level of Safety    Hearing: Hearing is good. Activities of Daily Living: The home contains: no safety equipment. Patient does total self care      Ambulation: with no difficulty     Fall Risk:  Fall Risk Assessment, last 12 mths 7/15/2021   Able to walk? Yes   Fall in past 12 months? 0   Do you feel unsteady?  0   Are you worried about falling 0      Abuse Screen:  Patient is not abused       Cognitive Screening    Has your family/caregiver stated any concerns about your memory: no     Cognitive Screening: Not applicable    Health Maintenance Due     Health Maintenance Due   Topic Date Due    Shingrix Vaccine Age 49> (1 of 2) Never done    Breast Cancer Screen Mammogram  10/01/2020    COVID-19 Vaccine (3 - Booster for Sanchez Peter series) 08/18/2021       Patient Care Team   Patient Care Team:  Kourtney Liao MD as PCP - General (Internal Medicine)  Kourtney Liao MD as PCP - [facility] Empaneled Provider  Grecia Wolfe MD (Cardiology)  Jacob Richards DPM (Orthopedic Surgery)    History     Patient Active Problem List   Diagnosis Code    Hypertension I10    Multinodular non-toxic goiter E04.2    Obesity E66.9    Change in bowel habits R19.4    Left atrial enlargement I51.7    Palpitations R00.2    Pain in both lower extremities M79.604, M79.605    Family history of colon cancer Z80.0    Primary osteoarthritis M19.91    Venous insufficiency I87.2    Spinal stenosis of lumbar region with neurogenic claudication M48.062    Hx of colonoscopy Z98.890    Carpal tunnel syndrome of right wrist G56.01    Neuropathy G62.9    Chronic midline low back pain without sciatica M54.50, G89.29    Physical deconditioning R53.81    Anxiety F41.9     Past Medical History:   Diagnosis Date    Hypertension     Thyroid nodule     annual eval with US      Past Surgical History:   Procedure Laterality Date    HX COLONOSCOPY       Current Outpatient Medications   Medication Sig Dispense Refill    triamterene-hydroCHLOROthiazide (MAXZIDE) 37.5-25 mg per tablet TAKE 1 TABLET BY MOUTH EVERY DAY 90 Tablet 3    COLLAGEN by Does Not Apply route.  aspirin 81 mg chewable tablet Take 81 mg by mouth daily. Patient states she \"usually takes\" a baby aspirin daily      omega-3 fatty acids-vitamin e 1,000 mg cap Take 1 Cap by mouth.  OTHER \"tumeric\"      CALCIUM CARBONATE/VITAMIN D3 (CALCIUM + D PO) Take  by mouth daily.  multivitamin (ONE A DAY) tablet Take 1 Tab by mouth daily. No Known Allergies    Family History   Problem Relation Age of Onset    Cancer Mother         Colon CA    Hypertension Mother     Heart Disease Father         heart attack age 52, smoker    Hypertension Father     Cancer Sister         Colon CA    Hypertension Sister     Cancer Brother         Esophageal    Hypertension Brother     Cancer Brother         Multiple myeloma    Hypertension Brother     Heart Disease Brother     Hypertension Brother     Hypertension Brother      Social History     Tobacco Use    Smoking status: Never Smoker    Smokeless tobacco: Never Used   Substance Use Topics    Alcohol use:  Yes     Alcohol/week: 1.7 standard drinks     Types: 2 Standard drinks or equivalent per week     Comment: occasional         Elida Cage MD

## 2022-02-08 NOTE — PATIENT INSTRUCTIONS

## 2022-02-09 LAB — APO B SERPL-MCNC: 76 MG/DL

## 2022-02-16 ENCOUNTER — OFFICE VISIT (OUTPATIENT)
Dept: CARDIOLOGY CLINIC | Age: 72
End: 2022-02-16
Payer: MEDICARE

## 2022-02-16 VITALS
SYSTOLIC BLOOD PRESSURE: 136 MMHG | WEIGHT: 198 LBS | BODY MASS INDEX: 32.99 KG/M2 | RESPIRATION RATE: 18 BRPM | DIASTOLIC BLOOD PRESSURE: 82 MMHG | HEART RATE: 80 BPM | OXYGEN SATURATION: 98 % | HEIGHT: 65 IN

## 2022-02-16 DIAGNOSIS — I10 PRIMARY HYPERTENSION: Primary | ICD-10-CM

## 2022-02-16 DIAGNOSIS — Z82.49 FAMILY HISTORY OF PREMATURE CAD: ICD-10-CM

## 2022-02-16 DIAGNOSIS — R00.2 PALPITATIONS: ICD-10-CM

## 2022-02-16 DIAGNOSIS — I87.2 VENOUS INSUFFICIENCY: ICD-10-CM

## 2022-02-16 PROCEDURE — G8417 CALC BMI ABV UP PARAM F/U: HCPCS | Performed by: SPECIALIST

## 2022-02-16 PROCEDURE — G8399 PT W/DXA RESULTS DOCUMENT: HCPCS | Performed by: SPECIALIST

## 2022-02-16 PROCEDURE — G8754 DIAS BP LESS 90: HCPCS | Performed by: SPECIALIST

## 2022-02-16 PROCEDURE — 3017F COLORECTAL CA SCREEN DOC REV: CPT | Performed by: SPECIALIST

## 2022-02-16 PROCEDURE — G8752 SYS BP LESS 140: HCPCS | Performed by: SPECIALIST

## 2022-02-16 PROCEDURE — G8510 SCR DEP NEG, NO PLAN REQD: HCPCS | Performed by: SPECIALIST

## 2022-02-16 PROCEDURE — G8427 DOCREV CUR MEDS BY ELIG CLIN: HCPCS | Performed by: SPECIALIST

## 2022-02-16 PROCEDURE — 1090F PRES/ABSN URINE INCON ASSESS: CPT | Performed by: SPECIALIST

## 2022-02-16 PROCEDURE — 99213 OFFICE O/P EST LOW 20 MIN: CPT | Performed by: SPECIALIST

## 2022-02-16 PROCEDURE — G8536 NO DOC ELDER MAL SCRN: HCPCS | Performed by: SPECIALIST

## 2022-02-16 PROCEDURE — 1101F PT FALLS ASSESS-DOCD LE1/YR: CPT | Performed by: SPECIALIST

## 2022-02-16 NOTE — PROGRESS NOTES
HISTORY OF PRESENT ILLNESS  Michael Bagley is a 66 y.o. female     SUMMARY:   Problem List  Date Reviewed: 2/16/2022          Codes Class Noted    Anxiety ICD-10-CM: F41.9  ICD-9-CM: 300.00  10/7/2021        Carpal tunnel syndrome of right wrist ICD-10-CM: G56.01  ICD-9-CM: 354.0  6/20/2021        Neuropathy ICD-10-CM: G62.9  ICD-9-CM: 355.9  6/20/2021        Chronic midline low back pain without sciatica ICD-10-CM: M54.50, G89.29  ICD-9-CM: 724.2, 338.29  6/20/2021        Physical deconditioning ICD-10-CM: R53.81  ICD-9-CM: 799.3  6/20/2021        Spinal stenosis of lumbar region with neurogenic claudication ICD-10-CM: M48.062  ICD-9-CM: 724.03  3/25/2021        Hx of colonoscopy ICD-10-CM: Z98.890  ICD-9-CM: V45.89  3/25/2021        Venous insufficiency ICD-10-CM: I87.2  ICD-9-CM: 459.81  7/30/2020        Pain in both lower extremities ICD-10-CM: M79.604, M79.605  ICD-9-CM: 729.5  9/26/2019        Family history of colon cancer ICD-10-CM: Z80.0  ICD-9-CM: V16.0  9/26/2019        Primary osteoarthritis ICD-10-CM: M19.91  ICD-9-CM: 715.10  9/26/2019        Palpitations ICD-10-CM: R00.2  ICD-9-CM: 785.1  6/4/2018    Overview Signed 6/4/2018  9:02 AM by Ronaldo Kern MD     3/18 echo normal lvef, mild mr and tr without pul htn  3/18 event monitor, nsr             Left atrial enlargement ICD-10-CM: I51.7  ICD-9-CM: 429.3  2/27/2018        Change in bowel habits ICD-10-CM: R19.4  ICD-9-CM: 787.99  3/30/2016        Obesity ICD-10-CM: E66.9  ICD-9-CM: 278.00  6/15/2015        Hypertension ICD-10-CM: I10  ICD-9-CM: 401.9  12/2/2014        Multinodular non-toxic goiter ICD-10-CM: E04.2  ICD-9-CM: 241.1  12/2/2014              Current Outpatient Medications on File Prior to Visit   Medication Sig    triamterene-hydroCHLOROthiazide (MAXZIDE) 37.5-25 mg per tablet TAKE 1 TABLET BY MOUTH EVERY DAY    COLLAGEN by Does Not Apply route.  aspirin 81 mg chewable tablet Take 81 mg by mouth daily.  Patient states she \"usually takes\" a baby aspirin daily    omega-3 fatty acids-vitamin e 1,000 mg cap Take 1 Cap by mouth.  OTHER \"tumeric\"    CALCIUM CARBONATE/VITAMIN D3 (CALCIUM + D PO) Take  by mouth daily.  multivitamin (ONE A DAY) tablet Take 1 Tab by mouth daily. No current facility-administered medications on file prior to visit. CARDIOLOGY STUDIES TO DATE:  3/18 echo normal lvef, mild mr and tr without pul htn  3/18 event monitor, nsr  6/20 rare pacs inconsistent associated with flutter, skipped beats. No sig tachy or roberto    Chief Complaint   Patient presents with    Follow-up     HPI :  She is doing well from a cardiac perspective with just very occasional palpitations but nothing sustained or worrisome. Her leg edema is fairly well controlled with support hose. I asked another of death since we met with her sister passed away and she is feeling that loss. She is not getting any regular exercise. She did go to [place] to be with her daughter and she felt really good when she was out there. Blood pressure is good today though was borderline when she last saw her primary care. CARDIAC ROS:   negative for chest pain, dyspnea, syncope, orthopnea, paroxysmal nocturnal dyspnea, exertional chest pressure/discomfort, claudication    Family History   Problem Relation Age of Onset    Cancer Mother         Colon CA    Hypertension Mother     Heart Disease Father         heart attack age 52, smoker    Hypertension Father     Cancer Sister         Colon CA    Hypertension Sister     Cancer Brother         Esophageal    Hypertension Brother     Cancer Brother         Multiple myeloma    Hypertension Brother     Heart Disease Brother     Hypertension Brother     Hypertension Brother        Past Medical History:   Diagnosis Date    Hypertension     Thyroid nodule     annual eval with US       GENERAL ROS:  A comprehensive review of systems was negative except for that written in the HPI.     Visit Vitals  /82 (BP 1 Location: Left upper arm, BP Patient Position: Sitting, BP Cuff Size: Adult)   Pulse 80   Resp 18   Ht 5' 5\" (1.651 m)   Wt 198 lb (89.8 kg)   SpO2 98%   BMI 32.95 kg/m²       Wt Readings from Last 3 Encounters:   02/16/22 198 lb (89.8 kg)   02/07/22 200 lb 1.6 oz (90.8 kg)   10/06/21 198 lb 14.4 oz (90.2 kg)            BP Readings from Last 3 Encounters:   02/16/22 136/82   02/07/22 (!) 144/86   10/07/21 136/75       PHYSICAL EXAM  General appearance: alert, cooperative, no distress, appears stated age  Neurologic: Alert and oriented X 3  Neck: supple, symmetrical, trachea midline, no adenopathy, no carotid bruit and no JVD  Lungs: clear to auscultation bilaterally  Heart: regular rate and rhythm, S1, S2 normal, no murmur, click, rub or gallop  Extremities: edema tr, compression stockings in place    Lab Results   Component Value Date/Time    Cholesterol, total 198 02/07/2022 12:53 PM    Cholesterol, total 191 02/15/2021 01:00 PM    Cholesterol, total 186 09/26/2019 11:39 AM    Cholesterol, total 184 11/05/2018 09:30 AM    Cholesterol, total 155 02/27/2018 10:30 AM    HDL Cholesterol 101 02/07/2022 12:53 PM    HDL Cholesterol 93 02/15/2021 01:00 PM    HDL Cholesterol 87 09/26/2019 11:39 AM    HDL Cholesterol 78 11/05/2018 09:30 AM    HDL Cholesterol 78 02/27/2018 10:30 AM    LDL, calculated 74.8 02/07/2022 12:53 PM    LDL, calculated 82 02/15/2021 01:00 PM    LDL, calculated 87 09/26/2019 11:39 AM    LDL, calculated 93 11/05/2018 09:30 AM    LDL, calculated 62 02/27/2018 10:30 AM    Triglyceride 111 02/07/2022 12:53 PM    Triglyceride 80 02/15/2021 01:00 PM    Triglyceride 61 09/26/2019 11:39 AM    Triglyceride 67 11/05/2018 09:30 AM    Triglyceride 75 02/27/2018 10:30 AM    CHOL/HDL Ratio 2.0 02/07/2022 12:53 PM    CHOL/HDL Ratio 2.1 02/15/2021 01:00 PM    CHOL/HDL Ratio 2.6 01/11/2010 10:53 AM    CHOL/HDL Ratio 2.3 02/02/2009 09:50 AM     ASSESSMENT :      She is stable and minimally symptomatic with palpitations on a good medical regimen and needs no cardiac testing at this time. I reassured her that the palpitations based on what we know about her were unlikely to lead to any serious consequences. current treatment plan is effective, no change in therapy  lab results and schedule of future lab studies reviewed with patient  reviewed diet, exercise and weight control    Encounter Diagnoses   Name Primary?  Primary hypertension Yes    Family history of premature CAD     Venous insufficiency      No orders of the defined types were placed in this encounter. Follow-up and Dispositions    · Return in about 6 months (around 8/16/2022). Bhavana Albert MD  2/16/2022  Please note that this dictation was completed with [employer] (formerly grad[employer]), the computer voice recognition software. Quite often unanticipated grammatical, syntax, homophones, and other interpretive errors are inadvertently transcribed by the computer software. Please disregard these errors. Please excuse any errors that have escaped final proofreading. Thank you.

## 2022-03-19 PROBLEM — G56.01 CARPAL TUNNEL SYNDROME OF RIGHT WRIST: Status: ACTIVE | Noted: 2021-06-20

## 2022-03-19 PROBLEM — F41.9 ANXIETY: Status: ACTIVE | Noted: 2021-10-07

## 2022-03-19 PROBLEM — R53.81 PHYSICAL DECONDITIONING: Status: ACTIVE | Noted: 2021-06-20

## 2022-03-19 PROBLEM — M48.062 SPINAL STENOSIS OF LUMBAR REGION WITH NEUROGENIC CLAUDICATION: Status: ACTIVE | Noted: 2021-03-25

## 2022-03-19 PROBLEM — M79.604 PAIN IN BOTH LOWER EXTREMITIES: Status: ACTIVE | Noted: 2019-09-26

## 2022-03-19 PROBLEM — M79.605 PAIN IN BOTH LOWER EXTREMITIES: Status: ACTIVE | Noted: 2019-09-26

## 2022-03-19 PROBLEM — Z98.890 HX OF COLONOSCOPY: Status: ACTIVE | Noted: 2021-03-25

## 2022-03-19 PROBLEM — Z80.0 FAMILY HISTORY OF COLON CANCER: Status: ACTIVE | Noted: 2019-09-26

## 2022-03-19 PROBLEM — M19.91 PRIMARY OSTEOARTHRITIS: Status: ACTIVE | Noted: 2019-09-26

## 2022-03-20 PROBLEM — R00.2 PALPITATIONS: Status: ACTIVE | Noted: 2018-06-04

## 2022-03-20 PROBLEM — I87.2 VENOUS INSUFFICIENCY: Status: ACTIVE | Noted: 2020-07-30

## 2022-03-20 PROBLEM — G89.29 CHRONIC MIDLINE LOW BACK PAIN WITHOUT SCIATICA: Status: ACTIVE | Noted: 2021-06-20

## 2022-03-20 PROBLEM — I51.7 LEFT ATRIAL ENLARGEMENT: Status: ACTIVE | Noted: 2018-02-27

## 2022-03-20 PROBLEM — M54.50 CHRONIC MIDLINE LOW BACK PAIN WITHOUT SCIATICA: Status: ACTIVE | Noted: 2021-06-20

## 2022-03-20 PROBLEM — G62.9 NEUROPATHY: Status: ACTIVE | Noted: 2021-06-20

## 2022-06-15 ENCOUNTER — OFFICE VISIT (OUTPATIENT)
Dept: INTERNAL MEDICINE CLINIC | Age: 72
End: 2022-06-15
Payer: MEDICARE

## 2022-06-15 VITALS
OXYGEN SATURATION: 99 % | RESPIRATION RATE: 16 BRPM | SYSTOLIC BLOOD PRESSURE: 139 MMHG | BODY MASS INDEX: 32.69 KG/M2 | TEMPERATURE: 98.2 F | HEART RATE: 79 BPM | HEIGHT: 65 IN | DIASTOLIC BLOOD PRESSURE: 84 MMHG | WEIGHT: 196.2 LBS

## 2022-06-15 DIAGNOSIS — I87.2 VENOUS INSUFFICIENCY: ICD-10-CM

## 2022-06-15 DIAGNOSIS — E04.2 MULTINODULAR NON-TOXIC GOITER: ICD-10-CM

## 2022-06-15 DIAGNOSIS — M17.0 PRIMARY OSTEOARTHRITIS OF BOTH KNEES: ICD-10-CM

## 2022-06-15 DIAGNOSIS — R79.89 PRERENAL AZOTEMIA: Primary | ICD-10-CM

## 2022-06-15 DIAGNOSIS — I10 PRIMARY HYPERTENSION: ICD-10-CM

## 2022-06-15 PROCEDURE — G8510 SCR DEP NEG, NO PLAN REQD: HCPCS | Performed by: INTERNAL MEDICINE

## 2022-06-15 PROCEDURE — G8754 DIAS BP LESS 90: HCPCS | Performed by: INTERNAL MEDICINE

## 2022-06-15 PROCEDURE — 1101F PT FALLS ASSESS-DOCD LE1/YR: CPT | Performed by: INTERNAL MEDICINE

## 2022-06-15 PROCEDURE — 3017F COLORECTAL CA SCREEN DOC REV: CPT | Performed by: INTERNAL MEDICINE

## 2022-06-15 PROCEDURE — G8536 NO DOC ELDER MAL SCRN: HCPCS | Performed by: INTERNAL MEDICINE

## 2022-06-15 PROCEDURE — G8752 SYS BP LESS 140: HCPCS | Performed by: INTERNAL MEDICINE

## 2022-06-15 PROCEDURE — G8427 DOCREV CUR MEDS BY ELIG CLIN: HCPCS | Performed by: INTERNAL MEDICINE

## 2022-06-15 PROCEDURE — 99214 OFFICE O/P EST MOD 30 MIN: CPT | Performed by: INTERNAL MEDICINE

## 2022-06-15 PROCEDURE — G9899 SCRN MAM PERF RSLTS DOC: HCPCS | Performed by: INTERNAL MEDICINE

## 2022-06-15 PROCEDURE — 1123F ACP DISCUSS/DSCN MKR DOCD: CPT | Performed by: INTERNAL MEDICINE

## 2022-06-15 PROCEDURE — 1090F PRES/ABSN URINE INCON ASSESS: CPT | Performed by: INTERNAL MEDICINE

## 2022-06-15 PROCEDURE — G8417 CALC BMI ABV UP PARAM F/U: HCPCS | Performed by: INTERNAL MEDICINE

## 2022-06-15 PROCEDURE — G8399 PT W/DXA RESULTS DOCUMENT: HCPCS | Performed by: INTERNAL MEDICINE

## 2022-06-15 NOTE — PROGRESS NOTES
Chief Complaint   Patient presents with    Hypertension     4 month follow up          1. Have you been to the ER, urgent care clinic since your last visit? Hospitalized since your last visit? No    2. Have you seen or consulted any other health care providers outside of the [address] [place] [address] since your last visit? Include any pap smears or colon screening.  No     Mammograph was completed on 2/15/22 at [facility]  Endocrinology on 3/1/22 at [facility] Endocrinology, fine needle biopsy  [facility] for shoulder and knee pain

## 2022-06-16 LAB
ANION GAP SERPL CALC-SCNC: 7 MMOL/L (ref 5–15)
BUN SERPL-MCNC: 24 MG/DL (ref 6–20)
BUN/CREAT SERPL: 24 (ref 12–20)
CALCIUM SERPL-MCNC: 9.8 MG/DL (ref 8.5–10.1)
CHLORIDE SERPL-SCNC: 102 MMOL/L (ref 97–108)
CO2 SERPL-SCNC: 30 MMOL/L (ref 21–32)
CREAT SERPL-MCNC: 1 MG/DL (ref 0.55–1.02)
GLUCOSE SERPL-MCNC: 89 MG/DL (ref 65–100)
POTASSIUM SERPL-SCNC: 4.2 MMOL/L (ref 3.5–5.1)
SODIUM SERPL-SCNC: 139 MMOL/L (ref 136–145)

## 2022-06-19 NOTE — PROGRESS NOTES
[address] [facility]  [place] [address]  [address] [facility] [address]  Phone:  [phone]  Fax: [phone]       Chief Complaint   Patient presents with    Hypertension     4 month follow up    . SUBJECTIVE:    Bautista Bunn is a 66 y.o. female comes in for return visit stating that she has done well. She is quite frustrated with where she lives because there is a irritating literally crazy person that is affecting the neighborhood. She actually has legal systems in this domain. She continues to have intermittent swelling of her lower extremities which is orthostatic in nature. She does have a history of venous insufficiency. She most had a needle biopsy of the thyroid, but the specimen was not adequate and it was suggested that she obtain another one and apparently the patient is in the process of getting this done. She has had intermittent pain in her knees, left somewhat greater than the right. She is getting physical therapy for this. She has not lost any meaningful weight since I last saw her. She has a past history of primary hypertension. Current Outpatient Medications   Medication Sig Dispense Refill    triamterene-hydroCHLOROthiazide (MAXZIDE) 37.5-25 mg per tablet TAKE 1 TABLET BY MOUTH EVERY DAY 90 Tablet 3    COLLAGEN by Does Not Apply route.  aspirin 81 mg chewable tablet Take 81 mg by mouth daily. Patient states she \"usually takes\" a baby aspirin daily      omega-3 fatty acids-vitamin e 1,000 mg cap Take 1 Cap by mouth.  OTHER \"tumeric\"      CALCIUM CARBONATE/VITAMIN D3 (CALCIUM + D PO) Take  by mouth daily.  multivitamin (ONE A DAY) tablet Take 1 Tab by mouth daily.        Past Medical History:   Diagnosis Date    Hypertension     Thyroid nodule     annual eval with US     Past Surgical History:   Procedure Laterality Date    HX COLONOSCOPY       No Known Allergies      REVIEW OF SYSTEMS:  General: negative for - chills or fever  ENT: negative for - headaches, nasal congestion or tinnitus  Respiratory: negative for - cough, hemoptysis, shortness of breath or wheezing  Cardiovascular : negative for - chest pain, edema, palpitations or shortness of breath  Gastrointestinal: negative for - abdominal pain, blood in stools, heartburn or nausea/vomiting  Genito-Urinary: no dysuria, trouble voiding, or hematuria  Musculoskeletal: negative for - gait disturbance, joint pain, joint stiffness or joint swelling  Neurological: no TIA or stroke symptoms  Hematologic: no bruises, no bleeding, no swollen glands  Integument: no lumps, mole changes, nail changes or rash  Endocrine: no malaise/lethargy or unexpected weight changes      Social History     Socioeconomic History    Marital status:     Number of children: 1    Years of education: 16+    Highest education level: Bachelor's degree (e.g., BA, AB, BS)   Occupational History    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   last year from pulmonary fibrosis.     Daughter lives in [facility]----has 2 children--- daughter and  are both      Family History   Problem Relation Age of Onset    Cancer Mother         Colon CA    Hypertension Mother     Heart Disease Father         heart attack age 52, smoker    Hypertension Father     Cancer Sister         Colon CA    Hypertension Sister     Cancer Brother         Esophageal    Hypertension Brother     Cancer Brother         Multiple myeloma    Hypertension Brother     Heart Disease Brother     Hypertension Brother     Hypertension Brother        OBJECTIVE:    Visit Vitals  /84   Pulse 79   Temp 98.2 °F (36.8 °C) (Oral)   Resp 16   Ht 5' 5\" (1.651 m)   Wt 196 lb 3.2 oz (89 kg)   SpO2 99%   BMI 32.65 kg/m²     CONSTITUTIONAL: well , well nourished, appears age appropriate  EYES: perrla, eom intact  ENMT:moist mucous membranes, pharynx clear  NECK: supple. Nodular goiter  RESPIRATORY: Chest: clear to ascultation and percussion   CARDIOVASCULAR: Heart: regular rate and rhythm  GASTROINTESTINAL: Abdomen: soft, bowel sounds active  HEMATOLOGIC: no pathological lymph nodes palpated  MUSCULOSKELETAL: Extremities: trace edema distally, pulse 1+, mild pain elicited to flexion hyperextension both knees left greater than right  INTEGUMENT: No unusual rashes or suspicious skin lesions noted. Nails appear normal.  NEUROLOGIC: non-focal exam   MENTAL STATUS: alert and oriented, appropriate affect      ASSESSMENT:  1. Prerenal azotemia    2. Venous insufficiency    3. Primary hypertension    4. Multinodular non-toxic goiter    5. Primary osteoarthritis of both knees        PLAN:  1. The patient has a pre-renal azotemia and I will repeat the BNP today. 2. She has venous insufficiency as the etiology of her episodic swelling which will indeed get worse during the hot humid months. She knows the treatment and the disease process. 3. Blood pressure is acceptable today, no adjustments are made. 4. She will follow up with her endocrinologist as it relates to her multinodular goiter. 5. Her osteoarthritic knees are doing reasonably well, but she follows up with an orthopedic physician and plans physical therapy. 6. She plans to go visit her daughter in [place] in the very near future. .  Orders Placed This Encounter    METABOLIC PANEL, BASIC         Follow-up and Dispositions    · Return in about 6 months (around 12/15/2022).            George Segundo MD

## 2022-08-15 ENCOUNTER — OFFICE VISIT (OUTPATIENT)
Dept: CARDIOLOGY CLINIC | Age: 72
End: 2022-08-15
Payer: MEDICARE

## 2022-08-15 VITALS
HEART RATE: 73 BPM | WEIGHT: 198 LBS | BODY MASS INDEX: 32.99 KG/M2 | HEIGHT: 65 IN | OXYGEN SATURATION: 99 % | RESPIRATION RATE: 16 BRPM | DIASTOLIC BLOOD PRESSURE: 80 MMHG | SYSTOLIC BLOOD PRESSURE: 128 MMHG

## 2022-08-15 DIAGNOSIS — I87.2 VENOUS INSUFFICIENCY: ICD-10-CM

## 2022-08-15 DIAGNOSIS — I49.3 PVC (PREMATURE VENTRICULAR CONTRACTION): ICD-10-CM

## 2022-08-15 DIAGNOSIS — I10 PRIMARY HYPERTENSION: Primary | ICD-10-CM

## 2022-08-15 PROCEDURE — G8432 DEP SCR NOT DOC, RNG: HCPCS | Performed by: SPECIALIST

## 2022-08-15 PROCEDURE — 1090F PRES/ABSN URINE INCON ASSESS: CPT | Performed by: SPECIALIST

## 2022-08-15 PROCEDURE — G8754 DIAS BP LESS 90: HCPCS | Performed by: SPECIALIST

## 2022-08-15 PROCEDURE — G8536 NO DOC ELDER MAL SCRN: HCPCS | Performed by: SPECIALIST

## 2022-08-15 PROCEDURE — G8399 PT W/DXA RESULTS DOCUMENT: HCPCS | Performed by: SPECIALIST

## 2022-08-15 PROCEDURE — G9899 SCRN MAM PERF RSLTS DOC: HCPCS | Performed by: SPECIALIST

## 2022-08-15 PROCEDURE — 3017F COLORECTAL CA SCREEN DOC REV: CPT | Performed by: SPECIALIST

## 2022-08-15 PROCEDURE — 99213 OFFICE O/P EST LOW 20 MIN: CPT | Performed by: SPECIALIST

## 2022-08-15 PROCEDURE — 1123F ACP DISCUSS/DSCN MKR DOCD: CPT | Performed by: SPECIALIST

## 2022-08-15 PROCEDURE — G8417 CALC BMI ABV UP PARAM F/U: HCPCS | Performed by: SPECIALIST

## 2022-08-15 PROCEDURE — 1101F PT FALLS ASSESS-DOCD LE1/YR: CPT | Performed by: SPECIALIST

## 2022-08-15 PROCEDURE — G8752 SYS BP LESS 140: HCPCS | Performed by: SPECIALIST

## 2022-08-15 PROCEDURE — G8427 DOCREV CUR MEDS BY ELIG CLIN: HCPCS | Performed by: SPECIALIST

## 2022-08-15 NOTE — PROGRESS NOTES
HISTORY OF PRESENT ILLNESS  Teha Bergman is a 67 y.o. female     SUMMARY:   Problem List  Date Reviewed: 8/15/2022            Codes Class Noted    Anxiety ICD-10-CM: F41.9  ICD-9-CM: 300.00  10/7/2021        Carpal tunnel syndrome of right wrist ICD-10-CM: G56.01  ICD-9-CM: 354.0  6/20/2021        Neuropathy ICD-10-CM: G62.9  ICD-9-CM: 355.9  6/20/2021        Chronic midline low back pain without sciatica ICD-10-CM: M54.50, G89.29  ICD-9-CM: 724.2, 338.29  6/20/2021        Physical deconditioning ICD-10-CM: R53.81  ICD-9-CM: 799.3  6/20/2021        Spinal stenosis of lumbar region with neurogenic claudication ICD-10-CM: M48.062  ICD-9-CM: 724.03  3/25/2021        Hx of colonoscopy ICD-10-CM: Z98.890  ICD-9-CM: V45.89  3/25/2021        Venous insufficiency ICD-10-CM: I87.2  ICD-9-CM: 459.81  7/30/2020        Pain in both lower extremities ICD-10-CM: M79.604, M79.605  ICD-9-CM: 729.5  9/26/2019        Family history of colon cancer ICD-10-CM: Z80.0  ICD-9-CM: V16.0  9/26/2019        Primary osteoarthritis ICD-10-CM: M19.91  ICD-9-CM: 715.10  9/26/2019        Palpitations ICD-10-CM: R00.2  ICD-9-CM: 785.1  6/4/2018    Overview Signed 6/4/2018  9:02 AM by Jair Ojeda MD     3/18 echo normal lvef, mild mr and tr without pul htn  3/18 event monitor, nsr             Left atrial enlargement ICD-10-CM: I51.7  ICD-9-CM: 429.3  2/27/2018        Change in bowel habits ICD-10-CM: R19.4  ICD-9-CM: 787.99  3/30/2016        Obesity ICD-10-CM: E66.9  ICD-9-CM: 278.00  6/15/2015        Hypertension ICD-10-CM: I10  ICD-9-CM: 401.9  12/2/2014        Multinodular non-toxic goiter ICD-10-CM: E04.2  ICD-9-CM: 241.1  12/2/2014           Current Outpatient Medications on File Prior to Visit   Medication Sig    triamterene-hydroCHLOROthiazide (MAXZIDE) 37.5-25 mg per tablet TAKE 1 TABLET BY MOUTH EVERY DAY    COLLAGEN by Does Not Apply route. aspirin 81 mg chewable tablet Take 81 mg by mouth daily.  Patient states she \"usually takes\" a baby aspirin daily    omega-3 fatty acids-vitamin e 1,000 mg cap Take 1 Cap by mouth. OTHER \"tumeric\"    CALCIUM CARBONATE/VITAMIN D3 (CALCIUM + D PO) Take  by mouth daily. multivitamin (ONE A DAY) tablet Take 1 Tab by mouth daily. No current facility-administered medications on file prior to visit. CARDIOLOGY STUDIES TO DATE:  3/18 echo normal lvef, mild mr and tr without pul htn  3/18 event monitor, nsr  6/20 rare pacs inconsistent associated with flutter, skipped beats. No sig tachy or roberto    Chief Complaint   Patient presents with    Follow-up     HPI :  Overall she is doing quite well from a cardiac standpoint. Blood pressures well controlled and she is not had much leg swelling recently. She is trying to exercise but she is limited by back and knee issues. Not a lot of recent palpitations either. There apparently is a crazy person in her neighborhood and is taking quite a bit of effort to get that situation under control. She is looking forward to going back to [place] next month to be with her daughter. CARDIAC ROS:   negative for chest pain, dyspnea, palpitations, syncope, orthopnea, paroxysmal nocturnal dyspnea, exertional chest pressure/discomfort, claudication    Family History   Problem Relation Age of Onset    Cancer Mother         Colon CA    Hypertension Mother     Heart Disease Father         heart attack age 52, smoker    Hypertension Father     Cancer Sister         Colon CA    Hypertension Sister     Cancer Brother         Esophageal    Hypertension Brother     Cancer Brother         Multiple myeloma    Hypertension Brother     Heart Disease Brother     Hypertension Brother     Hypertension Brother        Past Medical History:   Diagnosis Date    Hypertension     Thyroid nodule     annual eval with US       GENERAL ROS:  A comprehensive review of systems was negative except for that written in the HPI.     Visit Vitals  /80   Pulse 73   Resp 16 Ht 5' 5\" (1.651 m)   Wt 198 lb (89.8 kg)   SpO2 99%   BMI 32.95 kg/m²       Wt Readings from Last 3 Encounters:   08/15/22 198 lb (89.8 kg)   06/15/22 196 lb 3.2 oz (89 kg)   02/16/22 198 lb (89.8 kg)            BP Readings from Last 3 Encounters:   08/15/22 128/80   06/15/22 139/84   02/16/22 136/82       PHYSICAL EXAM  General appearance: alert, cooperative, no distress, appears stated age  Neurologic: Alert and oriented X 3  Neck: supple, symmetrical, trachea midline, no adenopathy, no carotid bruit, and no JVD  Lungs: clear to auscultation bilaterally  Heart: regular rate and rhythm, S1, S2 normal, no murmur, click, rub or gallop  Extremities: edema tr    Lab Results   Component Value Date/Time    Cholesterol, total 198 02/07/2022 12:53 PM    Cholesterol, total 191 02/15/2021 01:00 PM    Cholesterol, total 186 09/26/2019 11:39 AM    Cholesterol, total 184 11/05/2018 09:30 AM    Cholesterol, total 155 02/27/2018 10:30 AM    HDL Cholesterol 101 02/07/2022 12:53 PM    HDL Cholesterol 93 02/15/2021 01:00 PM    HDL Cholesterol 87 09/26/2019 11:39 AM    HDL Cholesterol 78 11/05/2018 09:30 AM    HDL Cholesterol 78 02/27/2018 10:30 AM    LDL, calculated 74.8 02/07/2022 12:53 PM    LDL, calculated 82 02/15/2021 01:00 PM    LDL, calculated 87 09/26/2019 11:39 AM    LDL, calculated 93 11/05/2018 09:30 AM    LDL, calculated 62 02/27/2018 10:30 AM    Triglyceride 111 02/07/2022 12:53 PM    Triglyceride 80 02/15/2021 01:00 PM    Triglyceride 61 09/26/2019 11:39 AM    Triglyceride 67 11/05/2018 09:30 AM    Triglyceride 75 02/27/2018 10:30 AM    CHOL/HDL Ratio 2.0 02/07/2022 12:53 PM    CHOL/HDL Ratio 2.1 02/15/2021 01:00 PM    CHOL/HDL Ratio 2.6 01/11/2010 10:53 AM    CHOL/HDL Ratio 2.3 02/02/2009 09:50 AM     ASSESSMENT :      She is stable and asymptomatic, well compensated on a good medical regimen and needs no cardiac testing this time.   current treatment plan is effective, no change in therapy  lab results and schedule of future lab studies reviewed with patient  reviewed diet, exercise and weight control    Encounter Diagnoses   Name Primary? Primary hypertension Yes    PVC (premature ventricular contraction)     Venous insufficiency      No orders of the defined types were placed in this encounter. Follow-up and Dispositions    Return in about 6 months (around 2/15/2023). Fatou Estrada MD  8/15/2022  Please note that this dictation was completed with [email or web address], the computer voice recognition software. Quite often unanticipated grammatical, syntax, homophones, and other interpretive errors are inadvertently transcribed by the computer software. Please disregard these errors. Please excuse any errors that have escaped final proofreading. Thank you.

## 2022-09-21 ENCOUNTER — OFFICE VISIT (OUTPATIENT)
Dept: INTERNAL MEDICINE CLINIC | Age: 72
End: 2022-09-21
Payer: MEDICARE

## 2022-09-21 VITALS
BODY MASS INDEX: 32.8 KG/M2 | HEIGHT: 65 IN | TEMPERATURE: 98.4 F | DIASTOLIC BLOOD PRESSURE: 73 MMHG | HEART RATE: 84 BPM | WEIGHT: 196.9 LBS | SYSTOLIC BLOOD PRESSURE: 112 MMHG | OXYGEN SATURATION: 100 % | RESPIRATION RATE: 16 BRPM

## 2022-09-21 DIAGNOSIS — M48.062 SPINAL STENOSIS OF LUMBAR REGION WITH NEUROGENIC CLAUDICATION: Primary | ICD-10-CM

## 2022-09-21 DIAGNOSIS — R79.89 PRERENAL AZOTEMIA: ICD-10-CM

## 2022-09-21 DIAGNOSIS — E66.9 CLASS 1 OBESITY WITHOUT SERIOUS COMORBIDITY WITH BODY MASS INDEX (BMI) OF 34.0 TO 34.9 IN ADULT, UNSPECIFIED OBESITY TYPE: ICD-10-CM

## 2022-09-21 DIAGNOSIS — M17.12 PRIMARY OSTEOARTHRITIS OF LEFT KNEE: ICD-10-CM

## 2022-09-21 DIAGNOSIS — I10 PRIMARY HYPERTENSION: ICD-10-CM

## 2022-09-21 PROCEDURE — G8536 NO DOC ELDER MAL SCRN: HCPCS | Performed by: INTERNAL MEDICINE

## 2022-09-21 PROCEDURE — 1101F PT FALLS ASSESS-DOCD LE1/YR: CPT | Performed by: INTERNAL MEDICINE

## 2022-09-21 PROCEDURE — G8752 SYS BP LESS 140: HCPCS | Performed by: INTERNAL MEDICINE

## 2022-09-21 PROCEDURE — 3017F COLORECTAL CA SCREEN DOC REV: CPT | Performed by: INTERNAL MEDICINE

## 2022-09-21 PROCEDURE — 1123F ACP DISCUSS/DSCN MKR DOCD: CPT | Performed by: INTERNAL MEDICINE

## 2022-09-21 PROCEDURE — G8754 DIAS BP LESS 90: HCPCS | Performed by: INTERNAL MEDICINE

## 2022-09-21 PROCEDURE — G8510 SCR DEP NEG, NO PLAN REQD: HCPCS | Performed by: INTERNAL MEDICINE

## 2022-09-21 PROCEDURE — G8427 DOCREV CUR MEDS BY ELIG CLIN: HCPCS | Performed by: INTERNAL MEDICINE

## 2022-09-21 PROCEDURE — G9899 SCRN MAM PERF RSLTS DOC: HCPCS | Performed by: INTERNAL MEDICINE

## 2022-09-21 PROCEDURE — G8399 PT W/DXA RESULTS DOCUMENT: HCPCS | Performed by: INTERNAL MEDICINE

## 2022-09-21 PROCEDURE — 99214 OFFICE O/P EST MOD 30 MIN: CPT | Performed by: INTERNAL MEDICINE

## 2022-09-21 PROCEDURE — G8417 CALC BMI ABV UP PARAM F/U: HCPCS | Performed by: INTERNAL MEDICINE

## 2022-09-21 PROCEDURE — 1090F PRES/ABSN URINE INCON ASSESS: CPT | Performed by: INTERNAL MEDICINE

## 2022-09-21 RX ORDER — MELOXICAM 15 MG/1
15 TABLET ORAL DAILY
Qty: 30 TABLET | Refills: 3 | Status: SHIPPED | OUTPATIENT
Start: 2022-09-21

## 2022-09-21 RX ORDER — PREDNISONE 20 MG/1
80 TABLET ORAL
Qty: 20 TABLET | Refills: 0 | Status: SHIPPED | OUTPATIENT
Start: 2022-09-21

## 2022-09-21 NOTE — PROGRESS NOTES
[address] [facility]  [place] [address]  [address] [facility] [address]  Phone:  [phone]  Fax: [phone]       Chief Complaint   Patient presents with    Pain (Chronic)     Patient here for multiple joint pain. Pain level 7.   .      SUBJECTIVE:    Matthew Narayan is a 67 y.o. female comes in for return visit stating that she has not done well. She has a lot of personal problems related to her home and an irate neighbor. This has been going on for years. Her chief complaint as it relates to her today is the pain in her low back area periodically radiating down the lateral aspect of both legs. This has been present for the last several weeks and is gradually getting worse. She also has pain in her left knee aggravated with prolonged walking. Several months ago, she was seen by the orthopedic physician who injected her knee and there was no improvement whatsoever. She has had no meaningful weight loss since I last saw her. She has a history of chronic venous insufficiency leading to orthostatic swelling of her lower extremities. Finally, she does have a history of primary hypertension which is often times creating a problem with her pre-renal azotemia. Current Outpatient Medications   Medication Sig Dispense Refill    predniSONE (DELTASONE) 20 mg tablet Take 4 Tablets by mouth daily (after dinner). 20 Tablet 0    meloxicam (MOBIC) 15 mg tablet Take 1 Tablet by mouth daily. 30 Tablet 3    triamterene-hydroCHLOROthiazide (MAXZIDE) 37.5-25 mg per tablet TAKE 1 TABLET BY MOUTH EVERY DAY 90 Tablet 3    COLLAGEN by Does Not Apply route. aspirin 81 mg chewable tablet Take 81 mg by mouth daily. Patient states she \"usually takes\" a baby aspirin daily      omega-3 fatty acids-vitamin e 1,000 mg cap Take 1 Cap by mouth. OTHER \"tumeric\"      CALCIUM CARBONATE/VITAMIN D3 (CALCIUM + D PO) Take  by mouth daily.       multivitamin (ONE A DAY) tablet Take 1 Tab by mouth daily. Past Medical History:   Diagnosis Date    Hypertension     Thyroid nodule     annual eval with US     Past Surgical History:   Procedure Laterality Date    HX COLONOSCOPY       No Known Allergies      REVIEW OF SYSTEMS:  General: negative for - chills or fever  ENT: negative for - headaches, nasal congestion or tinnitus  Respiratory: negative for - cough, hemoptysis, shortness of breath or wheezing  Cardiovascular : negative for - chest pain, edema, palpitations or shortness of breath  Gastrointestinal: negative for - abdominal pain, blood in stools, heartburn or nausea/vomiting  Genito-Urinary: no dysuria, trouble voiding, or hematuria  Musculoskeletal: negative for - gait disturbance, joint pain, joint stiffness or joint swelling  Neurological: no TIA or stroke symptoms  Hematologic: no bruises, no bleeding, no swollen glands  Integument: no lumps, mole changes, nail changes or rash  Endocrine: no malaise/lethargy or unexpected weight changes      Social History     Socioeconomic History    Marital status:     Number of children: 1    Years of education: 16+    Highest education level: Bachelor's degree (e.g., BA, AB, BS)   Occupational History    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   last year from pulmonary fibrosis.     Daughter lives in [facility]----has 2 children--- daughter and  are both      Family History   Problem Relation Age of Onset    Cancer Mother         Colon CA    Hypertension Mother     Heart Disease Father         heart attack age 52, smoker    Hypertension Father     Cancer Sister         Colon CA    Hypertension Sister     Cancer Brother         Esophageal    Hypertension Brother     Cancer Brother         Multiple myeloma    Hypertension Brother     Heart Disease Brother     Hypertension Brother     Hypertension Brother        OBJECTIVE:    Visit Vitals  /73   Pulse 84   Temp 98.4 °F (36.9 °C) (Oral)   Resp 16   Ht 5' 5\" (1.651 m)   Wt 196 lb 14.4 oz (89.3 kg)   SpO2 100%   BMI 32.77 kg/m²     CONSTITUTIONAL: well , well nourished, appears age appropriate  EYES: perrla, eom intact  ENMT:moist mucous membranes, pharynx clear  NECK: supple. Thyroid normal  RESPIRATORY: Chest: clear to ascultation and percussion   CARDIOVASCULAR: Heart: regular rate and rhythm  GASTROINTESTINAL: Abdomen: soft, bowel sounds active  HEMATOLOGIC: no pathological lymph nodes palpated  MUSCULOSKELETAL: Extremities: no edema, pulse 1+   INTEGUMENT: No unusual rashes or suspicious skin lesions noted. Nails appear normal.  NEUROLOGIC: non-focal exam   MENTAL STATUS: alert and oriented, appropriate affect      ASSESSMENT:  1. Spinal stenosis of lumbar region with neurogenic claudication    2. Primary osteoarthritis of left knee    3. Class 1 obesity without serious comorbidity with body mass index (BMI) of 34.0 to 34.9 in adult, unspecified obesity type    4. Prerenal azotemia    5. Primary hypertension        PLAN:  1. For the symptomatic lumbar spinal stenosis complicated by radiculopathy I will give her prednisone 80 mg p.c. dinner for the next five days. This will be followed by meloxicam 15 mg q.d. The latter should also have a positive impact on her osteoarthritic left knee. 2. She needs to lose weight. This can be addressed by eating meals, eliminating snacks, and avoiding the consumption of processed carbohydrates. 3. I am concerned about her pre-renal azotemia particularly since I am going to place her on NSAID.   4. Blood pressure is excellent, no adjustments are made. Orders Placed This Encounter    METABOLIC PANEL, BASIC    predniSONE (DELTASONE) 20 mg tablet    meloxicam (MOBIC) 15 mg tablet         Follow-up and Dispositions    Return in about 6 months (around 3/21/2023).            Reji Pearce MD

## 2022-09-21 NOTE — PROGRESS NOTES
Alec Rodgers is a 67 y.o. female  Chief Complaint   Patient presents with    Pain (Chronic)     Patient here for multiple joint pain. Pain level 7.     1. Have you been to the ER, urgent care clinic since your last visit? Hospitalized since your last visit? No    2. Have you seen or consulted any other health care providers outside of the [address] [place] [address] since your last visit? Include any pap smears or colon screening.  No

## 2022-09-22 LAB
ANION GAP SERPL CALC-SCNC: 5 MMOL/L (ref 5–15)
BUN SERPL-MCNC: 18 MG/DL (ref 6–20)
BUN/CREAT SERPL: 17 (ref 12–20)
CALCIUM SERPL-MCNC: 9.4 MG/DL (ref 8.5–10.1)
CHLORIDE SERPL-SCNC: 105 MMOL/L (ref 97–108)
CO2 SERPL-SCNC: 29 MMOL/L (ref 21–32)
CREAT SERPL-MCNC: 1.07 MG/DL (ref 0.55–1.02)
GLUCOSE SERPL-MCNC: 73 MG/DL (ref 65–100)
POTASSIUM SERPL-SCNC: 4.3 MMOL/L (ref 3.5–5.1)
SODIUM SERPL-SCNC: 139 MMOL/L (ref 136–145)

## 2022-10-25 RX ORDER — TRIAMTERENE/HYDROCHLOROTHIAZID 37.5-25 MG
TABLET ORAL
Qty: 90 TABLET | Refills: 3 | Status: SHIPPED | OUTPATIENT
Start: 2022-10-25

## 2023-01-19 ENCOUNTER — OFFICE VISIT (OUTPATIENT)
Dept: INTERNAL MEDICINE CLINIC | Age: 73
End: 2023-01-19
Payer: MEDICARE

## 2023-01-19 VITALS
SYSTOLIC BLOOD PRESSURE: 131 MMHG | BODY MASS INDEX: 33.72 KG/M2 | HEIGHT: 65 IN | OXYGEN SATURATION: 100 % | TEMPERATURE: 98 F | DIASTOLIC BLOOD PRESSURE: 70 MMHG | WEIGHT: 202.4 LBS | HEART RATE: 83 BPM | RESPIRATION RATE: 20 BRPM

## 2023-01-19 DIAGNOSIS — R79.89 PRERENAL AZOTEMIA: ICD-10-CM

## 2023-01-19 DIAGNOSIS — E04.2 MULTINODULAR NON-TOXIC GOITER: ICD-10-CM

## 2023-01-19 DIAGNOSIS — I10 PRIMARY HYPERTENSION: Primary | ICD-10-CM

## 2023-01-19 DIAGNOSIS — M17.12 PRIMARY OSTEOARTHRITIS OF LEFT KNEE: ICD-10-CM

## 2023-01-19 DIAGNOSIS — E66.9 CLASS 1 OBESITY WITHOUT SERIOUS COMORBIDITY WITH BODY MASS INDEX (BMI) OF 34.0 TO 34.9 IN ADULT, UNSPECIFIED OBESITY TYPE: ICD-10-CM

## 2023-01-19 DIAGNOSIS — I87.2 VENOUS INSUFFICIENCY: ICD-10-CM

## 2023-01-19 NOTE — PROGRESS NOTES
Myron Adams is a 67 y.o. female    Chief Complaint   Patient presents with    Bladder Infection     1. Have you been to the ER, urgent care clinic since your last visit? Hospitalized since your last visit? No    2. Have you seen or consulted any other health care providers outside of the [address] [place] [address] since your last visit? Include any pap smears or colon screening.  No

## 2023-01-21 RX ORDER — FLUCONAZOLE 150 MG/1
150 TABLET ORAL DAILY
Qty: 1 TABLET | Refills: 2 | Status: SHIPPED | OUTPATIENT
Start: 2023-01-21 | End: 2023-01-22

## 2023-01-21 RX ORDER — KETOCONAZOLE 20 MG/G
CREAM TOPICAL
Qty: 30 G | Refills: 1 | Status: SHIPPED | OUTPATIENT
Start: 2023-01-21

## 2023-01-21 NOTE — PROGRESS NOTES
[address] [place] [address] and Primary Care  [place] [address]  [address] [place] [address]  Phone:  [phone]  Fax: [phone]       Chief Complaint   Patient presents with    Bladder Infection   . SUBJECTIVE:    Emmy Mack is a 67 y.o. female  Dictation on: 01/21/2023  3:32 PM by: Andrés Mccullough [[id number]]          Current Outpatient Medications   Medication Sig Dispense Refill    fluconazole (DIFLUCAN) 150 mg tablet Take 1 Tablet by mouth daily for 1 day. FDA advises cautious prescribing of oral fluconazole in pregnancy. 1 Tablet 2    ketoconazole (NIZORAL) 2 % topical cream Apply locally twice daily 30 g 1    triamterene-hydroCHLOROthiazide (MAXZIDE) 37.5-25 mg per tablet TAKE 1 TABLET BY MOUTH EVERY DAY 90 Tablet 3    predniSONE (DELTASONE) 20 mg tablet Take 4 Tablets by mouth daily (after dinner). 20 Tablet 0    meloxicam (MOBIC) 15 mg tablet Take 1 Tablet by mouth daily. 30 Tablet 3    COLLAGEN by Does Not Apply route. aspirin 81 mg chewable tablet Take 81 mg by mouth daily. Patient states she \"usually takes\" a baby aspirin daily      omega-3 fatty acids-vitamin e 1,000 mg cap Take 1 Cap by mouth. OTHER \"tumeric\"      CALCIUM CARBONATE/VITAMIN D3 (CALCIUM + D PO) Take  by mouth daily. multivitamin (ONE A DAY) tablet Take 1 Tab by mouth daily.        Past Medical History:   Diagnosis Date    Hypertension     Thyroid nodule     annual eval with US     Past Surgical History:   Procedure Laterality Date    HX COLONOSCOPY       No Known Allergies      REVIEW OF SYSTEMS:  General: negative for - chills or fever  ENT: negative for - headaches, nasal congestion or tinnitus  Respiratory: negative for - cough, hemoptysis, shortness of breath or wheezing  Cardiovascular : negative for - chest pain, edema, palpitations or shortness of breath  Gastrointestinal: negative for - abdominal pain, blood in stools, heartburn or nausea/vomiting  Genito-Urinary: no dysuria, trouble voiding, or hematuria  Musculoskeletal: negative for - gait disturbance, joint pain, joint stiffness or joint swelling  Neurological: no TIA or stroke symptoms  Hematologic: no bruises, no bleeding, no swollen glands  Integument: no lumps, mole changes, nail changes or rash  Endocrine: no malaise/lethargy or unexpected weight changes      Social History     Socioeconomic History    Marital status:     Number of children: 1    Years of education: 16+    Highest education level: Bachelor's degree (e.g., BA, AB, BS)   Occupational History    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   last year from pulmonary fibrosis.     Daughter lives in [facility]----has 2 children--- daughter and  are both      Family History   Problem Relation Age of Onset    Cancer Mother         Colon CA    Hypertension Mother     Heart Disease Father         heart attack age 52, smoker    Hypertension Father     Cancer Sister         Colon CA    Hypertension Sister     Cancer Brother         Esophageal    Hypertension Brother     Cancer Brother         Multiple myeloma    Hypertension Brother     Heart Disease Brother     Hypertension Brother     Hypertension Brother        OBJECTIVE:    Visit Vitals  /70 (BP 1 Location: Left upper arm, BP Patient Position: Sitting)   Pulse 83   Temp 98 °F (36.7 °C) (Oral)   Resp 20   Ht 5' 5\" (1.651 m)   Wt 202 lb 6.4 oz (91.8 kg)   SpO2 100%   BMI 33.68 kg/m²     CONSTITUTIONAL: well , well nourished, appears age appropriate  EYES: perrla, eom intact  ENMT:moist mucous membranes, pharynx clear  NECK: supple. Thyroid normal  RESPIRATORY: Chest: clear to ascultation and percussion   CARDIOVASCULAR: Heart: regular rate and rhythm  GASTROINTESTINAL: Abdomen: soft, bowel sounds active  HEMATOLOGIC: no pathological lymph nodes palpated  MUSCULOSKELETAL: Extremities: no edema, pulse 1+   INTEGUMENT: No unusual rashes or suspicious skin lesions noted. Nails appear normal.  NEUROLOGIC: non-focal exam   MENTAL STATUS: alert and oriented, appropriate affect      ASSESSMENT:  1. Primary hypertension    2. Multinodular non-toxic goiter    3. Venous insufficiency    4. Primary osteoarthritis of left knee    5. Class 1 obesity without serious comorbidity with body mass index (BMI) of 34.0 to 34.9 in adult, unspecified obesity type    6. Prerenal azotemia        PLAN:   Dictation on: 01/21/2023  3:34 PM by: Gerson Goldsmith [[id number]]     Orders Placed This Encounter    METABOLIC PANEL, BASIC    fluconazole (DIFLUCAN) 150 mg tablet    ketoconazole (NIZORAL) 2 % topical cream         Follow-up and Dispositions    Return in about 6 months (around 7/19/2023).            Rhett Hall MD

## 2023-02-24 ENCOUNTER — OFFICE VISIT (OUTPATIENT)
Dept: CARDIOLOGY CLINIC | Age: 73
End: 2023-02-24
Payer: MEDICARE

## 2023-02-24 VITALS
BODY MASS INDEX: 32.82 KG/M2 | HEIGHT: 65 IN | HEART RATE: 88 BPM | SYSTOLIC BLOOD PRESSURE: 128 MMHG | DIASTOLIC BLOOD PRESSURE: 80 MMHG | WEIGHT: 197 LBS | RESPIRATION RATE: 16 BRPM | OXYGEN SATURATION: 98 %

## 2023-02-24 DIAGNOSIS — R00.2 PALPITATIONS: ICD-10-CM

## 2023-02-24 DIAGNOSIS — I10 PRIMARY HYPERTENSION: Primary | ICD-10-CM

## 2023-02-24 DIAGNOSIS — F41.9 ANXIETY: ICD-10-CM

## 2023-02-24 DIAGNOSIS — I49.3 PVC (PREMATURE VENTRICULAR CONTRACTION): ICD-10-CM

## 2023-02-24 RX ORDER — NYSTATIN AND TRIAMCINOLONE ACETONIDE 100000; 1 [USP'U]/G; MG/G
OINTMENT TOPICAL 2 TIMES DAILY
COMMUNITY

## 2023-02-24 NOTE — PROGRESS NOTES
HISTORY OF PRESENT ILLNESS  Pelon Tariq is a 67 y.o. female     SUMMARY:   Problem List  Date Reviewed: 2/24/2023            Codes Class Noted    Primary osteoarthritis of left knee ICD-10-CM: M17.12  ICD-9-CM: 715.16  9/21/2022        Prerenal azotemia ICD-10-CM: R79.89  ICD-9-CM: 790.6  9/21/2022        Anxiety ICD-10-CM: F41.9  ICD-9-CM: 300.00  10/7/2021        Carpal tunnel syndrome of right wrist ICD-10-CM: G56.01  ICD-9-CM: 354.0  6/20/2021        Neuropathy ICD-10-CM: G62.9  ICD-9-CM: 355.9  6/20/2021        Chronic midline low back pain without sciatica ICD-10-CM: M54.50, G89.29  ICD-9-CM: 724.2, 338.29  6/20/2021        Physical deconditioning ICD-10-CM: R53.81  ICD-9-CM: 799.3  6/20/2021        Spinal stenosis of lumbar region with neurogenic claudication ICD-10-CM: M48.062  ICD-9-CM: 724.03  3/25/2021        Hx of colonoscopy ICD-10-CM: Z98.890  ICD-9-CM: V45.89  3/25/2021        Venous insufficiency ICD-10-CM: I87.2  ICD-9-CM: 459.81  7/30/2020        Pain in both lower extremities ICD-10-CM: M79.604, M79.605  ICD-9-CM: 729.5  9/26/2019        Family history of colon cancer ICD-10-CM: Z80.0  ICD-9-CM: V16.0  9/26/2019        Primary osteoarthritis ICD-10-CM: M19.91  ICD-9-CM: 715.10  9/26/2019        Palpitations ICD-10-CM: R00.2  ICD-9-CM: 785.1  6/4/2018    Overview Signed 6/4/2018  9:02 AM by Melissa White MD     3/18 echo normal lvef, mild mr and tr without pul htn  3/18 event monitor, nsr             Left atrial enlargement ICD-10-CM: I51.7  ICD-9-CM: 429.3  2/27/2018        Change in bowel habits ICD-10-CM: R19.4  ICD-9-CM: 787.99  3/30/2016        Obesity ICD-10-CM: E66.9  ICD-9-CM: 278.00  6/15/2015        Hypertension ICD-10-CM: I10  ICD-9-CM: 401.9  12/2/2014        Multinodular non-toxic goiter ICD-10-CM: E04.2  ICD-9-CM: 241.1  12/2/2014           Current Outpatient Medications on File Prior to Visit   Medication Sig    nystatin-triamcinolone (MYCOLOG) 100,000-0.1 unit/gram-% ointment Apply  to affected area two (2) times a day. triamterene-hydroCHLOROthiazide (MAXZIDE) 37.5-25 mg per tablet TAKE 1 TABLET BY MOUTH EVERY DAY    meloxicam (MOBIC) 15 mg tablet Take 1 Tablet by mouth daily. COLLAGEN by Does Not Apply route. omega-3 fatty acids-vitamin e 1,000 mg cap Take 1 Cap by mouth. OTHER \"tumeric\"    CALCIUM CARBONATE/VITAMIN D3 (CALCIUM + D PO) Take  by mouth daily. multivitamin (ONE A DAY) tablet Take 1 Tab by mouth daily. No current facility-administered medications on file prior to visit. CARDIOLOGY STUDIES TO DATE:  3/18 echo normal lvef, mild mr and tr without pul htn  3/18 event monitor, nsr  6/20 rare pacs inconsistent associated with flutter, skipped beats. No sig tachy or roberto    Chief Complaint   Patient presents with    Follow-up     HPI :  Cardiac wise she is doing quite well and the only real symptom she has is occasionally when is quiet in the evening she will feel some palpitations. She still having trouble getting into any sort of regular exercise routine because of the number of different orthopedic issues mostly her left knee. The crazy lady that was wandering through the the neighborhood is disappeared so that is been a bit of relief and she is planning on going back to [place] in a couple of months to see her daughter.   CARDIAC ROS:   negative for chest pain, dyspnea, syncope, orthopnea, paroxysmal nocturnal dyspnea, exertional chest pressure/discomfort, claudication, lower extremity edema    Family History   Problem Relation Age of Onset    Cancer Mother         Colon CA    Hypertension Mother     Heart Disease Father         heart attack age 52, smoker    Hypertension Father     Cancer Sister         Colon CA    Hypertension Sister     Cancer Brother         Esophageal    Hypertension Brother     Cancer Brother         Multiple myeloma    Hypertension Brother     Heart Disease Brother     Hypertension Brother Hypertension Brother        Past Medical History:   Diagnosis Date    Hypertension     Thyroid nodule     annual eval with US       GENERAL ROS:  A comprehensive review of systems was negative except for that written in the HPI.     Visit Vitals  /80   Pulse 88   Resp 16   Ht 5' 5\" (1.651 m)   Wt 197 lb (89.4 kg)   SpO2 98%   BMI 32.78 kg/m²       Wt Readings from Last 3 Encounters:   02/24/23 197 lb (89.4 kg)   01/19/23 202 lb 6.4 oz (91.8 kg)   09/21/22 196 lb 14.4 oz (89.3 kg)            BP Readings from Last 3 Encounters:   02/24/23 128/80   01/19/23 131/70   09/21/22 112/73       PHYSICAL EXAM  General appearance: alert, cooperative, no distress, appears stated age  Neurologic: Alert and oriented X 3  Neck: supple, symmetrical, trachea midline, no adenopathy, no carotid bruit, and no JVD  Lungs: clear to auscultation bilaterally  Heart: regular rate and rhythm, S1, S2 normal, no murmur, click, rub or gallop  Extremities: extremities normal, atraumatic, no cyanosis or edema    Lab Results   Component Value Date/Time    Cholesterol, total 198 02/07/2022 12:53 PM    Cholesterol, total 191 02/15/2021 01:00 PM    Cholesterol, total 186 09/26/2019 11:39 AM    Cholesterol, total 184 11/05/2018 09:30 AM    Cholesterol, total 155 02/27/2018 10:30 AM    HDL Cholesterol 101 02/07/2022 12:53 PM    HDL Cholesterol 93 02/15/2021 01:00 PM    HDL Cholesterol 87 09/26/2019 11:39 AM    HDL Cholesterol 78 11/05/2018 09:30 AM    HDL Cholesterol 78 02/27/2018 10:30 AM    LDL, calculated 74.8 02/07/2022 12:53 PM    LDL, calculated 82 02/15/2021 01:00 PM    LDL, calculated 87 09/26/2019 11:39 AM    LDL, calculated 93 11/05/2018 09:30 AM    LDL, calculated 62 02/27/2018 10:30 AM    Triglyceride 111 02/07/2022 12:53 PM    Triglyceride 80 02/15/2021 01:00 PM    Triglyceride 61 09/26/2019 11:39 AM    Triglyceride 67 11/05/2018 09:30 AM    Triglyceride 75 02/27/2018 10:30 AM    CHOL/HDL Ratio 2.0 02/07/2022 12:53 PM    CHOL/HDL Ratio 2.1 02/15/2021 01:00 PM    CHOL/HDL Ratio 2.6 01/11/2010 10:53 AM    CHOL/HDL Ratio 2.3 02/02/2009 09:50 AM     ASSESSMENT :      She is stable and minimally symptomatic from a cardiac standpoint with no worrisome symptoms on a reasonable medical regimen, and needs no further cardiac testing at this time. current treatment plan is effective, no change in therapy  lab results and schedule of future lab studies reviewed with patient  reviewed diet, exercise and weight control    Encounter Diagnoses   Name Primary? Primary hypertension Yes    PVC (premature ventricular contraction)     Palpitations     Anxiety      Orders Placed This Encounter    nystatin-triamcinolone (MYCOLOG) 100,000-0.1 unit/gram-% ointment       Follow-up and Dispositions    Return in about 6 months (around 8/24/2023). Francisca Hardy MD  2/24/2023  Please note that this dictation was completed with Right90, the computer voice recognition software. Quite often unanticipated grammatical, syntax, homophones, and other interpretive errors are inadvertently transcribed by the computer software. Please disregard these errors. Please excuse any errors that have escaped final proofreading. Thank you.

## 2023-07-25 ENCOUNTER — OFFICE VISIT (OUTPATIENT)
Facility: CLINIC | Age: 73
End: 2023-07-25

## 2023-07-25 VITALS
HEIGHT: 65 IN | OXYGEN SATURATION: 99 % | SYSTOLIC BLOOD PRESSURE: 143 MMHG | DIASTOLIC BLOOD PRESSURE: 83 MMHG | TEMPERATURE: 98.2 F | BODY MASS INDEX: 32.39 KG/M2 | HEART RATE: 84 BPM | RESPIRATION RATE: 16 BRPM | WEIGHT: 194.4 LBS

## 2023-07-25 DIAGNOSIS — F41.9 ANXIETY: ICD-10-CM

## 2023-07-25 DIAGNOSIS — R53.81 PHYSICAL DECONDITIONING: ICD-10-CM

## 2023-07-25 DIAGNOSIS — Z00.00 MEDICARE ANNUAL WELLNESS VISIT, SUBSEQUENT: ICD-10-CM

## 2023-07-25 DIAGNOSIS — E78.5 DYSLIPIDEMIA: ICD-10-CM

## 2023-07-25 DIAGNOSIS — I87.2 VENOUS INSUFFICIENCY: Primary | ICD-10-CM

## 2023-07-25 DIAGNOSIS — I10 PRIMARY HYPERTENSION: ICD-10-CM

## 2023-07-25 DIAGNOSIS — E04.2 MULTINODULAR NON-TOXIC GOITER: ICD-10-CM

## 2023-07-25 DIAGNOSIS — M48.062 SPINAL STENOSIS OF LUMBAR REGION WITH NEUROGENIC CLAUDICATION: ICD-10-CM

## 2023-07-25 SDOH — ECONOMIC STABILITY: FOOD INSECURITY: WITHIN THE PAST 12 MONTHS, THE FOOD YOU BOUGHT JUST DIDN'T LAST AND YOU DIDN'T HAVE MONEY TO GET MORE.: NEVER TRUE

## 2023-07-25 SDOH — HEALTH STABILITY: PHYSICAL HEALTH: ON AVERAGE, HOW MANY DAYS PER WEEK DO YOU ENGAGE IN MODERATE TO STRENUOUS EXERCISE (LIKE A BRISK WALK)?: 0 DAYS

## 2023-07-25 SDOH — ECONOMIC STABILITY: TRANSPORTATION INSECURITY
IN THE PAST 12 MONTHS, HAS LACK OF TRANSPORTATION KEPT YOU FROM MEETINGS, WORK, OR FROM GETTING THINGS NEEDED FOR DAILY LIVING?: NO

## 2023-07-25 SDOH — ECONOMIC STABILITY: HOUSING INSECURITY
IN THE LAST 12 MONTHS, WAS THERE A TIME WHEN YOU DID NOT HAVE A STEADY PLACE TO SLEEP OR SLEPT IN A SHELTER (INCLUDING NOW)?: NO

## 2023-07-25 SDOH — ECONOMIC STABILITY: TRANSPORTATION INSECURITY
IN THE PAST 12 MONTHS, HAS THE LACK OF TRANSPORTATION KEPT YOU FROM MEDICAL APPOINTMENTS OR FROM GETTING MEDICATIONS?: NO

## 2023-07-25 SDOH — ECONOMIC STABILITY: FOOD INSECURITY: WITHIN THE PAST 12 MONTHS, YOU WORRIED THAT YOUR FOOD WOULD RUN OUT BEFORE YOU GOT MONEY TO BUY MORE.: NEVER TRUE

## 2023-07-25 SDOH — ECONOMIC STABILITY: HOUSING INSECURITY: IN THE LAST 12 MONTHS, HOW MANY PLACES HAVE YOU LIVED?: 1

## 2023-07-25 SDOH — ECONOMIC STABILITY: INCOME INSECURITY: IN THE LAST 12 MONTHS, WAS THERE A TIME WHEN YOU WERE NOT ABLE TO PAY THE MORTGAGE OR RENT ON TIME?: NO

## 2023-07-25 SDOH — HEALTH STABILITY: PHYSICAL HEALTH: ON AVERAGE, HOW MANY MINUTES DO YOU ENGAGE IN EXERCISE AT THIS LEVEL?: 0 MIN

## 2023-07-25 ASSESSMENT — SOCIAL DETERMINANTS OF HEALTH (SDOH)
WITHIN THE LAST YEAR, HAVE YOU BEEN HUMILIATED OR EMOTIONALLY ABUSED IN OTHER WAYS BY YOUR PARTNER OR EX-PARTNER?: NO
WITHIN THE LAST YEAR, HAVE TO BEEN RAPED OR FORCED TO HAVE ANY KIND OF SEXUAL ACTIVITY BY YOUR PARTNER OR EX-PARTNER?: NO
HOW OFTEN DO YOU ATTEND CHURCH OR RELIGIOUS SERVICES?: MORE THAN 4 TIMES PER YEAR
WITHIN THE LAST YEAR, HAVE YOU BEEN KICKED, HIT, SLAPPED, OR OTHERWISE PHYSICALLY HURT BY YOUR PARTNER OR EX-PARTNER?: NO
DO YOU BELONG TO ANY CLUBS OR ORGANIZATIONS SUCH AS CHURCH GROUPS UNIONS, FRATERNAL OR ATHLETIC GROUPS, OR SCHOOL GROUPS?: YES
IN A TYPICAL WEEK, HOW MANY TIMES DO YOU TALK ON THE PHONE WITH FAMILY, FRIENDS, OR NEIGHBORS?: THREE TIMES A WEEK
HOW OFTEN DO YOU GET TOGETHER WITH FRIENDS OR RELATIVES?: ONCE A WEEK
HOW HARD IS IT FOR YOU TO PAY FOR THE VERY BASICS LIKE FOOD, HOUSING, MEDICAL CARE, AND HEATING?: NOT HARD AT ALL
HOW OFTEN DO YOU ATTENT MEETINGS OF THE CLUB OR ORGANIZATION YOU BELONG TO?: MORE THAN 4 TIMES PER YEAR
WITHIN THE LAST YEAR, HAVE YOU BEEN AFRAID OF YOUR PARTNER OR EX-PARTNER?: NO

## 2023-07-25 ASSESSMENT — LIFESTYLE VARIABLES
HOW MANY STANDARD DRINKS CONTAINING ALCOHOL DO YOU HAVE ON A TYPICAL DAY: 1 OR 2
HOW OFTEN DO YOU HAVE A DRINK CONTAINING ALCOHOL: 2-3 TIMES A WEEK

## 2023-07-25 ASSESSMENT — PATIENT HEALTH QUESTIONNAIRE - PHQ9
2. FEELING DOWN, DEPRESSED OR HOPELESS: 0
SUM OF ALL RESPONSES TO PHQ QUESTIONS 1-9: 0
SUM OF ALL RESPONSES TO PHQ9 QUESTIONS 1 & 2: 0
1. LITTLE INTEREST OR PLEASURE IN DOING THINGS: 0

## 2023-07-25 ASSESSMENT — ANXIETY QUESTIONNAIRES
3. WORRYING TOO MUCH ABOUT DIFFERENT THINGS: 0
5. BEING SO RESTLESS THAT IT IS HARD TO SIT STILL: 0
2. NOT BEING ABLE TO STOP OR CONTROL WORRYING: 0
IF YOU CHECKED OFF ANY PROBLEMS ON THIS QUESTIONNAIRE, HOW DIFFICULT HAVE THESE PROBLEMS MADE IT FOR YOU TO DO YOUR WORK, TAKE CARE OF THINGS AT HOME, OR GET ALONG WITH OTHER PEOPLE: NOT DIFFICULT AT ALL
1. FEELING NERVOUS, ANXIOUS, OR ON EDGE: 0
7. FEELING AFRAID AS IF SOMETHING AWFUL MIGHT HAPPEN: 0
6. BECOMING EASILY ANNOYED OR IRRITABLE: 0
GAD7 TOTAL SCORE: 0
4. TROUBLE RELAXING: 0

## 2023-07-26 LAB
ALBUMIN SERPL-MCNC: 3.8 G/DL (ref 3.5–5)
ALBUMIN/GLOB SERPL: 1 (ref 1.1–2.2)
ALP SERPL-CCNC: 107 U/L (ref 45–117)
ALT SERPL-CCNC: 24 U/L (ref 12–78)
ANION GAP SERPL CALC-SCNC: 4 MMOL/L (ref 5–15)
APPEARANCE UR: CLEAR
AST SERPL-CCNC: 18 U/L (ref 15–37)
BACTERIA URNS QL MICRO: NEGATIVE /HPF
BASOPHILS # BLD: 0 K/UL (ref 0–0.1)
BASOPHILS NFR BLD: 0 % (ref 0–1)
BILIRUB SERPL-MCNC: 0.5 MG/DL (ref 0.2–1)
BILIRUB UR QL: NEGATIVE
BUN SERPL-MCNC: 34 MG/DL (ref 6–20)
BUN/CREAT SERPL: 26 (ref 12–20)
CALCIUM SERPL-MCNC: 9.8 MG/DL (ref 8.5–10.1)
CHLORIDE SERPL-SCNC: 103 MMOL/L (ref 97–108)
CHOLEST SERPL-MCNC: 188 MG/DL
CO2 SERPL-SCNC: 30 MMOL/L (ref 21–32)
COLOR UR: ABNORMAL
CREAT SERPL-MCNC: 1.3 MG/DL (ref 0.55–1.02)
CRP SERPL HS-MCNC: 7.1 MG/L
DIFFERENTIAL METHOD BLD: ABNORMAL
EOSINOPHIL # BLD: 0.1 K/UL (ref 0–0.4)
EOSINOPHIL NFR BLD: 2 % (ref 0–7)
EPITH CASTS URNS QL MICRO: ABNORMAL /LPF
ERYTHROCYTE [DISTWIDTH] IN BLOOD BY AUTOMATED COUNT: 13.2 % (ref 11.5–14.5)
GLOBULIN SER CALC-MCNC: 3.7 G/DL (ref 2–4)
GLUCOSE SERPL-MCNC: 98 MG/DL (ref 65–100)
GLUCOSE UR STRIP.AUTO-MCNC: NEGATIVE MG/DL
HCT VFR BLD AUTO: 40.1 % (ref 35–47)
HDLC SERPL-MCNC: 95 MG/DL
HDLC SERPL: 2 (ref 0–5)
HGB BLD-MCNC: 11.9 G/DL (ref 11.5–16)
HGB UR QL STRIP: NEGATIVE
HYALINE CASTS URNS QL MICRO: ABNORMAL /LPF (ref 0–5)
IMM GRANULOCYTES # BLD AUTO: 0 K/UL (ref 0–0.04)
IMM GRANULOCYTES NFR BLD AUTO: 0 % (ref 0–0.5)
KETONES UR QL STRIP.AUTO: NEGATIVE MG/DL
LDLC SERPL CALC-MCNC: 79.8 MG/DL (ref 0–100)
LEUKOCYTE ESTERASE UR QL STRIP.AUTO: ABNORMAL
LYMPHOCYTES # BLD: 1.8 K/UL (ref 0.8–3.5)
LYMPHOCYTES NFR BLD: 34 % (ref 12–49)
MCH RBC QN AUTO: 26.6 PG (ref 26–34)
MCHC RBC AUTO-ENTMCNC: 29.7 G/DL (ref 30–36.5)
MCV RBC AUTO: 89.7 FL (ref 80–99)
MONOCYTES # BLD: 0.5 K/UL (ref 0–1)
MONOCYTES NFR BLD: 10 % (ref 5–13)
NEUTS SEG # BLD: 2.8 K/UL (ref 1.8–8)
NEUTS SEG NFR BLD: 54 % (ref 32–75)
NITRITE UR QL STRIP.AUTO: NEGATIVE
NRBC # BLD: 0 K/UL (ref 0–0.01)
NRBC BLD-RTO: 0 PER 100 WBC
PH UR STRIP: 6 (ref 5–8)
PLATELET # BLD AUTO: 256 K/UL (ref 150–400)
PMV BLD AUTO: 10.5 FL (ref 8.9–12.9)
POTASSIUM SERPL-SCNC: 4.8 MMOL/L (ref 3.5–5.1)
PROT SERPL-MCNC: 7.5 G/DL (ref 6.4–8.2)
PROT UR STRIP-MCNC: NEGATIVE MG/DL
RBC # BLD AUTO: 4.47 M/UL (ref 3.8–5.2)
RBC #/AREA URNS HPF: ABNORMAL /HPF (ref 0–5)
SODIUM SERPL-SCNC: 137 MMOL/L (ref 136–145)
SP GR UR REFRACTOMETRY: 1.01 (ref 1–1.03)
TRIGL SERPL-MCNC: 66 MG/DL
TSH SERPL DL<=0.05 MIU/L-ACNC: 0.78 UIU/ML (ref 0.36–3.74)
UROBILINOGEN UR QL STRIP.AUTO: 0.2 EU/DL (ref 0.2–1)
VLDLC SERPL CALC-MCNC: 13.2 MG/DL
WBC # BLD AUTO: 5.2 K/UL (ref 3.6–11)
WBC URNS QL MICRO: ABNORMAL /HPF (ref 0–4)

## 2023-07-28 ENCOUNTER — TELEPHONE (OUTPATIENT)
Facility: CLINIC | Age: 73
End: 2023-07-28

## 2023-07-28 LAB — APO B SERPL-MCNC: 77 MG/DL

## 2023-07-28 NOTE — TELEPHONE ENCOUNTER
Patient would like anesthesia or medication before her MRI procedure is done have not scheduled an appointment yet

## 2023-08-17 DIAGNOSIS — M48.062 SPINAL STENOSIS OF LUMBAR REGION WITH NEUROGENIC CLAUDICATION: Primary | ICD-10-CM

## 2023-08-18 RX ORDER — DIAZEPAM 10 MG/1
TABLET ORAL
Qty: 4 TABLET | Refills: 0 | Status: SHIPPED | OUTPATIENT
Start: 2023-08-18 | End: 2023-09-17

## 2023-08-23 ENCOUNTER — HOSPITAL ENCOUNTER (OUTPATIENT)
Facility: HOSPITAL | Age: 73
Discharge: HOME | End: 2023-08-26
Attending: INTERNAL MEDICINE
Payer: MEDICARE

## 2023-08-23 DIAGNOSIS — M48.062 SPINAL STENOSIS OF LUMBAR REGION WITH NEUROGENIC CLAUDICATION: ICD-10-CM

## 2023-08-23 PROCEDURE — 72148 MRI LUMBAR SPINE W/O DYE: CPT

## 2023-08-25 ENCOUNTER — OFFICE VISIT (OUTPATIENT)
Age: 73
End: 2023-08-25
Payer: MEDICARE

## 2023-08-25 VITALS
OXYGEN SATURATION: 100 % | BODY MASS INDEX: 32.86 KG/M2 | SYSTOLIC BLOOD PRESSURE: 124 MMHG | HEIGHT: 65 IN | HEART RATE: 77 BPM | DIASTOLIC BLOOD PRESSURE: 68 MMHG | WEIGHT: 197.2 LBS

## 2023-08-25 DIAGNOSIS — I10 ESSENTIAL (PRIMARY) HYPERTENSION: Primary | ICD-10-CM

## 2023-08-25 DIAGNOSIS — I49.3 VENTRICULAR PREMATURE DEPOLARIZATION: ICD-10-CM

## 2023-08-25 DIAGNOSIS — Z82.49 FAMILY HISTORY OF ISCHEMIC HEART DISEASE AND OTHER DISEASES OF THE CIRCULATORY SYSTEM: ICD-10-CM

## 2023-08-25 DIAGNOSIS — R00.2 PALPITATIONS: ICD-10-CM

## 2023-08-25 DIAGNOSIS — I87.2 VENOUS INSUFFICIENCY (CHRONIC) (PERIPHERAL): ICD-10-CM

## 2023-08-25 PROCEDURE — 99213 OFFICE O/P EST LOW 20 MIN: CPT | Performed by: SPECIALIST

## 2023-08-25 RX ORDER — OMEGA-3/DHA/EPA/FISH OIL 60 MG-90MG
CAPSULE ORAL
COMMUNITY

## 2023-08-25 ASSESSMENT — PATIENT HEALTH QUESTIONNAIRE - PHQ9
SUM OF ALL RESPONSES TO PHQ9 QUESTIONS 1 & 2: 0
SUM OF ALL RESPONSES TO PHQ QUESTIONS 1-9: 0
1. LITTLE INTEREST OR PLEASURE IN DOING THINGS: 0
2. FEELING DOWN, DEPRESSED OR HOPELESS: 0
SUM OF ALL RESPONSES TO PHQ QUESTIONS 1-9: 0

## 2023-08-25 NOTE — PROGRESS NOTES
HISTORY OF PRESENT ILLNESS  Prabhu Machuca is a 75 y.o. female     SUMMARY:   Patient Active Problem List   Diagnosis    Hypertension    Obesity    Pain in both lower extremities    Carpal tunnel syndrome of right wrist    Primary osteoarthritis    Change in bowel habits    Family history of colon cancer    Hx of colonoscopy    Physical deconditioning    Anxiety    Spinal stenosis of lumbar region with neurogenic claudication    Multinodular non-toxic goiter    Neuropathy    Chronic midline low back pain without sciatica    Venous insufficiency    Left atrial enlargement    Palpitations    Primary osteoarthritis of left knee    Prerenal azotemia            CARDIOLOGY STUDIES TO DATE:  3/18 echo normal lvef, mild mr and tr without pul htn  3/18 event monitor, nsr  6/20 rare pacs inconsistent associated with flutter, skipped beats. No sig tachy or aman    Chief Complaint   Patient presents with    Edema       HPI :  Overall she is doing quite well. She has been having a lot of trouble with her back recently and had to have an MRI and may be starting some therapy. She still has dependent edema which is well controlled with her compression stockings when she wears them. Her left knee still bothers her. Blood pressure is well controlled. She spent a good period of the summer out in [place] with her daughter and grandsons to play baseball and is really enjoyed that. She occasionally notes some twitching in her biceps area on the left no other symptoms like that and I reassured her that was not anything serious.   Occasional palpitations in the evening when she is quiet and sitting still    CARDIAC ROS:   negative for chest pain, claudication, dyspnea, orthopnea, paroxysmal nocturnal dyspnea, and syncope    Family History   Problem Relation Age of Onset    Hypertension Brother     Heart Disease Brother     Cancer Brother         Multiple myeloma    Hypertension Brother     Cancer Brother

## 2023-10-25 ENCOUNTER — OFFICE VISIT (OUTPATIENT)
Facility: CLINIC | Age: 73
End: 2023-10-25
Payer: MEDICARE

## 2023-10-25 VITALS
BODY MASS INDEX: 32.54 KG/M2 | SYSTOLIC BLOOD PRESSURE: 145 MMHG | HEIGHT: 65 IN | WEIGHT: 195.3 LBS | OXYGEN SATURATION: 100 % | HEART RATE: 81 BPM | RESPIRATION RATE: 16 BRPM | TEMPERATURE: 98.1 F | DIASTOLIC BLOOD PRESSURE: 81 MMHG

## 2023-10-25 DIAGNOSIS — M17.12 PRIMARY OSTEOARTHRITIS OF LEFT KNEE: ICD-10-CM

## 2023-10-25 DIAGNOSIS — M48.062 SPINAL STENOSIS OF LUMBAR REGION WITH NEUROGENIC CLAUDICATION: Primary | ICD-10-CM

## 2023-10-25 DIAGNOSIS — F41.9 ANXIETY: ICD-10-CM

## 2023-10-25 DIAGNOSIS — R53.81 PHYSICAL DECONDITIONING: ICD-10-CM

## 2023-10-25 DIAGNOSIS — I10 PRIMARY HYPERTENSION: ICD-10-CM

## 2023-10-25 PROCEDURE — 99214 OFFICE O/P EST MOD 30 MIN: CPT | Performed by: INTERNAL MEDICINE

## 2023-10-25 PROCEDURE — 3078F DIAST BP <80 MM HG: CPT | Performed by: INTERNAL MEDICINE

## 2023-10-25 PROCEDURE — 3074F SYST BP LT 130 MM HG: CPT | Performed by: INTERNAL MEDICINE

## 2023-10-25 PROCEDURE — 1123F ACP DISCUSS/DSCN MKR DOCD: CPT | Performed by: INTERNAL MEDICINE

## 2023-10-25 RX ORDER — PREGABALIN 25 MG/1
25 CAPSULE ORAL 2 TIMES DAILY
COMMUNITY
Start: 2023-10-19

## 2023-10-25 ASSESSMENT — PATIENT HEALTH QUESTIONNAIRE - PHQ9
2. FEELING DOWN, DEPRESSED OR HOPELESS: 0
SUM OF ALL RESPONSES TO PHQ QUESTIONS 1-9: 0
SUM OF ALL RESPONSES TO PHQ9 QUESTIONS 1 & 2: 0
1. LITTLE INTEREST OR PLEASURE IN DOING THINGS: 0
SUM OF ALL RESPONSES TO PHQ QUESTIONS 1-9: 0

## 2023-10-25 ASSESSMENT — ANXIETY QUESTIONNAIRES
4. TROUBLE RELAXING: 0
GAD7 TOTAL SCORE: 0
IF YOU CHECKED OFF ANY PROBLEMS ON THIS QUESTIONNAIRE, HOW DIFFICULT HAVE THESE PROBLEMS MADE IT FOR YOU TO DO YOUR WORK, TAKE CARE OF THINGS AT HOME, OR GET ALONG WITH OTHER PEOPLE: NOT DIFFICULT AT ALL
6. BECOMING EASILY ANNOYED OR IRRITABLE: 0
2. NOT BEING ABLE TO STOP OR CONTROL WORRYING: 0
1. FEELING NERVOUS, ANXIOUS, OR ON EDGE: 0
5. BEING SO RESTLESS THAT IT IS HARD TO SIT STILL: 0
3. WORRYING TOO MUCH ABOUT DIFFERENT THINGS: 0
7. FEELING AFRAID AS IF SOMETHING AWFUL MIGHT HAPPEN: 0

## 2023-10-25 NOTE — PROGRESS NOTES
[facility] and Primary Care  [address] [facility] [facility] [address]  Phone:  [phone]  Fax: [phone]       Chief Complaint   Patient presents with    Follow-up    Hypertension     Patient here for follow up high blood pressure. .      SUBJECTIVE:    Sarah Huff is a 75 y.o. female comes in stating that she has not been feeling well. She has been having problems with back pain, which is getting worse. It is aggravated with walking to the point where she begins to have pain in both legs with that. It interferes with her sleep at night. An MRI of the LS spine demonstrated moderate-to-severe stenosis at L2-L3 region. She saw an orthopedic physician Dr. Natasha Holman for the back and was given Pregabalin 25 mg. She has been having intermittent swelling of her lower extremities which is a chronic ongoing problem orthostatic in nature. She is under a great deal of stress with home issues. She has a past history of primary hypertension. She definitely needs to lose weight. Current Outpatient Medications   Medication Sig Dispense Refill    pregabalin (LYRICA) 25 MG capsule Take 1 capsule by mouth 2 times daily. Calcium Carb-Cholecalciferol (CALCIUM 600 + D PO) 1 TAB(S) ORALLY QD for 30 DAY(S)      COLLAGEN PO by Other route      Omega-3 Fatty Acids (FISH OIL) 500 MG CAPS 2 cap(s) orally 2 times a day      Multiple Vitamin (MULTIVITAMIN ADULT PO) Take 1 tablet by mouth daily      Turmeric (QC TUMERIC COMPLEX PO) QD      meloxicam (MOBIC) 15 MG tablet TAKE 1 TABLET BY MOUTH EVERY DAY 30 tablet 5    triamterene-hydroCHLOROthiazide (MAXZIDE-25) 37.5-25 MG per tablet TAKE 1 TABLET BY MOUTH EVERY DAY 90 tablet 3     No current facility-administered medications for this visit.      Past Medical History:   Diagnosis Date    Hypertension     Thyroid nodule     annual eval with US     Past Surgical History:   Procedure Laterality Date    COLONOSCOPY       No Known

## 2023-10-27 RX ORDER — TRIAMTERENE AND HYDROCHLOROTHIAZIDE 37.5; 25 MG/1; MG/1
TABLET ORAL
Qty: 90 TABLET | Refills: 3 | Status: SHIPPED | OUTPATIENT
Start: 2023-10-27

## 2023-11-02 ENCOUNTER — OFFICE VISIT (OUTPATIENT)
Dept: PRIMARY CARE CLINIC | Facility: CLINIC | Age: 73
End: 2023-11-02
Payer: MEDICARE

## 2023-11-02 VITALS
RESPIRATION RATE: 17 BRPM | HEART RATE: 74 BPM | HEIGHT: 65 IN | TEMPERATURE: 97.5 F | SYSTOLIC BLOOD PRESSURE: 125 MMHG | WEIGHT: 197.6 LBS | BODY MASS INDEX: 32.92 KG/M2 | OXYGEN SATURATION: 100 % | DIASTOLIC BLOOD PRESSURE: 76 MMHG

## 2023-11-02 DIAGNOSIS — R79.89 ABNORMAL BLOOD CREATININE LEVEL: ICD-10-CM

## 2023-11-02 DIAGNOSIS — I10 PRIMARY HYPERTENSION: ICD-10-CM

## 2023-11-02 DIAGNOSIS — Z23 NEED FOR IMMUNIZATION AGAINST INFLUENZA: ICD-10-CM

## 2023-11-02 DIAGNOSIS — M17.10 PRIMARY OSTEOARTHRITIS OF KNEE, UNSPECIFIED LATERALITY: ICD-10-CM

## 2023-11-02 DIAGNOSIS — I10 PRIMARY HYPERTENSION: Primary | ICD-10-CM

## 2023-11-02 DIAGNOSIS — M48.062 SPINAL STENOSIS OF LUMBAR REGION WITH NEUROGENIC CLAUDICATION: ICD-10-CM

## 2023-11-02 PROCEDURE — 90694 VACC AIIV4 NO PRSRV 0.5ML IM: CPT | Performed by: FAMILY MEDICINE

## 2023-11-02 PROCEDURE — G0008 ADMIN INFLUENZA VIRUS VAC: HCPCS | Performed by: FAMILY MEDICINE

## 2023-11-02 PROCEDURE — 1123F ACP DISCUSS/DSCN MKR DOCD: CPT | Performed by: FAMILY MEDICINE

## 2023-11-02 PROCEDURE — 3078F DIAST BP <80 MM HG: CPT | Performed by: FAMILY MEDICINE

## 2023-11-02 PROCEDURE — 3074F SYST BP LT 130 MM HG: CPT | Performed by: FAMILY MEDICINE

## 2023-11-02 PROCEDURE — 99214 OFFICE O/P EST MOD 30 MIN: CPT | Performed by: FAMILY MEDICINE

## 2023-11-02 SDOH — ECONOMIC STABILITY: FOOD INSECURITY: WITHIN THE PAST 12 MONTHS, YOU WORRIED THAT YOUR FOOD WOULD RUN OUT BEFORE YOU GOT MONEY TO BUY MORE.: PATIENT DECLINED

## 2023-11-02 SDOH — ECONOMIC STABILITY: HOUSING INSECURITY
IN THE LAST 12 MONTHS, WAS THERE A TIME WHEN YOU DID NOT HAVE A STEADY PLACE TO SLEEP OR SLEPT IN A SHELTER (INCLUDING NOW)?: PATIENT REFUSED

## 2023-11-02 SDOH — ECONOMIC STABILITY: INCOME INSECURITY: HOW HARD IS IT FOR YOU TO PAY FOR THE VERY BASICS LIKE FOOD, HOUSING, MEDICAL CARE, AND HEATING?: PATIENT DECLINED

## 2023-11-02 SDOH — ECONOMIC STABILITY: FOOD INSECURITY: WITHIN THE PAST 12 MONTHS, THE FOOD YOU BOUGHT JUST DIDN'T LAST AND YOU DIDN'T HAVE MONEY TO GET MORE.: PATIENT DECLINED

## 2023-11-02 ASSESSMENT — PATIENT HEALTH QUESTIONNAIRE - PHQ9
SUM OF ALL RESPONSES TO PHQ QUESTIONS 1-9: 0
SUM OF ALL RESPONSES TO PHQ QUESTIONS 1-9: 0
SUM OF ALL RESPONSES TO PHQ9 QUESTIONS 1 & 2: 0
2. FEELING DOWN, DEPRESSED OR HOPELESS: 0
1. LITTLE INTEREST OR PLEASURE IN DOING THINGS: 0
SUM OF ALL RESPONSES TO PHQ QUESTIONS 1-9: 0
SUM OF ALL RESPONSES TO PHQ QUESTIONS 1-9: 0

## 2023-11-02 NOTE — PROGRESS NOTES
HPI     Chief Complaint   Patient presents with    Establish Care     Pt just had an mri done and saw a spine doctor and wants a 2nd opinion. Pt has back pain all the time. She is a 75 y.o. female who presents to establish care. Establishing Care    Pmhx : htn, lumbar spinal tenosis  Surghx reviewed. Fhx reviewed. Sochx reviewed. Follows with gyn at [facility]. She reports normal colonoscopy about 2 years ago. Hx thyroid nodules, biopsies have been neagative. Enjoys working in her yard. Significant life stressor related to her neighbor relationship. Spinal stenosis and OA. Recently prescribed lyrica for pain but has not started this. She does get some relief from tylenol prn. Uses nsaid prn.     - Chronic medical problems:  Past Medical History:   Diagnosis Date    Hypertension     Osteoarthritis     Spinal stenosis     Thyroid nodule     annual eval with US     - Current medications:   Current Outpatient Medications   Medication Sig    triamterene-hydroCHLOROthiazide (MAXZIDE-25) 37.5-25 MG per tablet TAKE 1 TABLET BY MOUTH EVERY DAY    pregabalin (LYRICA) 25 MG capsule Take 1 capsule by mouth 2 times daily. Calcium Carb-Cholecalciferol (CALCIUM 600 + D PO) 1 TAB(S) ORALLY QD for 30 DAY(S)    COLLAGEN PO by Other route    Omega-3 Fatty Acids (FISH OIL) 500 MG CAPS 2 cap(s) orally 2 times a day    Multiple Vitamin (MULTIVITAMIN ADULT PO) Take 1 tablet by mouth daily    meloxicam (MOBIC) 15 MG tablet TAKE 1 TABLET BY MOUTH EVERY DAY    Turmeric (QC TUMERIC COMPLEX PO) QD     No current facility-administered medications for this visit.      - Family history:   Family History   Problem Relation Age of Onset    Hypertension Brother     Heart Disease Brother     Cancer Brother         Multiple myeloma    Hypertension Brother     Cancer Brother         Esophageal    Hypertension Sister     Cancer Sister         Colon CA    Hypertension Father     Heart Disease Father

## 2023-11-03 ENCOUNTER — TELEPHONE (OUTPATIENT)
Dept: PRIMARY CARE CLINIC | Facility: CLINIC | Age: 73
End: 2023-11-03

## 2023-11-03 LAB
ANION GAP SERPL CALC-SCNC: 9 MMOL/L (ref 5–15)
BUN SERPL-MCNC: 30 MG/DL (ref 6–20)
BUN/CREAT SERPL: 26 (ref 12–20)
CALCIUM SERPL-MCNC: 9.2 MG/DL (ref 8.5–10.1)
CHLORIDE SERPL-SCNC: 108 MMOL/L (ref 97–108)
CO2 SERPL-SCNC: 22 MMOL/L (ref 21–32)
CREAT SERPL-MCNC: 1.17 MG/DL (ref 0.55–1.02)
CREAT UR-MCNC: 84.7 MG/DL
GLUCOSE SERPL-MCNC: 84 MG/DL (ref 65–100)
MICROALBUMIN UR-MCNC: 1.01 MG/DL
MICROALBUMIN/CREAT UR-RTO: 12 MG/G (ref 0–30)
POTASSIUM SERPL-SCNC: 4.4 MMOL/L (ref 3.5–5.1)
SODIUM SERPL-SCNC: 139 MMOL/L (ref 136–145)

## 2023-11-03 NOTE — TELEPHONE ENCOUNTER
----- Message from Byron Chanel sent at 11/3/2023 10:07 AM EDT -----  Subject: Message to Provider    QUESTIONS  Information for Provider? Patient left without her discharge papers and   would like it mailed out to her please mail out he after summary report   ---------------------------------------------------------------------------  --------------  [address]  [phone]; OK to leave message on voicemail  ---------------------------------------------------------------------------  --------------  SCRIPT ANSWERS  Relationship to Patient?  Self

## 2024-02-26 ENCOUNTER — OFFICE VISIT (OUTPATIENT)
Facility: CLINIC | Age: 74
End: 2024-02-26
Payer: MEDICARE

## 2024-02-26 VITALS
HEIGHT: 65 IN | BODY MASS INDEX: 32.77 KG/M2 | OXYGEN SATURATION: 100 % | RESPIRATION RATE: 16 BRPM | TEMPERATURE: 97.9 F | WEIGHT: 196.7 LBS | DIASTOLIC BLOOD PRESSURE: 84 MMHG | HEART RATE: 84 BPM | SYSTOLIC BLOOD PRESSURE: 144 MMHG

## 2024-02-26 DIAGNOSIS — R79.89 PRERENAL AZOTEMIA: ICD-10-CM

## 2024-02-26 DIAGNOSIS — Z00.00 MEDICARE ANNUAL WELLNESS VISIT, SUBSEQUENT: ICD-10-CM

## 2024-02-26 DIAGNOSIS — I10 PRIMARY HYPERTENSION: ICD-10-CM

## 2024-02-26 DIAGNOSIS — M48.062 SPINAL STENOSIS OF LUMBAR REGION WITH NEUROGENIC CLAUDICATION: ICD-10-CM

## 2024-02-26 DIAGNOSIS — G47.00 INSOMNIA, UNSPECIFIED TYPE: ICD-10-CM

## 2024-02-26 DIAGNOSIS — M17.12 PRIMARY OSTEOARTHRITIS OF LEFT KNEE: ICD-10-CM

## 2024-02-26 DIAGNOSIS — M16.11 PRIMARY OSTEOARTHRITIS OF RIGHT HIP: Primary | ICD-10-CM

## 2024-02-26 PROCEDURE — 3077F SYST BP >= 140 MM HG: CPT | Performed by: INTERNAL MEDICINE

## 2024-02-26 PROCEDURE — 1123F ACP DISCUSS/DSCN MKR DOCD: CPT | Performed by: INTERNAL MEDICINE

## 2024-02-26 PROCEDURE — 3079F DIAST BP 80-89 MM HG: CPT | Performed by: INTERNAL MEDICINE

## 2024-02-26 PROCEDURE — G0439 PPPS, SUBSEQ VISIT: HCPCS | Performed by: INTERNAL MEDICINE

## 2024-02-26 RX ORDER — CELECOXIB 100 MG/1
100 CAPSULE ORAL 2 TIMES DAILY
COMMUNITY
Start: 2024-01-23 | End: 2024-03-05

## 2024-02-26 ASSESSMENT — LIFESTYLE VARIABLES
HOW MANY STANDARD DRINKS CONTAINING ALCOHOL DO YOU HAVE ON A TYPICAL DAY: PATIENT DOES NOT DRINK
HOW OFTEN DO YOU HAVE A DRINK CONTAINING ALCOHOL: NEVER

## 2024-02-26 NOTE — PROGRESS NOTES
Chief Complaint   Patient presents with    Medicare AWV     \"Have you been to the ER, urgent care clinic since your last visit?  Hospitalized since your last visit?\"    NO    “Have you seen or consulted any other health care providers outside of [facility] since your last visit?”    NO       Have you had a mammogram?”   NO

## 2024-02-27 LAB
ANION GAP SERPL CALC-SCNC: 5 MMOL/L (ref 5–15)
BUN SERPL-MCNC: 26 MG/DL (ref 6–20)
BUN/CREAT SERPL: 23 (ref 12–20)
CALCIUM SERPL-MCNC: 9.9 MG/DL (ref 8.5–10.1)
CHLORIDE SERPL-SCNC: 105 MMOL/L (ref 97–108)
CO2 SERPL-SCNC: 29 MMOL/L (ref 21–32)
CREAT SERPL-MCNC: 1.11 MG/DL (ref 0.55–1.02)
GLUCOSE SERPL-MCNC: 93 MG/DL (ref 65–100)
POTASSIUM SERPL-SCNC: 4 MMOL/L (ref 3.5–5.1)
SODIUM SERPL-SCNC: 139 MMOL/L (ref 136–145)

## 2024-03-01 ENCOUNTER — TELEPHONE (OUTPATIENT)
Facility: CLINIC | Age: 74
End: 2024-03-01

## 2024-03-01 NOTE — TELEPHONE ENCOUNTER
----- Message from Kameron García MD sent at 3/1/2024  3:53 PM EST -----  Tell patient she has to discontinue her diuretic

## 2024-03-05 ENCOUNTER — TELEPHONE (OUTPATIENT)
Facility: CLINIC | Age: 74
End: 2024-03-05

## 2024-03-05 PROBLEM — G47.00 INSOMNIA: Status: ACTIVE | Noted: 2024-03-05

## 2024-03-05 NOTE — TELEPHONE ENCOUNTER
Pt asked if she should stop taking BP meds and only take celebrex. Per Dr García pt is to stop the triamterene-hydroChlorothizide while she is taking the Celebrex (celeboxib)  kanchan YANCEY

## 2024-03-06 NOTE — PROGRESS NOTES
[facility] Sports Medicine and Primary Care  [address]  [address]  [facility] [address]  Phone:  [phone]  Fax: [phone]       Chief Complaint   Patient presents with    Medicare AWV   .      SUBJECTIVE:    Bita Moreno is a 73 y.o. female Comes in for return visit, quite uncomfortable. She is having increasing pain in her right hip, as well as her left knee, and low back pain.  Her low back pain is related to lumbar spinal stenosis, which is getting progressively worse. She is to the point where she is rather uncomfortable. She was given Celebrex by one of her physicians, which she takes, and she is quite vague about whether or not it was helping.  I gave her Meloxicam before that, which prompted the orthopedic physician to give her Celebrex. She got no improvement from the Meloxicam.    She has a history of insomnia, as well as primary hypertension and obesity.    From a social perspective, however, she thinks that her daughter and her family will be moving back to the [facility] from [place]. Her daughter is  in a nonprofit.             Current Outpatient Medications   Medication Sig Dispense Refill    celecoxib (CELEBREX) 100 MG capsule Take 1 capsule by mouth 2 times daily      triamterene-hydroCHLOROthiazide (MAXZIDE-25) 37.5-25 MG per tablet TAKE 1 TABLET BY MOUTH EVERY DAY 90 tablet 3    Calcium Carb-Cholecalciferol (CALCIUM 600 + D PO) 1 TAB(S) ORALLY QD for 30 DAY(S)      COLLAGEN PO by Other route      Omega-3 Fatty Acids (FISH OIL) 500 MG CAPS 2 cap(s) orally 2 times a day      Multiple Vitamin (MULTIVITAMIN ADULT PO) Take 1 tablet by mouth daily      Turmeric (QC TUMERIC COMPLEX PO) QD      pregabalin (LYRICA) 25 MG capsule Take 1 capsule by mouth 2 times daily. (Patient not taking: Reported on 2/26/2024)      meloxicam (MOBIC) 15 MG tablet TAKE 1 TABLET BY MOUTH EVERY DAY (Patient not taking: Reported on 2/26/2024) 30 tablet 5     No current facility-administered

## 2024-03-25 ENCOUNTER — OFFICE VISIT (OUTPATIENT)
Facility: CLINIC | Age: 74
End: 2024-03-25
Payer: MEDICARE

## 2024-03-25 DIAGNOSIS — M17.12 PRIMARY OSTEOARTHRITIS OF LEFT KNEE: ICD-10-CM

## 2024-03-25 DIAGNOSIS — M54.50 CHRONIC MIDLINE LOW BACK PAIN WITHOUT SCIATICA: ICD-10-CM

## 2024-03-25 DIAGNOSIS — I87.2 VENOUS INSUFFICIENCY: ICD-10-CM

## 2024-03-25 DIAGNOSIS — R79.89 PRERENAL AZOTEMIA: Primary | ICD-10-CM

## 2024-03-25 DIAGNOSIS — M16.11 PRIMARY OSTEOARTHRITIS OF RIGHT HIP: ICD-10-CM

## 2024-03-25 DIAGNOSIS — G89.29 CHRONIC MIDLINE LOW BACK PAIN WITHOUT SCIATICA: ICD-10-CM

## 2024-03-25 PROBLEM — N76.2 VULVITIS: Status: ACTIVE | Noted: 2023-02-10

## 2024-03-25 PROBLEM — N64.4 PAIN OF BREAST: Status: ACTIVE | Noted: 2022-06-29

## 2024-03-25 PROBLEM — R30.0 DYSURIA: Status: ACTIVE | Noted: 2023-02-10

## 2024-03-25 PROBLEM — J02.9 ACUTE PHARYNGITIS: Status: ACTIVE | Noted: 2018-05-12

## 2024-03-25 PROBLEM — M43.16 SPONDYLOLISTHESIS OF LUMBAR REGION: Status: ACTIVE | Noted: 2023-10-19

## 2024-03-25 PROCEDURE — 99214 OFFICE O/P EST MOD 30 MIN: CPT | Performed by: INTERNAL MEDICINE

## 2024-03-25 PROCEDURE — 3074F SYST BP LT 130 MM HG: CPT | Performed by: INTERNAL MEDICINE

## 2024-03-25 PROCEDURE — 3078F DIAST BP <80 MM HG: CPT | Performed by: INTERNAL MEDICINE

## 2024-03-25 PROCEDURE — 1123F ACP DISCUSS/DSCN MKR DOCD: CPT | Performed by: INTERNAL MEDICINE

## 2024-03-25 SDOH — ECONOMIC STABILITY: FOOD INSECURITY: WITHIN THE PAST 12 MONTHS, YOU WORRIED THAT YOUR FOOD WOULD RUN OUT BEFORE YOU GOT MONEY TO BUY MORE.: NEVER TRUE

## 2024-03-25 SDOH — ECONOMIC STABILITY: FOOD INSECURITY: WITHIN THE PAST 12 MONTHS, THE FOOD YOU BOUGHT JUST DIDN'T LAST AND YOU DIDN'T HAVE MONEY TO GET MORE.: NEVER TRUE

## 2024-03-25 SDOH — ECONOMIC STABILITY: INCOME INSECURITY: HOW HARD IS IT FOR YOU TO PAY FOR THE VERY BASICS LIKE FOOD, HOUSING, MEDICAL CARE, AND HEATING?: NOT HARD AT ALL

## 2024-03-25 ASSESSMENT — PATIENT HEALTH QUESTIONNAIRE - PHQ9
SUM OF ALL RESPONSES TO PHQ QUESTIONS 1-9: 0
SUM OF ALL RESPONSES TO PHQ QUESTIONS 1-9: 0
1. LITTLE INTEREST OR PLEASURE IN DOING THINGS: NOT AT ALL
SUM OF ALL RESPONSES TO PHQ QUESTIONS 1-9: 0
SUM OF ALL RESPONSES TO PHQ9 QUESTIONS 1 & 2: 0
2. FEELING DOWN, DEPRESSED OR HOPELESS: NOT AT ALL
SUM OF ALL RESPONSES TO PHQ QUESTIONS 1-9: 0

## 2024-03-25 ASSESSMENT — ANXIETY QUESTIONNAIRES
1. FEELING NERVOUS, ANXIOUS, OR ON EDGE: NOT AT ALL
GAD7 TOTAL SCORE: 0
IF YOU CHECKED OFF ANY PROBLEMS ON THIS QUESTIONNAIRE, HOW DIFFICULT HAVE THESE PROBLEMS MADE IT FOR YOU TO DO YOUR WORK, TAKE CARE OF THINGS AT HOME, OR GET ALONG WITH OTHER PEOPLE: NOT DIFFICULT AT ALL
4. TROUBLE RELAXING: NOT AT ALL
5. BEING SO RESTLESS THAT IT IS HARD TO SIT STILL: NOT AT ALL
2. NOT BEING ABLE TO STOP OR CONTROL WORRYING: NOT AT ALL
7. FEELING AFRAID AS IF SOMETHING AWFUL MIGHT HAPPEN: NOT AT ALL
6. BECOMING EASILY ANNOYED OR IRRITABLE: NOT AT ALL
3. WORRYING TOO MUCH ABOUT DIFFERENT THINGS: NOT AT ALL

## 2024-03-25 NOTE — PROGRESS NOTES
[facility] Medicine and Primary Care  [address] [place] [address]  [address]  [facility] [address]  Phone:  [phone]  Fax: [phone]       Chief Complaint   Patient presents with    Follow-up    Hypertension   .      SUBJECTIVE:    Bita Moreno is a 73 y.o. female  Dictation on: 03/25/2024  3:47 PM by: VIRI PETTY [[id number]]          Current Outpatient Medications   Medication Sig Dispense Refill    Calcium Carb-Cholecalciferol (CALCIUM 600 + D PO) 1 TAB(S) ORALLY QD for 30 DAY(S)      COLLAGEN PO by Other route      Omega-3 Fatty Acids (FISH OIL) 500 MG CAPS 2 cap(s) orally 2 times a day      Multiple Vitamin (MULTIVITAMIN ADULT PO) Take 1 tablet by mouth daily      Turmeric (QC TUMERIC COMPLEX PO) QD      triamterene-hydroCHLOROthiazide (MAXZIDE-25) 37.5-25 MG per tablet TAKE 1 TABLET BY MOUTH EVERY DAY (Patient not taking: Reported on 3/25/2024) 90 tablet 3    pregabalin (LYRICA) 25 MG capsule Take 1 capsule by mouth 2 times daily. (Patient not taking: Reported on 2/26/2024)       No current facility-administered medications for this visit.     Past Medical History:   Diagnosis Date    Hypertension     Osteoarthritis     Spinal stenosis     Thyroid nodule     annual eval with US     Past Surgical History:   Procedure Laterality Date    COLONOSCOPY       Not on File      REVIEW OF SYSTEMS:  General: negative for - chills or fever  ENT: negative for - headaches, nasal congestion or tinnitus  Respiratory: negative for - cough, hemoptysis, shortness of breath or wheezing  Cardiovascular : negative for - chest pain, edema, palpitations or shortness of breath  Gastrointestinal: negative for - abdominal pain, blood in stools, heartburn or nausea/vomiting  Genito-Urinary: no dysuria, trouble voiding, or hematuria  Musculoskeletal: negative for - gait disturbance, joint pain, joint stiffness or joint swelling  Neurological: no TIA or stroke symptoms  Hematologic: no bruises, no bleeding, no swollen

## 2024-03-25 NOTE — PROGRESS NOTES
Chief Complaint   Patient presents with    Follow-up    Hypertension     \"Have you been to the ER, urgent care clinic since your last visit?  Hospitalized since your last visit?\"    NO    “Have you seen or consulted any other health care providers outside of [facility] since your last visit?”    NO    Have you had a mammogram?”   NO    Date of last Mammogram: 2/15/2022             Click Here for Release of Records Request

## 2024-03-26 LAB
ANION GAP SERPL CALC-SCNC: 7 MMOL/L (ref 5–15)
BUN SERPL-MCNC: 20 MG/DL (ref 6–20)
BUN/CREAT SERPL: 21 (ref 12–20)
CALCIUM SERPL-MCNC: 9.7 MG/DL (ref 8.5–10.1)
CHLORIDE SERPL-SCNC: 108 MMOL/L (ref 97–108)
CO2 SERPL-SCNC: 27 MMOL/L (ref 21–32)
CREAT SERPL-MCNC: 0.97 MG/DL (ref 0.55–1.02)
GLUCOSE SERPL-MCNC: 91 MG/DL (ref 65–100)
POTASSIUM SERPL-SCNC: 4.2 MMOL/L (ref 3.5–5.1)
SODIUM SERPL-SCNC: 142 MMOL/L (ref 136–145)

## 2024-03-26 NOTE — PROGRESS NOTES
Comes in for return visit. We talk at length about the reason I wanted her to stop her Celebrex, which is because she developed a renal dysfunction.  I felt this was all prerenal related to her diuretic usage and suggested she stop this.    She is somewhat upset because I was stopping her antihypertensive medication. In reality, the primary reason she had it was to eradicate the edema related to her venous insufficiency, which it never did.    She continues to have pain in her low back area, right hip and left knee. She even commented that the Celebrex, which she was taking, was not helping that much to either of these areas.    She, however, perked up when she told me that her daughter and [place].

## 2024-04-02 VITALS
SYSTOLIC BLOOD PRESSURE: 120 MMHG | RESPIRATION RATE: 16 BRPM | OXYGEN SATURATION: 100 % | WEIGHT: 201.1 LBS | BODY MASS INDEX: 33.51 KG/M2 | HEART RATE: 76 BPM | TEMPERATURE: 97.7 F | DIASTOLIC BLOOD PRESSURE: 80 MMHG | HEIGHT: 65 IN

## 2024-04-03 NOTE — PROGRESS NOTES
1. The patient had a prerenal azotemia most likely diuretic induced.  I discontinued the diuretic.  The reason I am doing this is to allow her to be able to use her NSAID.  I will await the results of her BMP today.  2. She has chronic low back pain which is most likely related to lumbar spinal stenosis.  Symptomatic treatment for now.  3. She has osteoarthritis involving her left knee which is getting worse.  She will follow up with orthopedics for a series of injections which may be of benefit at least transiently.  4. She has osteoarthritis involving her right hip.  Again, follow up with Orthopedics is necessary.  5. She has chronic venous insufficiency leading to swelling of her lower extremities.  This is one of the motivations for the diuretic usage which really should not be the case since third space fluid will not be mobilized and the treatment of choice would be knee high compression stockings.

## 2024-04-29 ENCOUNTER — TELEPHONE (OUTPATIENT)
Facility: CLINIC | Age: 74
End: 2024-04-29

## 2024-04-29 NOTE — TELEPHONE ENCOUNTER
Patient called stating that she notice blood in urine for 1 day with the celebrex and she d/c the celebrex and has not seen since, patient to stay off celebrex per

## 2024-05-12 RX ORDER — MELOXICAM 15 MG/1
15 TABLET ORAL DAILY
Qty: 30 TABLET | Refills: 3 | Status: SHIPPED | OUTPATIENT
Start: 2024-05-12

## 2024-05-20 ENCOUNTER — OFFICE VISIT (OUTPATIENT)
Age: 74
End: 2024-05-20
Payer: MEDICARE

## 2024-05-20 VITALS
WEIGHT: 194 LBS | HEIGHT: 65 IN | HEART RATE: 90 BPM | OXYGEN SATURATION: 99 % | DIASTOLIC BLOOD PRESSURE: 74 MMHG | BODY MASS INDEX: 32.32 KG/M2 | SYSTOLIC BLOOD PRESSURE: 136 MMHG

## 2024-05-20 DIAGNOSIS — R00.2 PALPITATIONS: ICD-10-CM

## 2024-05-20 DIAGNOSIS — I49.3 VENTRICULAR PREMATURE DEPOLARIZATION: ICD-10-CM

## 2024-05-20 DIAGNOSIS — I87.2 VENOUS INSUFFICIENCY (CHRONIC) (PERIPHERAL): ICD-10-CM

## 2024-05-20 DIAGNOSIS — I10 ESSENTIAL (PRIMARY) HYPERTENSION: Primary | ICD-10-CM

## 2024-05-20 PROCEDURE — 1123F ACP DISCUSS/DSCN MKR DOCD: CPT | Performed by: SPECIALIST

## 2024-05-20 PROCEDURE — 99213 OFFICE O/P EST LOW 20 MIN: CPT | Performed by: SPECIALIST

## 2024-05-20 PROCEDURE — 3075F SYST BP GE 130 - 139MM HG: CPT | Performed by: SPECIALIST

## 2024-05-20 PROCEDURE — 3078F DIAST BP <80 MM HG: CPT | Performed by: SPECIALIST

## 2024-05-20 ASSESSMENT — PATIENT HEALTH QUESTIONNAIRE - PHQ9
SUM OF ALL RESPONSES TO PHQ QUESTIONS 1-9: 0
2. FEELING DOWN, DEPRESSED OR HOPELESS: NOT AT ALL
SUM OF ALL RESPONSES TO PHQ9 QUESTIONS 1 & 2: 0
1. LITTLE INTEREST OR PLEASURE IN DOING THINGS: NOT AT ALL

## 2024-05-20 NOTE — PROGRESS NOTES
HISTORY OF PRESENT ILLNESS  Bita Moreno is a 73 y.o. female     SUMMARY:   Patient Active Problem List   Diagnosis    Hypertension    Obesity    Pain in both lower extremities    Carpal tunnel syndrome of right wrist    Primary osteoarthritis    Change in bowel habits    Family history of colon cancer    Hx of colonoscopy    Physical deconditioning    Anxiety    Spinal stenosis of lumbar region with neurogenic claudication    Multinodular non-toxic goiter    Neuropathy    Chronic midline low back pain without sciatica    Venous insufficiency    Left atrial enlargement    Palpitations    Primary osteoarthritis of left knee    Prerenal azotemia    Insomnia    Acute pharyngitis    Dysuria    Pain of breast    Primary osteoarthritis of right hip    Spondylolisthesis of lumbar region    Vulvitis            CARDIOLOGY STUDIES TO DATE:  3/18 echo normal lvef, mild mr and tr without pul htn  3/18 event monitor, nsr  6/20 rare pacs inconsistent associated with flutter, skipped beats. No sig tachy or aman    Chief Complaint   Patient presents with    Hypertension    Swelling     Both Ankles     Discuss Medications       HPI :  Cardiac wise she is doing fine other than a rare palpitation and chronic leg edema and venous insufficiency.  Dr. García stopped her diuretic a few weeks weeks ago because her creatinine bumped up and fortunately that is returned back to normal and she is inconsistently wearing her compression hose.  Her daughter and son-in-law are moving back to [place] from [place] and she is really happy about that.  She is not on any blood pressure medications right now and her pressure is good.  Continues to have lots of knee back and hip pain.  Most recent lipid profile looked great.    CARDIAC ROS:   negative for chest pain, claudication, dyspnea, orthopnea, paroxysmal nocturnal dyspnea, and syncope    Family History   Problem Relation Age of Onset    Hypertension Mother     Colon

## 2024-05-30 ENCOUNTER — OFFICE VISIT (OUTPATIENT)
Facility: CLINIC | Age: 74
End: 2024-05-30
Payer: MEDICARE

## 2024-05-30 DIAGNOSIS — E66.09 CLASS 1 OBESITY DUE TO EXCESS CALORIES WITHOUT SERIOUS COMORBIDITY WITH BODY MASS INDEX (BMI) OF 32.0 TO 32.9 IN ADULT: ICD-10-CM

## 2024-05-30 DIAGNOSIS — Z01.818 PREOPERATIVE EXAMINATION: ICD-10-CM

## 2024-05-30 DIAGNOSIS — M16.11 PRIMARY OSTEOARTHRITIS OF RIGHT HIP: ICD-10-CM

## 2024-05-30 DIAGNOSIS — I10 PRIMARY HYPERTENSION: ICD-10-CM

## 2024-05-30 DIAGNOSIS — R79.89 PRERENAL AZOTEMIA: Primary | ICD-10-CM

## 2024-05-30 DIAGNOSIS — M17.12 PRIMARY OSTEOARTHRITIS OF LEFT KNEE: ICD-10-CM

## 2024-05-30 PROCEDURE — 1123F ACP DISCUSS/DSCN MKR DOCD: CPT | Performed by: INTERNAL MEDICINE

## 2024-05-30 PROCEDURE — 3079F DIAST BP 80-89 MM HG: CPT | Performed by: INTERNAL MEDICINE

## 2024-05-30 PROCEDURE — 3077F SYST BP >= 140 MM HG: CPT | Performed by: INTERNAL MEDICINE

## 2024-05-30 PROCEDURE — 99214 OFFICE O/P EST MOD 30 MIN: CPT | Performed by: INTERNAL MEDICINE

## 2024-05-30 ASSESSMENT — PATIENT HEALTH QUESTIONNAIRE - PHQ9
SUM OF ALL RESPONSES TO PHQ QUESTIONS 1-9: 0
SUM OF ALL RESPONSES TO PHQ9 QUESTIONS 1 & 2: 0
SUM OF ALL RESPONSES TO PHQ QUESTIONS 1-9: 0
1. LITTLE INTEREST OR PLEASURE IN DOING THINGS: NOT AT ALL
2. FEELING DOWN, DEPRESSED OR HOPELESS: NOT AT ALL

## 2024-05-30 NOTE — PROGRESS NOTES
Chief Complaint   Patient presents with    Follow-up    Hip Pain     \"Have you been to the ER, urgent care clinic since your last visit?  Hospitalized since your last visit?\"    NO    “Have you seen or consulted any other health care providers outside of [facility] since your last visit?”    NO    Have you had a mammogram?”   NO    Date of last Mammogram: 2/15/2022             Click Here for Release of Records Request

## 2024-05-31 LAB
ANION GAP SERPL CALC-SCNC: 3 MMOL/L (ref 5–15)
BUN SERPL-MCNC: 20 MG/DL (ref 6–20)
BUN/CREAT SERPL: 20 (ref 12–20)
CALCIUM SERPL-MCNC: 9.9 MG/DL (ref 8.5–10.1)
CHLORIDE SERPL-SCNC: 107 MMOL/L (ref 97–108)
CO2 SERPL-SCNC: 29 MMOL/L (ref 21–32)
CREAT SERPL-MCNC: 0.98 MG/DL (ref 0.55–1.02)
GLUCOSE SERPL-MCNC: 92 MG/DL (ref 65–100)
POTASSIUM SERPL-SCNC: 3.8 MMOL/L (ref 3.5–5.1)
SODIUM SERPL-SCNC: 139 MMOL/L (ref 136–145)

## 2024-06-04 ENCOUNTER — TELEPHONE (OUTPATIENT)
Age: 74
End: 2024-06-04

## 2024-06-04 NOTE — TELEPHONE ENCOUNTER
Patient called to get cardiac clearance for a right total hip replacement scheduled on 6/20 with Dr. Marquez. Cardiac clearance forms were faxed.     Dr. Marquez # [phone]    Patient # [phone]

## 2024-06-05 VITALS
BODY MASS INDEX: 32.74 KG/M2 | HEART RATE: 77 BPM | SYSTOLIC BLOOD PRESSURE: 150 MMHG | DIASTOLIC BLOOD PRESSURE: 88 MMHG | OXYGEN SATURATION: 100 % | TEMPERATURE: 98 F | HEIGHT: 65 IN | WEIGHT: 196.5 LBS | RESPIRATION RATE: 20 BRPM

## 2024-06-05 NOTE — PROGRESS NOTES
Comes in for return visit.  She plans to have a total hip replacement on July 10th.  She has reached a point where the pain is rather severe and is interfering with her overall functional status.    She has been taking her NSAID as prescribed, but it has not really helped as one would expect.    She has a past history of primary hypertension, osteoarthritis involving the left knee, as well as obesity.

## 2024-06-05 NOTE — PROGRESS NOTES
1. The patient has a mild prerenal azotemia, which improved once she stopped her diuretic.  Nail function has historically been entirely normal.  2. Her blood pressure is slightly elevated, but no adjustment is made for now.  3. She definitely needs to lose weight.  This will assist in reduction in the intensity of the pain and progression of her arthritis generally.  This can be accomplished by eating meals, eliminating snacks and avoiding the consumption of processed carbohydrates.  4. She has moderate osteoarthritis involving the left knee, but fortunately this is at a point where steroid injections help.

## 2024-06-05 NOTE — PROGRESS NOTES
[facility]  [address] [place] [address]  [address]  [facility] [address]  Phone:  [phone]  Fax: [phone]       Chief Complaint   Patient presents with    Follow-up    Hip Pain   .      SUBJECTIVE:    Bita Moreno is a 73 y.o. female  Dictation on: 06/05/2024 12:36 AM by: VIRI PETTY [[id number]]          Current Outpatient Medications   Medication Sig Dispense Refill    meloxicam (MOBIC) 15 MG tablet Take 1 tablet by mouth daily 30 tablet 3    Calcium Carb-Cholecalciferol (CALCIUM 600 + D PO) 1 TAB(S) ORALLY QD for 30 DAY(S)      COLLAGEN PO by Other route      Omega-3 Fatty Acids (FISH OIL) 500 MG CAPS 2 cap(s) orally 2 times a day      Multiple Vitamin (MULTIVITAMIN ADULT PO) Take 1 tablet by mouth daily      Turmeric (QC TUMERIC COMPLEX PO) QD       No current facility-administered medications for this visit.     Past Medical History:   Diagnosis Date    Hypertension     Osteoarthritis     Spinal stenosis     Thyroid nodule     annual eval with US     Past Surgical History:   Procedure Laterality Date    COLONOSCOPY       No Known Allergies      REVIEW OF SYSTEMS:  General: negative for - chills or fever  ENT: negative for - headaches, nasal congestion or tinnitus  Respiratory: negative for - cough, hemoptysis, shortness of breath or wheezing  Cardiovascular : negative for - chest pain, edema, palpitations or shortness of breath  Gastrointestinal: negative for - abdominal pain, blood in stools, heartburn or nausea/vomiting  Genito-Urinary: no dysuria, trouble voiding, or hematuria  Musculoskeletal: negative for - gait disturbance, joint pain, joint stiffness or joint swelling  Neurological: no TIA or stroke symptoms  Hematologic: no bruises, no bleeding, no swollen glands  Integument: no lumps, mole changes, nail changes or rash  Endocrine: no malaise/lethargy or unexpected weight changes      Social History     Socioeconomic History    Marital status:      Spouse name: None

## 2024-06-11 ENCOUNTER — HOSPITAL ENCOUNTER (OUTPATIENT)
Facility: HOSPITAL | Age: 74
Discharge: HOME | End: 2024-06-14
Payer: MEDICARE

## 2024-06-11 VITALS
HEIGHT: 64 IN | SYSTOLIC BLOOD PRESSURE: 157 MMHG | TEMPERATURE: 98.1 F | WEIGHT: 193.34 LBS | HEART RATE: 92 BPM | DIASTOLIC BLOOD PRESSURE: 81 MMHG | BODY MASS INDEX: 33.01 KG/M2 | RESPIRATION RATE: 20 BRPM | OXYGEN SATURATION: 100 %

## 2024-06-11 LAB
ABO + RH BLD: NORMAL
ALBUMIN SERPL-MCNC: 3.4 G/DL (ref 3.5–5)
ALBUMIN/GLOB SERPL: 0.8 (ref 1.1–2.2)
ALP SERPL-CCNC: 90 U/L (ref 45–117)
ALT SERPL-CCNC: 21 U/L (ref 12–78)
AMORPH CRY URNS QL MICRO: ABNORMAL
ANION GAP SERPL CALC-SCNC: 6 MMOL/L (ref 5–15)
APPEARANCE UR: CLEAR
AST SERPL-CCNC: 17 U/L (ref 15–37)
BACTERIA URNS QL MICRO: NEGATIVE /HPF
BILIRUB SERPL-MCNC: 0.5 MG/DL (ref 0.2–1)
BILIRUB UR QL: NEGATIVE
BLOOD GROUP ANTIBODIES SERPL: NORMAL
BUN SERPL-MCNC: 21 MG/DL (ref 6–20)
BUN/CREAT SERPL: 22 (ref 12–20)
CALCIUM SERPL-MCNC: 9.6 MG/DL (ref 8.5–10.1)
CHLORIDE SERPL-SCNC: 106 MMOL/L (ref 97–108)
CO2 SERPL-SCNC: 27 MMOL/L (ref 21–32)
COLOR UR: ABNORMAL
COMMENT:: NORMAL
CREAT SERPL-MCNC: 0.94 MG/DL (ref 0.55–1.02)
ERYTHROCYTE [DISTWIDTH] IN BLOOD BY AUTOMATED COUNT: 13.9 % (ref 11.5–14.5)
EST. AVERAGE GLUCOSE BLD GHB EST-MCNC: 111 MG/DL
GLOBULIN SER CALC-MCNC: 4.1 G/DL (ref 2–4)
GLUCOSE SERPL-MCNC: 93 MG/DL (ref 65–100)
GLUCOSE UR STRIP.AUTO-MCNC: NEGATIVE MG/DL
HBA1C MFR BLD: 5.5 % (ref 4–5.6)
HCT VFR BLD AUTO: 39.5 % (ref 35–47)
HGB BLD-MCNC: 11.9 G/DL (ref 11.5–16)
HGB UR QL STRIP: NEGATIVE
HYALINE CASTS URNS QL MICRO: ABNORMAL /LPF (ref 0–5)
INR PPP: 1 (ref 0.9–1.1)
KETONES UR QL STRIP.AUTO: NEGATIVE MG/DL
LEUKOCYTE ESTERASE UR QL STRIP.AUTO: ABNORMAL
MCH RBC QN AUTO: 26.2 PG (ref 26–34)
MCHC RBC AUTO-ENTMCNC: 30.1 G/DL (ref 30–36.5)
MCV RBC AUTO: 87 FL (ref 80–99)
NITRITE UR QL STRIP.AUTO: NEGATIVE
NRBC # BLD: 0 K/UL (ref 0–0.01)
NRBC BLD-RTO: 0 PER 100 WBC
PH UR STRIP: 6 (ref 5–8)
PLATELET # BLD AUTO: 242 K/UL (ref 150–400)
PMV BLD AUTO: 10.2 FL (ref 8.9–12.9)
POTASSIUM SERPL-SCNC: 4.1 MMOL/L (ref 3.5–5.1)
PROT SERPL-MCNC: 7.5 G/DL (ref 6.4–8.2)
PROT UR STRIP-MCNC: NEGATIVE MG/DL
PROTHROMBIN TIME: 10.9 SEC (ref 9–11.1)
RBC # BLD AUTO: 4.54 M/UL (ref 3.8–5.2)
RBC #/AREA URNS HPF: ABNORMAL /HPF (ref 0–5)
SP GR UR REFRACTOMETRY: 1.01
SPECIMEN EXP DATE BLD: NORMAL
SPECIMEN HOLD: NORMAL
URINE CULTURE IF INDICATED: ABNORMAL
UROBILINOGEN UR QL STRIP.AUTO: 0.2 EU/DL (ref 0.2–1)
WBC # BLD AUTO: 5.2 K/UL (ref 3.6–11)
WBC URNS QL MICRO: ABNORMAL /HPF (ref 0–4)

## 2024-06-11 PROCEDURE — 80053 COMPREHEN METABOLIC PANEL: CPT

## 2024-06-11 PROCEDURE — 86900 BLOOD TYPING SEROLOGIC ABO: CPT

## 2024-06-11 PROCEDURE — 97161 PT EVAL LOW COMPLEX 20 MIN: CPT

## 2024-06-11 PROCEDURE — 83036 HEMOGLOBIN GLYCOSYLATED A1C: CPT

## 2024-06-11 PROCEDURE — 85610 PROTHROMBIN TIME: CPT

## 2024-06-11 PROCEDURE — 86901 BLOOD TYPING SEROLOGIC RH(D): CPT

## 2024-06-11 PROCEDURE — 81001 URINALYSIS AUTO W/SCOPE: CPT

## 2024-06-11 PROCEDURE — 97530 THERAPEUTIC ACTIVITIES: CPT

## 2024-06-11 PROCEDURE — 36415 COLL VENOUS BLD VENIPUNCTURE: CPT

## 2024-06-11 PROCEDURE — 85027 COMPLETE CBC AUTOMATED: CPT

## 2024-06-11 PROCEDURE — APPNB30 APP NON BILLABLE TIME 0-30 MINS: Performed by: NURSE PRACTITIONER

## 2024-06-11 PROCEDURE — 86850 RBC ANTIBODY SCREEN: CPT

## 2024-06-11 RX ORDER — SODIUM CHLORIDE, SODIUM LACTATE, POTASSIUM CHLORIDE, CALCIUM CHLORIDE 600; 310; 30; 20 MG/100ML; MG/100ML; MG/100ML; MG/100ML
INJECTION, SOLUTION INTRAVENOUS CONTINUOUS
OUTPATIENT
Start: 2024-06-20

## 2024-06-11 RX ORDER — CELECOXIB 200 MG/1
400 CAPSULE ORAL ONCE
OUTPATIENT
Start: 2024-06-20

## 2024-06-11 RX ORDER — ACETAMINOPHEN 500 MG
1000 TABLET ORAL ONCE
OUTPATIENT
Start: 2024-06-20

## 2024-06-11 ASSESSMENT — PAIN DESCRIPTION - FREQUENCY: FREQUENCY: INTERMITTENT

## 2024-06-11 ASSESSMENT — PROMIS GLOBAL HEALTH SCALE
TO WHAT EXTENT ARE YOU ABLE TO CARRY OUT YOUR EVERYDAY PHYSICAL ACTIVITIES SUCH AS WALKING, CLIMBING STAIRS, CARRYING GROCERIES, OR MOVING A CHAIR [ON A SCALE OF 1 (NOT AT ALL) TO 5 (COMPLETELY)]?: A LITTLE
IN GENERAL, HOW WOULD YOU RATE YOUR PHYSICAL HEALTH [ON A SCALE OF 1 (POOR) TO 5 (EXCELLENT)]?: GOOD
SUM OF RESPONSES TO QUESTIONS 3, 6, 7, & 8: 17
IN THE PAST 7 DAYS, HOW WOULD YOU RATE YOUR PAIN ON AVERAGE [ON A SCALE FROM 0 (NO PAIN) TO 10 (WORST IMAGINABLE PAIN)]?: 8
IN THE PAST 7 DAYS, HOW OFTEN HAVE YOU BEEN BOTHERED BY EMOTIONAL PROBLEMS, SUCH AS FEELING ANXIOUS, DEPRESSED, OR IRRITABLE [ON A SCALE FROM 1 (NEVER) TO 5 (ALWAYS)]?: NEVER
HOW IS THE PROMIS V1.1 BEING ADMINISTERED?: PAPER
IN GENERAL, HOW WOULD YOU RATE YOUR SATISFACTION WITH YOUR SOCIAL ACTIVITIES AND RELATIONSHIPS [ON A SCALE OF 1 (POOR) TO 5 (EXCELLENT)]?: GOOD
SUM OF RESPONSES TO QUESTIONS 2, 4, 5, & 10: 15
IN GENERAL, PLEASE RATE HOW WELL YOU CARRY OUT YOUR USUAL SOCIAL ACTIVITIES (INCLUDES ACTIVITIES AT HOME, AT WORK, AND IN YOUR COMMUNITY, AND RESPONSIBILITIES AS A PARENT, CHILD, SPOUSE, EMPLOYEE, FRIEND, ETC) [ON A SCALE OF 1 (POOR) TO 5 (EXCELLENT)]?: GOOD
IN THE PAST 7 DAYS, HOW WOULD YOU RATE YOUR FATIGUE ON AVERAGE [ON A SCALE FROM 1 (NONE) TO 5 (VERY SEVERE)]?: MILD
IN GENERAL, HOW WOULD YOU RATE YOUR MENTAL HEALTH, INCLUDING YOUR MOOD AND YOUR ABILITY TO THINK [ON A SCALE OF 1 (POOR) TO 5 (EXCELLENT)]?: VERY GOOD
WHO IS THE PERSON COMPLETING THE PROMIS V1.1 SURVEY?: SELF
IN GENERAL, WOULD YOU SAY YOUR QUALITY OF LIFE IS...[ON A SCALE OF 1 (POOR) TO 5 (EXCELLENT)]: GOOD
IN GENERAL, WOULD YOU SAY YOUR HEALTH IS...[ON A SCALE OF 1 (POOR) TO 5 (EXCELLENT)]: GOOD

## 2024-06-11 ASSESSMENT — HOOS JR
WALKING ON UNEVEN SURFACE: SEVERE
RISING FROM SITTING: MODERATE
GOING UP OR DOWN STAIRS: SEVERE
BENDING TO THE FLOOR TO PICK UP OBJECT: MODERATE
HOOS JR RAW SCORE: 15
LYING IN BED (TURNING OVER, MAINTAINING HIP POSITION): SEVERE
HOOS JR RAW SCORE: 15
HOOS JR TOTAL INTERVAL SCORE: 43.335
SITTING: MODERATE

## 2024-06-11 ASSESSMENT — PAIN DESCRIPTION - DESCRIPTORS: DESCRIPTORS: ACHING

## 2024-06-11 ASSESSMENT — PAIN SCALES - GENERAL: PAINLEVEL_OUTOF10: 8

## 2024-06-11 ASSESSMENT — PAIN DESCRIPTION - ORIENTATION: ORIENTATION: RIGHT

## 2024-06-11 ASSESSMENT — PAIN DESCRIPTION - LOCATION: LOCATION: HIP

## 2024-06-12 LAB
BACTERIA SPEC CULT: NORMAL
BACTERIA SPEC CULT: NORMAL
SERVICE CMNT-IMP: NORMAL

## 2024-06-13 LAB
EKG ATRIAL RATE: 79 BPM
EKG DIAGNOSIS: NORMAL
EKG P AXIS: 71 DEGREES
EKG P-R INTERVAL: 148 MS
EKG Q-T INTERVAL: 366 MS
EKG QRS DURATION: 72 MS
EKG QTC CALCULATION (BAZETT): 419 MS
EKG R AXIS: 13 DEGREES
EKG T AXIS: 60 DEGREES
EKG VENTRICULAR RATE: 79 BPM

## 2024-06-19 ENCOUNTER — ANESTHESIA EVENT (OUTPATIENT)
Facility: HOSPITAL | Age: 74
End: 2024-06-19
Payer: MEDICARE

## 2024-06-19 NOTE — ANESTHESIA PRE PROCEDURE
deconditioning R53.81   • Anxiety F41.9   • Spinal stenosis of lumbar region with neurogenic claudication M48.062   • Multinodular non-toxic goiter E04.2   • Neuropathy G62.9   • Chronic midline low back pain without sciatica M54.50, G89.29   • Venous insufficiency I87.2   • Left atrial enlargement I51.7   • Palpitations R00.2   • Primary osteoarthritis of left knee M17.12   • Prerenal azotemia R79.89   • Insomnia G47.00   • Acute pharyngitis J02.9   • Dysuria R30.0   • Pain of breast N64.4   • Primary osteoarthritis of right hip M16.11   • Spondylolisthesis of lumbar region M43.16   • Vulvitis N76.2       Past Medical History:        Diagnosis Date   • Hypertension    • Left atrial enlargement    • Osteoarthritis    • Spinal stenosis    • Thyroid nodule     annual eval with US   • Venous insufficiency        Past Surgical History:        Procedure Laterality Date   • COLONOSCOPY     • LAPAROSCOPY      exploratory, nothing excised or discovered   • MOUTH SURGERY      periodontal flap surgery       Social History:    Social History     Tobacco Use   • Smoking status: Never     Passive exposure: Never   • Smokeless tobacco: Never   Substance Use Topics   • Alcohol use: Yes     Alcohol/week: 1.7 standard drinks of alcohol     Comment: occasionally                                Counseling given: Not Answered      Vital Signs (Current): There were no vitals filed for this visit.                                           BP Readings from Last 3 Encounters:   06/11/24 (!) 157/81   06/05/24 (!) 150/88   05/20/24 136/74       NPO Status:                                                                                 BMI:   Wt Readings from Last 3 Encounters:   06/11/24 87.7 kg (193 lb 5.5 oz)   05/30/24 89.1 kg (196 lb 8 oz)   05/20/24 88 kg (194 lb)     There is no height or weight on file to calculate BMI.    CBC:   Lab Results   Component Value Date/Time    WBC 5.2 06/11/2024 10:27 AM    RBC 4.54 06/11/2024 10:27 AM

## 2024-06-19 NOTE — DISCHARGE INSTRUCTIONS
Discharge Instructions:  Bita Moreno    Surgery: TOTAL HIP REPLACEMENT.        To relieve pain:  Use ice/gel packs.    -Put the ice pack directly over the wound, or anywhere you are hurting or swollen.   -To control pain and swelling, keep ice on regularly, especially after physical activity.  -The packs should stay cold for 3-4 hours.  When it is not cold anymore, rotate with the packs in the freezer.      Elevate your leg.  This will also keep swelling down.    Rest for at least 20 minutes between activity or exercises.    To keep track of your pain medications, write down what you take and when you take it.    The last dose of pain medication you got in the hospital was:     Medication    Dose    Date & Time      Choose your medications based on the pain scale below:    To keep your pain under control, take Tylenol every 6 hours for 14 days - even if you feel like you don’t need it.     For mild to moderate pain (4-6 on pain scale), take one pain pill every 4 hours or as instructed.     For severe pain (7-10 on pain scale), take two pain pills every 4 hours or as instructed.     To prevent nausea, take your pain medications with food.                                            Pain Scale              As your pain lessens:    Slowly start taking less pain medication. You may do this by waiting longer between doses or by taking smaller doses.    Stop using the pain medications as soon as you no longer need it, usually in 2-3 weeks.        Aspirin  To prevent blood clots, you will need to take Aspirin 81 mg twice a day for 30 days.              To prevent stomach upset or bleeding:  Take Pepcid 20 mg twice a day, or a similar home medication, while you are taking a blood thinner.         Keep your waterproof dressing in place. It will be removed by your surgeon during your follow-up appointment in 2 weeks.     You may need to change the dressing if you are having drainage to where the dressing is no longer

## 2024-06-20 ENCOUNTER — ANESTHESIA (OUTPATIENT)
Facility: HOSPITAL | Age: 74
End: 2024-06-20
Payer: MEDICARE

## 2024-06-20 ENCOUNTER — HOSPITAL ENCOUNTER (OUTPATIENT)
Facility: HOSPITAL | Age: 74
Setting detail: OBSERVATION
Discharge: HOME | End: 2024-06-20
Attending: ORTHOPAEDIC SURGERY | Admitting: ORTHOPAEDIC SURGERY
Payer: MEDICARE

## 2024-06-20 ENCOUNTER — APPOINTMENT (OUTPATIENT)
Facility: HOSPITAL | Age: 74
End: 2024-06-20
Attending: ORTHOPAEDIC SURGERY
Payer: MEDICARE

## 2024-06-20 VITALS
DIASTOLIC BLOOD PRESSURE: 84 MMHG | SYSTOLIC BLOOD PRESSURE: 154 MMHG | BODY MASS INDEX: 31.85 KG/M2 | HEIGHT: 65 IN | HEART RATE: 80 BPM | OXYGEN SATURATION: 99 % | TEMPERATURE: 97.7 F | RESPIRATION RATE: 16 BRPM | WEIGHT: 191.14 LBS

## 2024-06-20 DIAGNOSIS — Z96.641 S/P TOTAL RIGHT HIP ARTHROPLASTY: Primary | ICD-10-CM

## 2024-06-20 PROCEDURE — 2580000003 HC RX 258: Performed by: STUDENT IN AN ORGANIZED HEALTH CARE EDUCATION/TRAINING PROGRAM

## 2024-06-20 PROCEDURE — 6360000002 HC RX W HCPCS: Performed by: ANESTHESIOLOGY

## 2024-06-20 PROCEDURE — 3600000015 HC SURGERY LEVEL 5 ADDTL 15MIN: Performed by: ORTHOPAEDIC SURGERY

## 2024-06-20 PROCEDURE — 3700000000 HC ANESTHESIA ATTENDED CARE: Performed by: ORTHOPAEDIC SURGERY

## 2024-06-20 PROCEDURE — 2720000010 HC SURG SUPPLY STERILE: Performed by: ORTHOPAEDIC SURGERY

## 2024-06-20 PROCEDURE — 2580000003 HC RX 258: Performed by: PHYSICIAN ASSISTANT

## 2024-06-20 PROCEDURE — 3700000001 HC ADD 15 MINUTES (ANESTHESIA): Performed by: ORTHOPAEDIC SURGERY

## 2024-06-20 PROCEDURE — 97167 OT EVAL HIGH COMPLEX 60 MIN: CPT | Performed by: OCCUPATIONAL THERAPIST

## 2024-06-20 PROCEDURE — 6360000002 HC RX W HCPCS: Performed by: ORTHOPAEDIC SURGERY

## 2024-06-20 PROCEDURE — 97530 THERAPEUTIC ACTIVITIES: CPT

## 2024-06-20 PROCEDURE — 3600000005 HC SURGERY LEVEL 5 BASE: Performed by: ORTHOPAEDIC SURGERY

## 2024-06-20 PROCEDURE — 7100000000 HC PACU RECOVERY - FIRST 15 MIN: Performed by: ORTHOPAEDIC SURGERY

## 2024-06-20 PROCEDURE — 6370000000 HC RX 637 (ALT 250 FOR IP): Performed by: ORTHOPAEDIC SURGERY

## 2024-06-20 PROCEDURE — G0378 HOSPITAL OBSERVATION PER HR: HCPCS

## 2024-06-20 PROCEDURE — 2500000003 HC RX 250 WO HCPCS: Performed by: NURSE ANESTHETIST, CERTIFIED REGISTERED

## 2024-06-20 PROCEDURE — 64447 NJX AA&/STRD FEMORAL NRV IMG: CPT | Performed by: ANESTHESIOLOGY

## 2024-06-20 PROCEDURE — 97535 SELF CARE MNGMENT TRAINING: CPT | Performed by: OCCUPATIONAL THERAPIST

## 2024-06-20 PROCEDURE — 97116 GAIT TRAINING THERAPY: CPT

## 2024-06-20 PROCEDURE — APPNB180 APP NON BILLABLE TIME > 60 MINS

## 2024-06-20 PROCEDURE — 2709999900 HC NON-CHARGEABLE SUPPLY: Performed by: ORTHOPAEDIC SURGERY

## 2024-06-20 PROCEDURE — 6370000000 HC RX 637 (ALT 250 FOR IP): Performed by: PHYSICIAN ASSISTANT

## 2024-06-20 PROCEDURE — 6360000002 HC RX W HCPCS: Performed by: PHYSICIAN ASSISTANT

## 2024-06-20 PROCEDURE — 2580000003 HC RX 258: Performed by: ORTHOPAEDIC SURGERY

## 2024-06-20 PROCEDURE — 7100000001 HC PACU RECOVERY - ADDTL 15 MIN: Performed by: ORTHOPAEDIC SURGERY

## 2024-06-20 PROCEDURE — 6360000002 HC RX W HCPCS: Performed by: NURSE ANESTHETIST, CERTIFIED REGISTERED

## 2024-06-20 PROCEDURE — 97162 PT EVAL MOD COMPLEX 30 MIN: CPT

## 2024-06-20 RX ORDER — DIPHENHYDRAMINE HCL 25 MG
25 CAPSULE ORAL EVERY 6 HOURS PRN
Status: DISCONTINUED | OUTPATIENT
Start: 2024-06-20 | End: 2024-06-20 | Stop reason: HOSPADM

## 2024-06-20 RX ORDER — ONDANSETRON 2 MG/ML
4 INJECTION INTRAMUSCULAR; INTRAVENOUS EVERY 6 HOURS PRN
Status: DISCONTINUED | OUTPATIENT
Start: 2024-06-20 | End: 2024-06-20 | Stop reason: HOSPADM

## 2024-06-20 RX ORDER — FAMOTIDINE 20 MG/1
20 TABLET, FILM COATED ORAL DAILY
Qty: 30 TABLET | Refills: 0 | Status: SHIPPED | OUTPATIENT
Start: 2024-06-20 | End: 2024-06-20

## 2024-06-20 RX ORDER — ACETAMINOPHEN 500 MG
1000 TABLET ORAL ONCE
Status: COMPLETED | OUTPATIENT
Start: 2024-06-20 | End: 2024-06-20

## 2024-06-20 RX ORDER — BISACODYL 10 MG
10 SUPPOSITORY, RECTAL RECTAL DAILY PRN
Status: DISCONTINUED | OUTPATIENT
Start: 2024-06-21 | End: 2024-06-20 | Stop reason: HOSPADM

## 2024-06-20 RX ORDER — SODIUM CHLORIDE 0.9 % (FLUSH) 0.9 %
5-40 SYRINGE (ML) INJECTION EVERY 12 HOURS SCHEDULED
Status: DISCONTINUED | OUTPATIENT
Start: 2024-06-20 | End: 2024-06-20 | Stop reason: HOSPADM

## 2024-06-20 RX ORDER — SODIUM CHLORIDE, SODIUM LACTATE, POTASSIUM CHLORIDE, CALCIUM CHLORIDE 600; 310; 30; 20 MG/100ML; MG/100ML; MG/100ML; MG/100ML
INJECTION, SOLUTION INTRAVENOUS CONTINUOUS
Status: DISCONTINUED | OUTPATIENT
Start: 2024-06-20 | End: 2024-06-20 | Stop reason: HOSPADM

## 2024-06-20 RX ORDER — ASPIRIN 81 MG/1
81 TABLET ORAL 2 TIMES DAILY
Status: DISCONTINUED | OUTPATIENT
Start: 2024-06-20 | End: 2024-06-20 | Stop reason: HOSPADM

## 2024-06-20 RX ORDER — NALOXONE HYDROCHLORIDE 0.4 MG/ML
INJECTION, SOLUTION INTRAMUSCULAR; INTRAVENOUS; SUBCUTANEOUS PRN
Status: DISCONTINUED | OUTPATIENT
Start: 2024-06-20 | End: 2024-06-20 | Stop reason: HOSPADM

## 2024-06-20 RX ORDER — ONDANSETRON 2 MG/ML
INJECTION INTRAMUSCULAR; INTRAVENOUS PRN
Status: DISCONTINUED | OUTPATIENT
Start: 2024-06-20 | End: 2024-06-20 | Stop reason: SDUPTHER

## 2024-06-20 RX ORDER — TRAMADOL HYDROCHLORIDE 50 MG/1
50 TABLET ORAL EVERY 6 HOURS PRN
Status: DISCONTINUED | OUTPATIENT
Start: 2024-06-20 | End: 2024-06-20 | Stop reason: HOSPADM

## 2024-06-20 RX ORDER — ASPIRIN 81 MG/1
81 TABLET ORAL 2 TIMES DAILY
Qty: 60 TABLET | Refills: 0 | Status: SHIPPED | OUTPATIENT
Start: 2024-06-20 | End: 2024-06-20

## 2024-06-20 RX ORDER — ACETAMINOPHEN 325 MG/1
650 TABLET ORAL EVERY 6 HOURS
Status: DISCONTINUED | OUTPATIENT
Start: 2024-06-20 | End: 2024-06-20 | Stop reason: HOSPADM

## 2024-06-20 RX ORDER — SENNA AND DOCUSATE SODIUM 50; 8.6 MG/1; MG/1
1 TABLET, FILM COATED ORAL 2 TIMES DAILY
Qty: 28 TABLET | Refills: 0 | Status: SHIPPED | OUTPATIENT
Start: 2024-06-20 | End: 2024-06-20

## 2024-06-20 RX ORDER — FAMOTIDINE 20 MG/1
20 TABLET, FILM COATED ORAL 2 TIMES DAILY
Status: DISCONTINUED | OUTPATIENT
Start: 2024-06-20 | End: 2024-06-20 | Stop reason: HOSPADM

## 2024-06-20 RX ORDER — ROPIVACAINE HYDROCHLORIDE 5 MG/ML
INJECTION, SOLUTION EPIDURAL; INFILTRATION; PERINEURAL PRN
Status: DISCONTINUED | OUTPATIENT
Start: 2024-06-20 | End: 2024-06-20 | Stop reason: ALTCHOICE

## 2024-06-20 RX ORDER — MIDAZOLAM HYDROCHLORIDE 1 MG/ML
INJECTION INTRAMUSCULAR; INTRAVENOUS PRN
Status: DISCONTINUED | OUTPATIENT
Start: 2024-06-20 | End: 2024-06-20 | Stop reason: SDUPTHER

## 2024-06-20 RX ORDER — SODIUM CHLORIDE 0.9 % (FLUSH) 0.9 %
5-40 SYRINGE (ML) INJECTION PRN
Status: DISCONTINUED | OUTPATIENT
Start: 2024-06-20 | End: 2024-06-20 | Stop reason: HOSPADM

## 2024-06-20 RX ORDER — SODIUM CHLORIDE 9 MG/ML
INJECTION, SOLUTION INTRAVENOUS CONTINUOUS
Status: DISCONTINUED | OUTPATIENT
Start: 2024-06-20 | End: 2024-06-20 | Stop reason: HOSPADM

## 2024-06-20 RX ORDER — ONDANSETRON 4 MG/1
4 TABLET, ORALLY DISINTEGRATING ORAL EVERY 8 HOURS PRN
Status: DISCONTINUED | OUTPATIENT
Start: 2024-06-20 | End: 2024-06-20 | Stop reason: HOSPADM

## 2024-06-20 RX ORDER — SODIUM CHLORIDE 9 MG/ML
INJECTION, SOLUTION INTRAVENOUS PRN
Status: DISCONTINUED | OUTPATIENT
Start: 2024-06-20 | End: 2024-06-20 | Stop reason: HOSPADM

## 2024-06-20 RX ORDER — FENTANYL CITRATE 50 UG/ML
INJECTION, SOLUTION INTRAMUSCULAR; INTRAVENOUS PRN
Status: DISCONTINUED | OUTPATIENT
Start: 2024-06-20 | End: 2024-06-20 | Stop reason: SDUPTHER

## 2024-06-20 RX ORDER — MORPHINE SULFATE 2 MG/ML
2 INJECTION, SOLUTION INTRAMUSCULAR; INTRAVENOUS
Status: DISCONTINUED | OUTPATIENT
Start: 2024-06-20 | End: 2024-06-20 | Stop reason: HOSPADM

## 2024-06-20 RX ORDER — PROPOFOL 10 MG/ML
INJECTION, EMULSION INTRAVENOUS CONTINUOUS PRN
Status: DISCONTINUED | OUTPATIENT
Start: 2024-06-20 | End: 2024-06-20 | Stop reason: SDUPTHER

## 2024-06-20 RX ORDER — HYDROMORPHONE HYDROCHLORIDE 1 MG/ML
0.25 INJECTION, SOLUTION INTRAMUSCULAR; INTRAVENOUS; SUBCUTANEOUS EVERY 5 MIN PRN
Status: DISCONTINUED | OUTPATIENT
Start: 2024-06-20 | End: 2024-06-20 | Stop reason: HOSPADM

## 2024-06-20 RX ORDER — PHENYLEPHRINE HCL IN 0.9% NACL 0.4MG/10ML
SYRINGE (ML) INTRAVENOUS PRN
Status: DISCONTINUED | OUTPATIENT
Start: 2024-06-20 | End: 2024-06-20 | Stop reason: SDUPTHER

## 2024-06-20 RX ORDER — ACETAMINOPHEN 650 MG
TABLET, EXTENDED RELEASE ORAL PRN
Status: DISCONTINUED | OUTPATIENT
Start: 2024-06-20 | End: 2024-06-20 | Stop reason: ALTCHOICE

## 2024-06-20 RX ORDER — FENTANYL CITRATE 50 UG/ML
50 INJECTION, SOLUTION INTRAMUSCULAR; INTRAVENOUS EVERY 5 MIN PRN
Status: COMPLETED | OUTPATIENT
Start: 2024-06-20 | End: 2024-06-20

## 2024-06-20 RX ORDER — ASPIRIN 81 MG/1
81 TABLET ORAL 2 TIMES DAILY
Qty: 60 TABLET | Refills: 0 | Status: SHIPPED | OUTPATIENT
Start: 2024-06-20 | End: 2024-07-20

## 2024-06-20 RX ORDER — POLYETHYLENE GLYCOL 3350 17 G/17G
17 POWDER, FOR SOLUTION ORAL DAILY
Status: DISCONTINUED | OUTPATIENT
Start: 2024-06-20 | End: 2024-06-20 | Stop reason: HOSPADM

## 2024-06-20 RX ORDER — DEXAMETHASONE SODIUM PHOSPHATE 4 MG/ML
INJECTION, SOLUTION INTRA-ARTICULAR; INTRALESIONAL; INTRAMUSCULAR; INTRAVENOUS; SOFT TISSUE PRN
Status: DISCONTINUED | OUTPATIENT
Start: 2024-06-20 | End: 2024-06-20 | Stop reason: SDUPTHER

## 2024-06-20 RX ORDER — SENNA AND DOCUSATE SODIUM 50; 8.6 MG/1; MG/1
1 TABLET, FILM COATED ORAL 2 TIMES DAILY
Status: DISCONTINUED | OUTPATIENT
Start: 2024-06-20 | End: 2024-06-20 | Stop reason: HOSPADM

## 2024-06-20 RX ORDER — TRANEXAMIC ACID 100 MG/ML
INJECTION, SOLUTION INTRAVENOUS PRN
Status: DISCONTINUED | OUTPATIENT
Start: 2024-06-20 | End: 2024-06-20 | Stop reason: SDUPTHER

## 2024-06-20 RX ORDER — TRAMADOL HYDROCHLORIDE 50 MG/1
100 TABLET ORAL EVERY 6 HOURS PRN
Status: DISCONTINUED | OUTPATIENT
Start: 2024-06-20 | End: 2024-06-20 | Stop reason: HOSPADM

## 2024-06-20 RX ORDER — CYCLOBENZAPRINE HCL 10 MG
10 TABLET ORAL EVERY 12 HOURS PRN
Status: DISCONTINUED | OUTPATIENT
Start: 2024-06-20 | End: 2024-06-20 | Stop reason: HOSPADM

## 2024-06-20 RX ORDER — FAMOTIDINE 20 MG/1
20 TABLET, FILM COATED ORAL DAILY
Qty: 30 TABLET | Refills: 0 | Status: SHIPPED | OUTPATIENT
Start: 2024-06-20 | End: 2024-07-20

## 2024-06-20 RX ORDER — ROPIVACAINE HYDROCHLORIDE 5 MG/ML
INJECTION, SOLUTION EPIDURAL; INFILTRATION; PERINEURAL PRN
Status: DISCONTINUED | OUTPATIENT
Start: 2024-06-20 | End: 2024-06-20 | Stop reason: SDUPTHER

## 2024-06-20 RX ORDER — DIPHENHYDRAMINE HYDROCHLORIDE 50 MG/ML
25 INJECTION INTRAMUSCULAR; INTRAVENOUS EVERY 6 HOURS PRN
Status: DISCONTINUED | OUTPATIENT
Start: 2024-06-20 | End: 2024-06-20 | Stop reason: HOSPADM

## 2024-06-20 RX ORDER — PROCHLORPERAZINE EDISYLATE 5 MG/ML
5 INJECTION INTRAMUSCULAR; INTRAVENOUS
Status: DISCONTINUED | OUTPATIENT
Start: 2024-06-20 | End: 2024-06-20 | Stop reason: HOSPADM

## 2024-06-20 RX ORDER — 0.9 % SODIUM CHLORIDE 0.9 %
500 INTRAVENOUS SOLUTION INTRAVENOUS
Status: DISCONTINUED | OUTPATIENT
Start: 2024-06-20 | End: 2024-06-20 | Stop reason: HOSPADM

## 2024-06-20 RX ORDER — CELECOXIB 200 MG/1
400 CAPSULE ORAL ONCE
Status: COMPLETED | OUTPATIENT
Start: 2024-06-20 | End: 2024-06-20

## 2024-06-20 RX ORDER — TRAMADOL HYDROCHLORIDE 50 MG/1
50-100 TABLET ORAL EVERY 6 HOURS PRN
Qty: 40 TABLET | Refills: 0 | Status: SHIPPED | OUTPATIENT
Start: 2024-06-20 | End: 2024-06-20

## 2024-06-20 RX ORDER — ACETAMINOPHEN 325 MG/1
650 TABLET ORAL EVERY 6 HOURS
Qty: 120 TABLET | Refills: 3 | Status: SHIPPED | OUTPATIENT
Start: 2024-06-20

## 2024-06-20 RX ORDER — IBUPROFEN 400 MG/1
400 TABLET ORAL EVERY 12 HOURS
Status: DISCONTINUED | OUTPATIENT
Start: 2024-06-20 | End: 2024-06-20 | Stop reason: HOSPADM

## 2024-06-20 RX ORDER — SENNA AND DOCUSATE SODIUM 50; 8.6 MG/1; MG/1
1 TABLET, FILM COATED ORAL 2 TIMES DAILY
Qty: 28 TABLET | Refills: 0 | Status: SHIPPED | OUTPATIENT
Start: 2024-06-20 | End: 2024-07-04

## 2024-06-20 RX ORDER — TRAMADOL HYDROCHLORIDE 50 MG/1
50-100 TABLET ORAL EVERY 6 HOURS PRN
Qty: 40 TABLET | Refills: 0 | Status: SHIPPED | OUTPATIENT
Start: 2024-06-20 | End: 2024-06-27

## 2024-06-20 RX ADMIN — ACETAMINOPHEN 650 MG: 325 TABLET ORAL at 14:23

## 2024-06-20 RX ADMIN — IBUPROFEN 400 MG: 400 TABLET, FILM COATED ORAL at 14:23

## 2024-06-20 RX ADMIN — CELECOXIB 400 MG: 200 CAPSULE ORAL at 06:21

## 2024-06-20 RX ADMIN — Medication 80 MCG: at 08:32

## 2024-06-20 RX ADMIN — Medication 100 MCG: at 09:11

## 2024-06-20 RX ADMIN — SODIUM CHLORIDE, POTASSIUM CHLORIDE, SODIUM LACTATE AND CALCIUM CHLORIDE: 600; 310; 30; 20 INJECTION, SOLUTION INTRAVENOUS at 08:13

## 2024-06-20 RX ADMIN — Medication 50 MCG: at 09:05

## 2024-06-20 RX ADMIN — MIDAZOLAM HYDROCHLORIDE 2 MG: 1 INJECTION, SOLUTION INTRAMUSCULAR; INTRAVENOUS at 08:00

## 2024-06-20 RX ADMIN — Medication 3 AMPULE: at 06:21

## 2024-06-20 RX ADMIN — TRANEXAMIC ACID 1000 MG: 100 INJECTION, SOLUTION INTRAVENOUS at 09:07

## 2024-06-20 RX ADMIN — ACETAMINOPHEN 1000 MG: 500 TABLET ORAL at 06:21

## 2024-06-20 RX ADMIN — SODIUM CHLORIDE, POTASSIUM CHLORIDE, SODIUM LACTATE AND CALCIUM CHLORIDE: 600; 310; 30; 20 INJECTION, SOLUTION INTRAVENOUS at 06:54

## 2024-06-20 RX ADMIN — WATER 2000 MG: 1 INJECTION INTRAMUSCULAR; INTRAVENOUS; SUBCUTANEOUS at 08:30

## 2024-06-20 RX ADMIN — FENTANYL CITRATE 50 MCG: 50 INJECTION, SOLUTION INTRAMUSCULAR; INTRAVENOUS at 08:00

## 2024-06-20 RX ADMIN — ONDANSETRON 4 MG: 2 INJECTION INTRAMUSCULAR; INTRAVENOUS at 09:07

## 2024-06-20 RX ADMIN — MEPIVACAINE HYDROCHLORIDE 2.6 ML: 20 INJECTION, SOLUTION EPIDURAL; INFILTRATION at 08:08

## 2024-06-20 RX ADMIN — ROPIVACAINE HYDROCHLORIDE 20 ML: 5 INJECTION, SOLUTION EPIDURAL; INFILTRATION; PERINEURAL at 08:12

## 2024-06-20 RX ADMIN — FENTANYL CITRATE 50 MCG: 50 INJECTION INTRAMUSCULAR; INTRAVENOUS at 11:46

## 2024-06-20 RX ADMIN — SENNOSIDES AND DOCUSATE SODIUM 1 TABLET: 50; 8.6 TABLET ORAL at 14:23

## 2024-06-20 RX ADMIN — TRANEXAMIC ACID 1000 MG: 100 INJECTION, SOLUTION INTRAVENOUS at 08:30

## 2024-06-20 RX ADMIN — WATER 2000 MG: 1 INJECTION INTRAMUSCULAR; INTRAVENOUS; SUBCUTANEOUS at 16:08

## 2024-06-20 RX ADMIN — Medication 80 MCG: at 08:52

## 2024-06-20 RX ADMIN — FENTANYL CITRATE 50 MCG: 50 INJECTION INTRAMUSCULAR; INTRAVENOUS at 11:26

## 2024-06-20 RX ADMIN — Medication 80 MCG: at 08:44

## 2024-06-20 RX ADMIN — DEXAMETHASONE SODIUM PHOSPHATE 8 MG: 4 INJECTION INTRA-ARTICULAR; INTRALESIONAL; INTRAMUSCULAR; INTRAVENOUS; SOFT TISSUE at 08:30

## 2024-06-20 RX ADMIN — TRAMADOL HYDROCHLORIDE 50 MG: 50 TABLET ORAL at 10:37

## 2024-06-20 RX ADMIN — PROPOFOL 50 MCG/KG/MIN: 10 INJECTION, EMULSION INTRAVENOUS at 08:18

## 2024-06-20 ASSESSMENT — PAIN DESCRIPTION - DESCRIPTORS
DESCRIPTORS: OTHER (COMMENT)
DESCRIPTORS: ACHING
DESCRIPTORS: ACHING

## 2024-06-20 ASSESSMENT — PAIN DESCRIPTION - LOCATION
LOCATION: BACK
LOCATION: HIP
LOCATION: HIP

## 2024-06-20 ASSESSMENT — PAIN DESCRIPTION - ORIENTATION
ORIENTATION: POSTERIOR;LOWER
ORIENTATION: RIGHT
ORIENTATION: RIGHT

## 2024-06-20 ASSESSMENT — PAIN SCALES - GENERAL
PAINLEVEL_OUTOF10: 5
PAINLEVEL_OUTOF10: 4
PAINLEVEL_OUTOF10: 5
PAINLEVEL_OUTOF10: 7

## 2024-06-20 NOTE — OP NOTE
complications.    Additional Procedure:   Date: 6/20/2024    Preoperative diagnosis: DJD hip    Postoperative diagnosis: same    Procedure performed: Procedure(s):  RIGHT TOTAL HIP ARTHROPLASTY ANTERIOR APPROACH    Anesthesia: Choice    Surgeon(s) and Role:     * Luis Marquez MD - Primary      This describes the use of intraoperative fluoroscopy. Fluoroscopic imaging was utilized in orthogonal planes as well as using live technique for all phases of the procedure, described separately in the operative report, including hardware placement.    Luis Marquez MD

## 2024-06-20 NOTE — CARE COORDINATION
CM completed chart review; pt presented for pre-planned surgery. [facility] health had been referred during preop planning, CM opened referral in [facility], sending order and clinicals when available, AVS updated with contact information for HH. Per chart review, pt has own RW. CM following for additions or changes to discharge plan.       Transportation for d/c: family   Support post d/c: family   Does pt own DME needed for d/c: yes, RW   Accepting HH agency: [facility]   o [facility] offered: arranged preop      Sunitha Vazquez LMSW  Care Management  [phone]

## 2024-06-20 NOTE — ANESTHESIA PROCEDURE NOTES
Peripheral Block    Patient location during procedure: pre-op  Reason for block: post-op pain management  Start time: 6/20/2024 8:09 AM  End time: 6/20/2024 8:12 AM  Staffing  Anesthesiologist: Keegan Hoover MD  Performed by: Keegan Hoover MD  Authorized by: Keegan Hoover MD    Peripheral Block   Patient position: supine  Block type: PENG  Laterality: right  Injection technique: single-shot  Guidance: ultrasound guided    Needle   Needle type: insulated echogenic nerve stimulator needle   Needle gauge: 20 G  Needle localization: ultrasound guidance  Assessment   Injection assessment: negative aspiration for heme and no intravascular symptoms  Slow fractionated injection: yes  Hemodynamics: stable  Outcomes: uncomplicated

## 2024-06-20 NOTE — FLOWSHEET NOTE
06/20/24 1016   Family Communication    Relationship to Patient Daughter  (post op update)   Family/Significant Other Update Called   Delivery Origin Nurse   Update Given Yes   Family Communication   Family Update Message Other (comment)         [phone] awaiting return call from floor pt's daughter updated.

## 2024-06-20 NOTE — ANESTHESIA POSTPROCEDURE EVALUATION
Department of Anesthesiology  Postprocedure Note    Patient: Bita Moreno  MRN: [id number]  YOB: 1950  Date of evaluation: 6/20/2024    Procedure Summary       Date: 06/20/24 Room / Location: \[facility]"" MAIN OR  / \[facility]\"" MAIN OR    Anesthesia Start: 0816 Anesthesia Stop: 0935    Procedure: RIGHT TOTAL HIP ARTHROPLASTY ANTERIOR APPROACH (Right: Hip) Diagnosis:       Primary osteoarthritis of right hip      (Primary osteoarthritis of right hip [M16.11])    Providers: Luis Marquez MD Responsible Provider: Keegan Hoover MD    Anesthesia Type: Regional, MAC, Spinal ASA Status: 2            Anesthesia Type: Regional, MAC, Spinal    Long Phase I: Long Score: 9    Long Phase II:      Anesthesia Post Evaluation    Patient location during evaluation: PACU  Patient participation: complete - patient participated  Level of consciousness: sleepy but conscious and responsive to verbal stimuli  Airway patency: patent  Nausea & Vomiting: no vomiting and no nausea  Cardiovascular status: blood pressure returned to baseline and hemodynamically stable  Respiratory status: acceptable  Hydration status: stable  Pain management: adequate    No notable events documented.

## 2024-06-20 NOTE — ANESTHESIA PROCEDURE NOTES
Spinal Block    End time: 6/20/2024 8:08 AM  Reason for block: primary anesthetic  Staffing  Performed: anesthesiologist   Anesthesiologist: Keegan Hoover MD  Performed by: Keegan Hoover MD  Authorized by: Keegan Hoover MD    Spinal Block  Patient position: sitting  Prep: DuraPrep  Patient monitoring: cardiac monitor, continuous pulse ox, frequent blood pressure checks and oxygen  Approach: midline  Location: L4/L5  Provider prep: mask, sterile gloves and sterile gown  Needle  Needle type: Quincke   Needle gauge: 22 G (First attempt with 25G Quincke)  Needle length: 4 in  Assessment  Sensory level: T4  Swirl obtained: Yes  CSF: clear  Attempts: 2  Hemodynamics: stable  Additional Notes  2.6 mL  2.0% Mepivacaine deposited into CSF.  Preanesthetic Checklist  Completed: patient identified, IV checked, site marked, risks and benefits discussed, surgical/procedural consents, equipment checked, pre-op evaluation, timeout performed, anesthesia consent given, oxygen available, monitors applied/VS acknowledged, fire risk safety assessment completed and verbalized and blood product R/B/A discussed and consented

## 2024-06-20 NOTE — DISCHARGE SUMMARY
Ortho Discharge Summary    Patient ID:  Bita Moreno  [id number]  female  73 y.o.  1950    Admit date: 2024    Discharge date: 2024    Admitting Physician: Luis Marquez MD     Consulting Physician(s):   Treatment Team: Attending Provider: Luis Marquez MD; Surgeon: Luis Marquez MD; Physical Therapist: Gretchen Neely PT; Occupational Therapist: Emily Reyes, OTR/L; : Sunitha Vazquez    Date of Surgery:   2024     Preoperative Diagnosis:  Primary osteoarthritis of right hip [M16.11]    Postoperative Diagnosis:   * No post-op diagnosis entered *    Procedure(s):   RIGHT TOTAL HIP ARTHROPLASTY ANTERIOR APPROACH     Anesthesia Type:   Choice     Surgeon: Luis Marquez MD                            HPI:  Pt is a 73 y.o. female who has a history of Primary osteoarthritis of right hip [M16.11]  with pain and limitations of activities of daily living who presents at this time for a  RIGHT TOTAL HIP ARTHROPLASTY ANTERIOR APPROACH following the failure of conservative management.    PMH:   Past Medical History:   Diagnosis Date    Hypertension     Left atrial enlargement     Osteoarthritis     Spinal stenosis     Thyroid nodule     annual eval with US    Venous insufficiency        Body mass index is 31.81 kg/m². : A BMI > 30 is classified as obesity and > 40 is classified as morbid obesity.     Medications upon admission :   Prior to Admission Medications   Prescriptions Last Dose Informant Patient Reported? Taking?   COLLAGEN PO Past Week  Yes No   Sig: by Other route daily 1 scoop in coffee daily   Calcium Carb-Cholecalciferol (CALCIUM 600 + D PO) Past Week  Yes No   Si TAB(S) ORALLY QD for 30 DAY(S)   Multiple Vitamin (MULTIVITAMIN ADULT PO) Past Week  Yes No   Sig: Take 1 tablet by mouth daily   Omega-3 Fatty Acids (FISH OIL) 500 MG CAPS Past Week  Yes No   Sig: daily   Turmeric (QC TUMERIC COMPLEX PO) Past Week  Yes No   Sig: QD   meloxicam (MOBIC) 15 MG tablet Past Week

## 2024-06-20 NOTE — FLOWSHEET NOTE
06/20/24 0951   Handoff   Communication Given Periop Handoff/Relief   Handoff phase Phase I receiving   Handoff Given To Jolanta LANGLEY   Handoff Received From Vaughn LANGLEY & Diandra COFFEY   Handoff Communication Face to Face   Time Handoff Given 0940

## 2024-06-20 NOTE — H&P
Date of Surgery Update:  Bita Moreno was seen and examined on the day of surgery prior to the procedure.    There were no significant clinical changes since the completion of the History and Physical.    Exam today prior to surgery showed no acute cardiac findings, no respiratory difficulty, and no abdominal complaints or pain.     Documentation of Medical Necessity:    Symptoms: pain with activity and at rest, antalgia, interferes with ADLs    Conservative Treatment: activity modification, multiple medications, injection    Physical Findings: painful AROM/PROM, antalgia on ambulation, no trochanteric pain    See PCP/Cardiology/PAT/Anesthesia record for cardiopulmonary evaluation.    Imaging: significant OA, sclerosis and osteophytes    Indications:   Failure of conservative treatments with daily pain and functional limitations.   Appropriate imaging demonstrating significant disease.  Appropriate physical findings consistent with significant degenerative joint disease.    All pertinent risks, benefits, and alternatives to operative management including continued conservative care were explained at length. The patient has elected to proceed with appropriately indicated and medically necessary total joint arthroplasty. They understand no guarantees can be given about the outcome.    Signed By: JULIUS Garner     June 20, 2024 7:50 AM        
encounter: 5' 5\".  Weight as of this encounter: 195 lb.   Gait / Assistive devices: Gait is antalgic and assisted by a rollator.  Eyes: Sclera are nonicteric.  Respiratory: No labored breathing.  Cardiovascular: No marked edema. Well perfused extremities bilaterally.  Skin: No marked skin ulcers. No lymphedema or skin abnormalities.  Neurological: No marked sensory loss noted. Grossly neurovascularly intact. Both lower extremities are intact to distal sensory and motor function.  Psychiatric: Alert and oriented x3.    MUSCULOSKELETAL: Extremely limited range of motion of the right hip. Attempted range of motion causes pain. Neuromotor exam distally is intact.    Imaging:   X-ray hip right 2-3 views w/pelvis when performed ([id number])    Result Date: 5/28/2024  Weight Bearing. AP, Frog Lateral.     Impression: Severe right hip arthritis with complete loss of joint space and coxa profunda. Noticeable progression from films in 10/2023.     Assessment:     ICD-10-CM   1. Primary osteoarthritis of right hip M16.11   2. Hypertension, unspecified type I10     Plan:   At this time, I recommended a right DONNELL due to the severity of her arthritis. She consents to the treatment and scheduled a date for surgery. Conservative treatments including activity modification, exercise/therapy, medication, and/or injection have not successfully relieved pain. Surgery was discussed at length today. We went over all the pertinent risks, including but not limited to blood clot, infection, nerve/vessel damage, fracture, mechanical failure, other medical/anesthesia related complications, and failure to provide complete relief. We discussed the benefits and alternatives to the procedure. They understand no guarantees can be made about the outcome and they would like to proceed. I discussed the pre-op process and general recovery timeline with the patient. They understand the need for medical clearance. All questions were answered to her

## 2024-06-20 NOTE — PERIOP NOTE
TRANSFER - OUT REPORT:    Verbal report given to Demetria LANGLEY  on Bita Moreno  being transferred to ortho 125(unit) for routine post-op       Report consisted of patient’s Situation, Background, Assessment and   Recommendations(SBAR).     Information from the following report(s) Surgery Report, Intake/Output, MAR, and Cardiac Rhythm SR  was reviewed with the receiving nurse.    Opportunity for questions and clarification was provided.      Patient transported with:   Tech    Lines:      This SmartLink retrieves the last documented value for LDA assessment data, retrieved by either LDA type or by LDA group ID.     This SmartLink should be used in a SmartText or SmartPhrase. If one is not available, please contact your .

## 2024-06-20 NOTE — PROGRESS NOTES
DISCHARGE NOTE FROM ORTHO NURSE    Patient determined to be stable for discharge by attending provider. I have reviewed the discharge instructions with the patient and daughter. They verbalized understanding and all questions were answered to their satisfaction. No complaints or further questions were expressed.      Medications sent to pharmacy. Appropriate educational materials and medication side effect teaching were provided.      PIV were removed prior to discharge.     Patient did not discharge with any line, swanson, or drain.    Personal items and valuables accounted for at discharge by patient and/or family: Yes    Post-op patient: Yes-Patient given post-op discharge kit and instructed on use.    Demetria Argueta RN   
Ortho / Neurosurgery NP Note    POD# 0  s/p RIGHT TOTAL HIP ARTHROPLASTY ANTERIOR APPROACH   Pt seen with visitor present    Patient in bed  Reports expected mild pain. Given Tramadol in PACU  Tolerating clears. Denies nausea  Able to void in PACU  Would like to DC home today if able     VSS Afebrile.    Visit Vitals  BP (!) 149/83   Pulse 82   Temp 97.6 °F (36.4 °C) (Oral)   Resp 21   Ht 1.651 m (5' 5\")   Wt 86.7 kg (191 lb 2.2 oz)   SpO2 95%   BMI 31.81 kg/m²       Voiding status: voiding            Labs    Lab Results   Component Value Date/Time    HGB 11.9 06/11/2024 10:27 AM      Lab Results   Component Value Date/Time    INR 1.0 06/11/2024 11:54 AM      Lab Results   Component Value Date/Time     06/11/2024 11:54 AM    K 4.1 06/11/2024 11:54 AM     06/11/2024 11:54 AM    CO2 27 06/11/2024 11:54 AM    BUN 21 06/11/2024 11:54 AM     Recent Glucose Results:   Glucose   Date Value Ref Range Status   06/11/2024 93 65 - 100 mg/dL Final   05/31/2024 92 65 - 100 mg/dL Final   03/25/2024 91 65 - 100 mg/dL Final           Body mass index is 31.81 kg/m². : A BMI > 30 is classified as obesity and > 40 is classified as morbid obesity.     Awake and alert. No acute distress.    Dressing: Silver Dressing C.D.I.   No significant erythema or swelling  Cryotherapy in place over incision.   BLE sensation to light touch intact  BLE motor intact. Strength 5/5    SCD for mechanical DVT proph while in bed        PLAN:  1) PT BID - WBAT.   2) DVT Prophylaxis: Aspirin 81 mg BID for DVT Prophylaxis   3) GI Prophylaxis - Pepcid   4) Pain control - scheduled tylenol  and toradol, and prn  tramadol  . Will restart PTA meloxicam once discharged.   5) Readiness for discharge:     [x] Vital Signs stable    [] Labs stable    [x] + Voiding    [x] Wound intact, drainage minimal    [x] Tolerating PO intake     [] Cleared by PT (OT if applicable) for discharge   [x] Adequate pain control on oral medication alone        Discharge Plan: 
                                                                                                                                                                                                                                                                                                                                                                                                       Pain Rating:  Did not rate pain/10   Pain Intervention(s):   ice, rest, and repositioning    Activity Tolerance:   Good    After treatment:   Patient left in no apparent distress in bed, Call bell within reach, Caregiver / family present, and nurse informed    COMMUNICATION/EDUCATION:   The patient's plan of care was discussed with: physical therapist and registered nurse    Patient Education  Education Provided: Role of Therapy;Plan of Care;Home Exercise Program;Precautions;ADL Adaptive Strategies;Transfer Training;Family Education;Equipment;Fall Prevention Strategies;Mobility Training  Education Method: Demonstration;Verbal;Teach Back  Barriers to Learning: None  Education Outcome: Verbalized understanding    Thank you for this referral.  Emily Reyes OTR/L  Minutes: 38    Occupational Therapy Evaluation Charge Determination   History Examination Decision-Making   HIGH Complexity : Extensive review of history including physical, cognitive and psychocial history  HIGH Complexity: 5 Performance deficits relating to physical, cognitive, or psychosocial skills that result in activity limitations and/or participation restrictions  MEDIUM Complexity: Patient may present with comorbidities that affect occupational performance. Minimal to moderate modifications of tasks or assist (eg. physical or verbal) with assist is necessary to enable pt to complete eval   Based on the above components, the patient evaluation is determined to be of the following complexity level: High

## 2024-08-23 ENCOUNTER — ANCILLARY PROCEDURE (OUTPATIENT)
Age: 74
End: 2024-08-23
Payer: MEDICARE

## 2024-08-23 ENCOUNTER — OFFICE VISIT (OUTPATIENT)
Age: 74
End: 2024-08-23
Payer: MEDICARE

## 2024-08-23 VITALS
BODY MASS INDEX: 32.26 KG/M2 | SYSTOLIC BLOOD PRESSURE: 146 MMHG | OXYGEN SATURATION: 97 % | WEIGHT: 193.6 LBS | DIASTOLIC BLOOD PRESSURE: 82 MMHG | HEART RATE: 74 BPM | HEIGHT: 65 IN

## 2024-08-23 DIAGNOSIS — Z82.49 FAMILY HISTORY OF ISCHEMIC HEART DISEASE AND OTHER DISEASES OF THE CIRCULATORY SYSTEM: ICD-10-CM

## 2024-08-23 DIAGNOSIS — R00.2 PALPITATIONS: Primary | ICD-10-CM

## 2024-08-23 DIAGNOSIS — R00.2 PALPITATIONS: ICD-10-CM

## 2024-08-23 DIAGNOSIS — I49.3 VENTRICULAR PREMATURE DEPOLARIZATION: ICD-10-CM

## 2024-08-23 DIAGNOSIS — I10 ESSENTIAL (PRIMARY) HYPERTENSION: ICD-10-CM

## 2024-08-23 PROCEDURE — 93246 EXT ECG>7D<15D RECORDING: CPT | Performed by: INTERNAL MEDICINE

## 2024-08-23 PROCEDURE — 1123F ACP DISCUSS/DSCN MKR DOCD: CPT | Performed by: SPECIALIST

## 2024-08-23 PROCEDURE — 99214 OFFICE O/P EST MOD 30 MIN: CPT | Performed by: SPECIALIST

## 2024-08-23 PROCEDURE — 3079F DIAST BP 80-89 MM HG: CPT | Performed by: SPECIALIST

## 2024-08-23 PROCEDURE — 3077F SYST BP >= 140 MM HG: CPT | Performed by: SPECIALIST

## 2024-08-23 ASSESSMENT — PATIENT HEALTH QUESTIONNAIRE - PHQ9
SUM OF ALL RESPONSES TO PHQ9 QUESTIONS 1 & 2: 0
SUM OF ALL RESPONSES TO PHQ QUESTIONS 1-9: 0
SUM OF ALL RESPONSES TO PHQ QUESTIONS 1-9: 0
1. LITTLE INTEREST OR PLEASURE IN DOING THINGS: NOT AT ALL
SUM OF ALL RESPONSES TO PHQ QUESTIONS 1-9: 0
2. FEELING DOWN, DEPRESSED OR HOPELESS: NOT AT ALL
SUM OF ALL RESPONSES TO PHQ QUESTIONS 1-9: 0

## 2024-08-23 NOTE — PROGRESS NOTES
Colon CA    Hypertension Father     Heart Disease Father         heart attack age 49, smoker    Hypertension Sister     Colon Cancer Sister         Colon CA    Hypertension Brother     Heart Disease Brother     Bone Cancer Brother         Multiple myeloma    Hypertension Brother     Hypertension Brother     Cancer Brother         Esophageal    Hypertension Brother        Past Medical History:   Diagnosis Date    Hypertension     Left atrial enlargement     Osteoarthritis     Spinal stenosis     Thyroid nodule     annual eval with US    Venous insufficiency        Specialty Problems          Cardiology Problems    Hypertension        Left atrial enlargement        Venous insufficiency        Pain of breast            GENERAL ROS:  A comprehensive review of systems was negative except for what was noted in the HPI.  BP (!) 146/82 (Site: Left Upper Arm, Position: Sitting, Cuff Size: Medium Adult)   Pulse 74   Ht 1.651 m (5' 5\")   Wt 87.8 kg (193 lb 9.6 oz)   LMP  (LMP Unknown)   SpO2 97%   BMI 32.22 kg/m²     Wt Readings from Last 3 Encounters:   08/23/24 87.8 kg (193 lb 9.6 oz)   06/20/24 86.7 kg (191 lb 2.2 oz)   06/11/24 87.7 kg (193 lb 5.5 oz)            BP Readings from Last 3 Encounters:   08/23/24 (!) 146/82   06/20/24 (!) 154/84   06/11/24 (!) 157/81       PHYSICAL EXAM  General appearance: alert, appears stated age, and cooperative  Neck: no adenopathy, no carotid bruit, no JVD, and supple, symmetrical, trachea midline  Lungs: clear to auscultation bilaterally  Heart: regular rate and rhythm, S1, S2 normal, no murmur, click, rub or gallop  Extremities: edema 1+ compression stockings in place      Lab Results   Component Value Date    CHOL 188 07/25/2023    TRIG 66 07/25/2023    HDL 95 07/25/2023    VLDL 13.2 07/25/2023    CHOLHDLRATIO 2.0 07/25/2023      Lab Results   Component Value Date     06/11/2024    K 4.1 06/11/2024     06/11/2024    CO2 27 06/11/2024    BUN 21 (H) 06/11/2024

## 2024-09-03 ENCOUNTER — OFFICE VISIT (OUTPATIENT)
Facility: CLINIC | Age: 74
End: 2024-09-03
Payer: MEDICARE

## 2024-09-03 VITALS
WEIGHT: 196 LBS | HEIGHT: 65 IN | HEART RATE: 82 BPM | DIASTOLIC BLOOD PRESSURE: 83 MMHG | BODY MASS INDEX: 32.65 KG/M2 | SYSTOLIC BLOOD PRESSURE: 161 MMHG | TEMPERATURE: 98.4 F | OXYGEN SATURATION: 100 % | RESPIRATION RATE: 16 BRPM

## 2024-09-03 DIAGNOSIS — G89.29 CHRONIC MIDLINE LOW BACK PAIN WITHOUT SCIATICA: ICD-10-CM

## 2024-09-03 DIAGNOSIS — I10 PRIMARY HYPERTENSION: ICD-10-CM

## 2024-09-03 DIAGNOSIS — M54.50 CHRONIC MIDLINE LOW BACK PAIN WITHOUT SCIATICA: ICD-10-CM

## 2024-09-03 DIAGNOSIS — R79.89 PRERENAL AZOTEMIA: Primary | ICD-10-CM

## 2024-09-03 DIAGNOSIS — E66.09 CLASS 1 OBESITY DUE TO EXCESS CALORIES WITHOUT SERIOUS COMORBIDITY WITH BODY MASS INDEX (BMI) OF 32.0 TO 32.9 IN ADULT: ICD-10-CM

## 2024-09-03 DIAGNOSIS — F41.9 ANXIETY: ICD-10-CM

## 2024-09-03 PROCEDURE — 3077F SYST BP >= 140 MM HG: CPT | Performed by: INTERNAL MEDICINE

## 2024-09-03 PROCEDURE — 1123F ACP DISCUSS/DSCN MKR DOCD: CPT | Performed by: INTERNAL MEDICINE

## 2024-09-03 PROCEDURE — 3079F DIAST BP 80-89 MM HG: CPT | Performed by: INTERNAL MEDICINE

## 2024-09-03 PROCEDURE — 99214 OFFICE O/P EST MOD 30 MIN: CPT | Performed by: INTERNAL MEDICINE

## 2024-09-03 SDOH — ECONOMIC STABILITY: FOOD INSECURITY: WITHIN THE PAST 12 MONTHS, THE FOOD YOU BOUGHT JUST DIDN'T LAST AND YOU DIDN'T HAVE MONEY TO GET MORE.: NEVER TRUE

## 2024-09-03 SDOH — ECONOMIC STABILITY: FOOD INSECURITY: WITHIN THE PAST 12 MONTHS, YOU WORRIED THAT YOUR FOOD WOULD RUN OUT BEFORE YOU GOT MONEY TO BUY MORE.: NEVER TRUE

## 2024-09-03 SDOH — ECONOMIC STABILITY: INCOME INSECURITY: HOW HARD IS IT FOR YOU TO PAY FOR THE VERY BASICS LIKE FOOD, HOUSING, MEDICAL CARE, AND HEATING?: NOT HARD AT ALL

## 2024-09-03 ASSESSMENT — ANXIETY QUESTIONNAIRES
5. BEING SO RESTLESS THAT IT IS HARD TO SIT STILL: NOT AT ALL
6. BECOMING EASILY ANNOYED OR IRRITABLE: NOT AT ALL
4. TROUBLE RELAXING: NOT AT ALL
IF YOU CHECKED OFF ANY PROBLEMS ON THIS QUESTIONNAIRE, HOW DIFFICULT HAVE THESE PROBLEMS MADE IT FOR YOU TO DO YOUR WORK, TAKE CARE OF THINGS AT HOME, OR GET ALONG WITH OTHER PEOPLE: NOT DIFFICULT AT ALL
2. NOT BEING ABLE TO STOP OR CONTROL WORRYING: NOT AT ALL
7. FEELING AFRAID AS IF SOMETHING AWFUL MIGHT HAPPEN: NOT AT ALL
1. FEELING NERVOUS, ANXIOUS, OR ON EDGE: NOT AT ALL
GAD7 TOTAL SCORE: 0
3. WORRYING TOO MUCH ABOUT DIFFERENT THINGS: NOT AT ALL

## 2024-09-03 ASSESSMENT — PATIENT HEALTH QUESTIONNAIRE - PHQ9
SUM OF ALL RESPONSES TO PHQ QUESTIONS 1-9: 0
2. FEELING DOWN, DEPRESSED OR HOPELESS: NOT AT ALL
SUM OF ALL RESPONSES TO PHQ QUESTIONS 1-9: 0
SUM OF ALL RESPONSES TO PHQ QUESTIONS 1-9: 0
SUM OF ALL RESPONSES TO PHQ9 QUESTIONS 1 & 2: 0
1. LITTLE INTEREST OR PLEASURE IN DOING THINGS: NOT AT ALL
SUM OF ALL RESPONSES TO PHQ QUESTIONS 1-9: 0

## 2024-09-04 LAB
ANION GAP SERPL CALC-SCNC: 3 MMOL/L (ref 5–15)
BUN SERPL-MCNC: 18 MG/DL (ref 6–20)
BUN/CREAT SERPL: 18 (ref 12–20)
CALCIUM SERPL-MCNC: 9.7 MG/DL (ref 8.5–10.1)
CHLORIDE SERPL-SCNC: 107 MMOL/L (ref 97–108)
CO2 SERPL-SCNC: 30 MMOL/L (ref 21–32)
CREAT SERPL-MCNC: 0.99 MG/DL (ref 0.55–1.02)
GLUCOSE SERPL-MCNC: 91 MG/DL (ref 65–100)
POTASSIUM SERPL-SCNC: 3.8 MMOL/L (ref 3.5–5.1)
SODIUM SERPL-SCNC: 140 MMOL/L (ref 136–145)

## 2024-09-15 LAB — ECHO BSA: 2.01 M2

## 2024-09-15 PROCEDURE — 93248 EXT ECG>7D<15D REV&INTERPJ: CPT | Performed by: INTERNAL MEDICINE

## 2024-09-17 ENCOUNTER — TELEPHONE (OUTPATIENT)
Age: 74
End: 2024-09-17

## 2024-09-17 DIAGNOSIS — R00.2 PALPITATIONS: Primary | ICD-10-CM

## 2024-09-17 DIAGNOSIS — R00.0 RAPID HEART BEAT: ICD-10-CM

## 2024-09-17 RX ORDER — METOPROLOL SUCCINATE 25 MG/1
25 TABLET, EXTENDED RELEASE ORAL DAILY
Qty: 30 TABLET | Refills: 3 | Status: SHIPPED | OUTPATIENT
Start: 2024-09-17

## 2024-09-17 RX ORDER — TRIAMTERENE/HYDROCHLOROTHIAZID 37.5-25 MG
1 TABLET ORAL DAILY
COMMUNITY
Start: 2024-09-04

## 2024-10-04 ENCOUNTER — OFFICE VISIT (OUTPATIENT)
Age: 74
End: 2024-10-04
Payer: MEDICARE

## 2024-10-04 VITALS
BODY MASS INDEX: 31.75 KG/M2 | OXYGEN SATURATION: 98 % | HEART RATE: 82 BPM | HEIGHT: 65 IN | DIASTOLIC BLOOD PRESSURE: 84 MMHG | WEIGHT: 190.6 LBS | SYSTOLIC BLOOD PRESSURE: 146 MMHG

## 2024-10-04 DIAGNOSIS — I49.3 VENTRICULAR PREMATURE DEPOLARIZATION: ICD-10-CM

## 2024-10-04 DIAGNOSIS — R00.2 PALPITATIONS: Primary | ICD-10-CM

## 2024-10-04 DIAGNOSIS — I87.2 VENOUS INSUFFICIENCY (CHRONIC) (PERIPHERAL): ICD-10-CM

## 2024-10-04 DIAGNOSIS — I47.10 SVT (SUPRAVENTRICULAR TACHYCARDIA) (HCC): ICD-10-CM

## 2024-10-04 DIAGNOSIS — I10 ESSENTIAL (PRIMARY) HYPERTENSION: ICD-10-CM

## 2024-10-04 PROCEDURE — 3079F DIAST BP 80-89 MM HG: CPT | Performed by: SPECIALIST

## 2024-10-04 PROCEDURE — 99214 OFFICE O/P EST MOD 30 MIN: CPT | Performed by: SPECIALIST

## 2024-10-04 PROCEDURE — 1123F ACP DISCUSS/DSCN MKR DOCD: CPT | Performed by: SPECIALIST

## 2024-10-04 PROCEDURE — 3077F SYST BP >= 140 MM HG: CPT | Performed by: SPECIALIST

## 2024-10-04 ASSESSMENT — PATIENT HEALTH QUESTIONNAIRE - PHQ9
1. LITTLE INTEREST OR PLEASURE IN DOING THINGS: NOT AT ALL
SUM OF ALL RESPONSES TO PHQ QUESTIONS 1-9: 0
2. FEELING DOWN, DEPRESSED OR HOPELESS: NOT AT ALL
SUM OF ALL RESPONSES TO PHQ QUESTIONS 1-9: 0
SUM OF ALL RESPONSES TO PHQ9 QUESTIONS 1 & 2: 0

## 2024-10-04 NOTE — PROGRESS NOTES
(peripheral)  5. SVT (supraventricular tachycardia) (HCC)       Future Appointments   Date Time Provider Department Center   10/4/2024  1:40 PM Remigio Irene III, MD [facility]   11/7/2024 10:45 AM Kameron García MD [facility]        Remigio Irene MD  10/4/2024  Please note that this dictation was completed with [employer], the computer voice recognition software.  Quite often unanticipated grammatical, syntax, homophones, and other interpretive errors are inadvertently transcribed by the computer software.  Please disregard these errors.  Please excuse any errors that have escaped final proofreading.  Thank you.

## 2024-11-20 NOTE — MR AVS SNAPSHOT
POSTPARTUM ROUNDING NOTE -  DELIVERY    Subjective   Pt is postpartum day 2.  The patient feels well, however voices frustration with difficulty breastfeeding. Now supplementing formula for baby.  Denies fever/chills, chest pain, shortness of breath, lightheadedness, and dizziness.   Pain is well controlled with current medications.   Tolerating regular diet. +flatus, +BM. Lochia appropriate.  Voiding freely.  Ambulating without difficulty.    Objective   Patient Vitals for the past 8 hrs:   BP Temp Temp src Pulse Resp   24 0152 116/71 97.5 °F (36.4 °C) Oral 65 16   24 2132 115/75 97.9 °F (36.6 °C) Oral 70 16       General: well developed, well nourished. No acute distress.  Resp: Lungs CTAB. Respirations are unlabored, no accessory muscle usage. No respiratory distress.  CV: RRR. Normal peripheral perfusion.  Abdomen: Soft, appropriately tender. Fundus firm and below umbilicus, incision c/d/i with Dermabond over incision  Neurological: A&O x3.  Skin: Warm, dry, normal for ethnicity.     Assessment and Plan   Donna Duarte is a 35 year old  on post partum day 2 s/p  section for h/o myomectomy.     - GI: Tolerating regular diet  - : Voiding freely  - Pain: Controlled  - Heme: antepartum hgb 12.0 -> pp hgb 9.4  - ID: VSS, afebrile  - Encourage ambulation     - Continue routine postpartum care  - A Rh Positive, Rhogam not indicated  - Rubella: Immune  - Varicella: Immune  - TDaP UTD, influenza UTD, RSV UTD     #Fibroid uterus     #Anxiety  - sertraline 50mg qd     #GERD  - daily omeprazole; on pantoprazole while inpatient     Dispo: Dr. Renzo Joya MD, PGY-1  Obstetrics & Gynecology   Visit Information Date & Time Provider Department Dept. Phone Encounter #  
 12/5/2017  3:45 PM Unique Balbuena MD [facility] Internal Medicine [phone] [id number] Follow-up Instructions Return in about 8 weeks (around 1/30/2018) for Follow-up, Hypertension and Respiratory Infection. Upcoming Health Maintenance Date Due  
 GLAUCOMA SCREENING Q2Y 11/12/2017 MEDICARE YEARLY EXAM 1/19/2018 Pneumococcal 65+ Low/Medium Risk (2 of 2 - PPSV23) 5/17/2018 BREAST CANCER SCRN MAMMOGRAM 9/25/2019 COLONOSCOPY 3/30/2021 DTaP/Tdap/Td series (2 - Td) 4/5/2026 Allergies as of 12/5/2017  Review Complete On: 12/5/2017 By: Unique Balbuena MD  
 No Known Allergies Current Immunizations  Never Reviewed Name Date Influenza High Dose Vaccine PF 10/13/2015 Tdap 4/5/2016 10:30 AM  
  
 Not reviewed this visit You Were Diagnosed With   
  
 Codes Comments Other fatigue    -  Primary ICD-10-CM: R53.83 ICD-9-CM: 780.79 Pneumonia due to infectious organism, unspecified laterality, unspecified part of lung     ICD-10-CM: J18.9 ICD-9-CM: 136.9, 484.8 Bronchitis     ICD-10-CM: J40 ICD-9-CM: 162 Vitals BP Pulse Temp Resp Height(growth percentile) Weight(growth percentile) 120/70 (BP 1 Location: Right arm, BP Patient Position: Sitting) 83 98 °F (36.7 °C) (Oral) 20 5' 4\" (1.626 m) 204 lb (92.5 kg) SpO2 BMI OB Status Smoking Status 99% 35.02 kg/m2 Postmenopausal Never Smoker Vitals History BMI and BSA Data Body Mass Index Body Surface Area 35.02 kg/m 2 2.04 m 2 Preferred Pharmacy Pharmacy Name Phone [id number]- [place], [place] - [address] [phone] Your Updated Medication List  
  
   
This list is accurate as of: 12/5/17  4:15 PM.  Always use your most recent med list.  
  
  
  
  
 CALCIUM + D PO Take  by mouth daily. meloxicam 15 mg tablet Commonly known as:  MOBIC  
TAKE 1 TABLET (15 MG TOTAL) BY MOUTH DAILY. multivitamin tablet Commonly known as:  ONE A DAY Take 1 Tab by mouth daily. omega-3 fatty acids-vitamin e 1,000 mg Cap Take 1 Cap by mouth. OTHER \"tumeric\"  
  
 triamterene-hydroCHLOROthiazide 37.5-25 mg per tablet Commonly known as:  Hung Payton TAKE 1 TABLET BY MOUTH TWICE A DAY Follow-up Instructions Return in about 8 weeks (around 1/30/2018) for Follow-up, Hypertension and Respiratory Infection. Patient Instructions It was a pleasure to see you! As discussed: You need rest.  
Schedule face to face for your . Stay well hydrated. Sleep at least 7-9hrs/ night When should you call for help? Call 911 anytime you think you may need emergency care. For example, call if: 
· You have severe trouble breathing. Call your doctor now or seek immediate medical care if: 
· You have new or worse trouble breathing. · You cough up dark brown or bloody mucus (sputum). · You have a new or higher fever. · You have a new rash. Watch closely for changes in your health, and be sure to contact your doctor if: 
· You cough more deeply or more often, especially if you notice more mucus or a change in the color of your mucus. · You are not getting better as expected. Fatigue: Care Instructions Your Care Instructions Fatigue is a feeling of tiredness, exhaustion, or lack of energy. You may feel fatigue because of too much or not enough activity. It can also come from stress, lack of sleep, boredom, and poor diet. Many medical problems, such as viral infections, can cause fatigue. Emotional problems, especially depression, are often the cause of fatigue. Fatigue is most often a symptom of another problem. Treatment for fatigue depends on the cause.  For example, if you have fatigue because you have a certain health problem, treating this problem also treats your fatigue. If depression or anxiety is the cause, treatment may help. Follow-up care is a key part of your treatment and safety. Be sure to make and go to all appointments, and call your doctor if you are having problems. It's also a good idea to know your test results and keep a list of the medicines you take. How can you care for yourself at home? · Get regular exercise. But don't overdo it. Go back and forth between rest and exercise. · Get plenty of rest. 
· Eat a healthy diet. Do not skip meals, especially breakfast. 
· Reduce your use of caffeine, tobacco, and alcohol. Caffeine is most often found in coffee, tea, cola drinks, and chocolate. · Limit medicines that can cause fatigue. This includes tranquilizers and cold and allergy medicines. When should you call for help? Watch closely for changes in your health, and be sure to contact your doctor if: 
? · You have new symptoms such as fever or a rash. ? · Your fatigue gets worse. ? · You have been feeling down, depressed, or hopeless. Or you may have lost interest in things that you usually enjoy. ? · You are not getting better as expected. Where can you learn more? Go to http://roman-kimberly.info/. Enter O415 in the search box to learn more about \"Fatigue: Care Instructions. \" Current as of: March 20, 2017 Content Version: 11.4 © 5062-1565 [employer]. Care instructions adapted under license by [employer] (which disclaims liability or warranty for this information). If you have questions about a medical condition or this instruction, always ask your healthcare professional. [place] [address] any warranty or liability for your use of this information. Introducing [facility]! Dear Sherren Laud: Thank you for requesting a [employer] account.   Our records indicate that you already have an active [employer] account. You can access your account anytime at [email or web address] Did you know that you can access your hospital and ER discharge instructions at any time in [employer]? You can also review all of your test results from your hospital stay or ER visit. Additional Information If you have questions, please visit the Frequently Asked Questions section of the [employer] website at [email or web address] Remember, [employer] is NOT to be used for urgent needs. For medical emergencies, dial 911. Now available from your iPhone and Android! Please provide this summary of care documentation to your next provider. Your primary care clinician is listed as Andrew Deleon. If you have any questions after today's visit, please call [phone].

## 2024-12-04 RX ORDER — TRIAMTERENE AND HYDROCHLOROTHIAZIDE 37.5; 25 MG/1; MG/1
1 TABLET ORAL DAILY
Qty: 90 TABLET | Refills: 3 | Status: SHIPPED | OUTPATIENT
Start: 2024-12-04

## 2024-12-23 NOTE — PROGRESS NOTES
[facility]  Joint/Spine Preoperative Instructions        Surgery Date 06/20/24          Time of Arrival to be called on 06/19/24 after 2 pm  Contact: [phone]   1. On the day of your surgery, please report to the Surgical Services Registration Desk and sign in at your designated time. The Surgery Center is located to the right of the Emergency Room.     2. You must have someone with you to drive you home. You should not drive a car for 24 hours following surgery. Please make arrangements for a friend or family member to stay with you for the first 24 hours after your surgery.    3. No food after midnight 06/19/24.  Medications morning of surgery should be taken with a sip of water.  Please follow pre-surgery drink instructions that were given at your Pre Admission Testing appointment.      4. We recommend you do not drink any alcoholic beverages for 24 hours before and after your surgery.    5. Contact your surgeon’s office for instructions on the following medications: non-steroidal anti-inflammatory drugs (i.e. Advil, Aleve), vitamins, and supplements. (Some surgeon’s will want you to stop these medications prior to surgery and others may allow you to take them)  **If you are currently taking Plavix, Coumadin, Aspirin and/or other blood-thinning agents, contact your surgeon for instructions.** Your surgeon will partner with the physician prescribing these medications to determine if it is safe to stop or if you need to continue taking.  Please do not stop taking these medications without instructions from your surgeon    6. Wear comfortable clothes.  Wear glasses instead of contacts.  Do not bring any money or jewelry. Please bring picture ID, insurance card, and any prearranged co-payment or hospital payment.  Do not wear make-up, particularly mascara the morning of your surgery.  Do not wear nail polish, particularly if you are having foot /hand surgery.  Wear your hair 
Incentive Spirometer        Using the incentive spirometer helps expand the small air sacs of your lungs, helps you breathe deeply, and helps improve your lung function.  Use your incentive spirometer twice a day (10 breaths each time) prior to surgery.      How to Use Your Incentive Spirometer:  Hold the incentive spirometer in an upright position.   Breathe out as usual.   Place the mouthpiece in your mouth and seal your lips tightly around it.   Take a deep breath.  Breathe in slowly and as deeply as possible. Keep the blue flow rate guide between the arrows.   Hold your breath as long as possible. Then exhale slowly and allow the piston to fall to the bottom of the column.   Rest for a few seconds and repeat steps one through five at least 10 times.     PAT Tidal Volume____2000-2250______________  x________________  Date___6/11/24____________________    BRING THE INCENTIVE SPIROMETER WITH YOU TO THE HOSPITAL ON THE DAY OF YOUR SURGERY.  Opportunity given to ask and answer questions as well as to observe return demonstration.    Patient signature_____________________________    Witness____________________________  
NORA Lu at Dr García's office to have Dr García review labs and addend clearance note for surgery.   
Orthopedic and Spine Patients:  Instructions on When You Can   Eat or Drink Before Surgery      You have been provided 2 pre-surgery drinks received at your pre-admission testing appointment.    Night before surgery:  You should drink one bottle of the  pre-surgery drink at bedtime. No food after midnight!    Day of Surgery:  Complete 2nd bottle of the pre-surgery drink 1 hour prior to arrival at hospital.  For questions call Pre-Admission Testing at [phone].  They are available from 8:00am-5:00pm, Monday through Friday.  
Patient attended the Joint Replacement Education Class at [facility]. The content of the class was presented using a power point presentation specific for patients undergoing hip and knee replacement surgery. The [facility] Joint Replacement Education Handbook was given to the patient.  Preparing for surgery, day of surgery routine and expectations, discharge process and help at home expectations, nutrition,medications, infection control, pain management, DVT prevention, ice therapy and safety were reviewed in class. Opportunity for questions provided, patient verbalized understanding of instructions.    [employer]is and KOOS Questionnaires completed on 6/11/2024.  [employer] access (active/pending/declined) Active.    Prehab completed during PAT visit on 6/11/2024.  Patient reports has RW for after surgery, will bring to hospital on DOS 6/20/2024.    
Pharmacy to Dose Heparin Infusion Note    Albin Andino is a 85 y.o. male receiving heparin infusion.     Therapy for (VTE/Cardiac): cardiac  Patient Weight: 81.5 kg  Initial Bolus (Y/N): Yes  Any Bolus (Y/N): Yes     Signs or Symptoms of Bleeding: none noted    Cardiac or Other (Not VTE)   Initial Bolus: 60 units/kg (Max 4,000 units)  Initial rate: 12 units/kg/hr (Max 1,000 units/hr)   Anti-Xa Bolus   Dose Infusion Hold   Time Infusion Rate Change (units/kg/hr) Repeat  Anti-Xa    < 0.11 50 units/kg  (4000 units Max) None Increase by  3 units/kg/hr 6 hours   0.11- 0.19 25 units/kg  (2000 units Max) None Increase by  2 units/kg/hr 6 hours   0.2 - 0.29 0 None Increase by  1 units/kg/hr 6 hours   0.3 - 0.5 0 None No Change 6 hours (after 2 consecutive levels in range check qAM)   0.51 - 0.6 0 None Decrease by  1 units/kg/hr 6 hours   0.61 - 0.8 0 30 minutes Decrease by  2 units/kg/hr 6 hours   0.81 - 1 0 60 minutes Decrease by  3 units/kg/hr 6 hours   >1 0 Hold  After Anti-Xa less than 0.5 decrease previous rate by  4 units/kg/hr  Every 2 hours until Anti-Xa  less than 0.5 then when infusion restarts in 6 hours       Results from last 7 days   Lab Units 12/22/24 2322 12/22/24  0531 12/21/24 2329   INR   --  1.09  --    HEMOGLOBIN g/dL 13.1  --  13.9   HEMATOCRIT % 38.6  --  41.3   PLATELETS 10*3/mm3 115*  --  98*       Date   Time   Anti-Xa Current Rate (units/kg/hr) Bolus   (units) Rate Change   (units/kg/hr) New Rate (units/kg/hr) Repeat  Anti-Xa Comments /  Pump Check    12/22 0530 0.1 new 4000 12 12 1200 D/w RN, on heparin briefly at OSH, has been off since he arrived here last night.     12/22 1139 0.33 12 -- -- 12 1800 D/w RN   12/22 1803 0.12 12 -- -- 12 0000 Dw RN held for CT contrast for several hours. Resumed on previous dose. Will recheck at 0000 for further adjustment   12/23 0040 0.19 12 2000 +2 14 0600 D/w RN   12/23 0657 0.61 14 -- -2 12 1500 Lachelle [id number]                                             
The [facility] patient handbook provided & reviewed during the patients pre-admission testing (PAT) appointment. An opportunity for questions was provided, patient verbalized understanding.   
                                                                                                                                        Neftali Mathews, [facility]  12/23/2024  08:34 EST            
INTERVENTIONS: (Benefits and precautions of physical therapy have been discussed with the patient.)  Home Exercise Program  TREATMENT PLAN EFFECTIVE DATES: 6/11/2024 TO 6/11/2024  FREQUENCY/DURATION: Patient to continue to perform home exercise program at least twice daily until her surgery.     SUBJECTIVE:     Patient stated “My hips hurts with almost everything.”    OBJECTIVE DATA SUMMARY:   HISTORY:    Past Medical History:   Diagnosis Date    Hypertension     Osteoarthritis     Spinal stenosis     Thyroid nodule     annual eval with US     Past Surgical History:   Procedure Laterality Date    COLONOSCOPY      LAPAROSCOPY      exploratory, nothing excised or discovered    MOUTH SURGERY      periodontal flap surgery     Prior Level of Function/Home Situation: Ambulates with RW at all times. Indep with ADLs with increased difficulty, is supposed to wear compression socks but cannot get them on even with the sock aide. Increased hip pain with ambulation, bending forward, sit<>down in chair, in/out of bed. Denies hx of falls. Still driving. Still cooking but more trouble with cleaning because of the hip pain.   Personal factors and/or comorbidities impacting plan of care: HTN, OA    Home Situation:  Type of Home: House  Home Layout: One level  Home Access: 4 steps to enter With right handrails  Lives with: alone  (daughter and grandsons will be staying with her)  Home Equipment: straight cane, shower chair, and rolling walker  Bathroom Set up: walk in shower with shower stool and grab bars; grab bar near toilet    EXAMINATION/PRESENTATION/DECISION MAKING:     ADLs (Current Functional Status):   Bathing/Showering:   [x] Independent  [] Requires Assistance from Someone  [] Sponge Bath Only   Ambulation:  [x] Independent  [x] Walk Indoors Only  [] Walk Outdoors  [x] Use Assistive Device  [] Use Wheelchair Only     Dressing:  [x] Independent    Requires Assistance from Someone for:  [] Sock/Shoes  [] Pants  [] 
surgery:  Shower again thoroughly with an antibacterial soap, such as Dial or the soap provided at your preassessment appointment. If needed, ask someone for help to reach all areas of your body. Don’t forget to clean your belly button! Rinse.  With bottle of Hibiclens/Chlorhexidine in hand, turn water off.  Apply Hibiclens antiseptic skin cleanser with a clean, freshly washed washcloth.  Gently apply to your body from chin to toes (except the genital area) and especially the area(s) where your incision(s) will be.  Leave Hibiclens/Chlorhexidine on your skin for at least 20 seconds.     CAUTION: If needed, Hibiclens/chlorhexidine may be used to clean the folds of skin of the legs (such as in the area of the groin) and on your buttocks and hips. However, do not use Hibiclens/Chlorhexidine above the neck or in the genital area (your bottom) or put inside any area of your body.  Turn the water back on and rinse.  Dry gently with a clean, freshly washed towel.  After your shower, do not use any powder, deodorant, perfumes or lotion prior to surgery.  Put on clean, freshly washed clothing.    Tips to help prevent infections after your surgery:  Protect your surgical wound from germs:  Hand washing is the most important thing you and your caregivers can do to prevent infections.  Keep your bandage clean and dry!  Do not touch your surgical wound.  Use clean, freshly washed towels and washcloths every time you shower; do not share bath linens with others.  Until your surgical wound is healed, wear clothing and sleep on bed linens each day that are clean and freshly washed.  Do not allow pets to sleep in your bed with you or touch your surgical wound.  Do not smoke - smoking delays wound healing. This may be a good time to stop smoking.  If you have diabetes, it is important for you to manage your blood sugar levels properly before your surgery as well as after your surgery. Poorly managed blood sugar levels slow down wound 
cells, if present, are separately identified as such.    BACTERIA, URINE 06/11/2024 Negative  NEG /hpf Final    Urine Culture if Indicated 06/11/2024 CULTURE NOT INDICATED BY UA RESULT  CNI   Final    Amorphous Crystal 06/11/2024 FEW (A)  NEG   Final    Hyaline Casts, UA 06/11/2024 0-2  0 - 5 /lpf Final    Crossmatch expiration date 06/11/2024 06/23/2024,2359   Final    ABO/Rh 06/11/2024 A POSITIVE   Final    Antibody Screen 06/11/2024 NEG   Final    Sodium 06/11/2024 139  136 - 145 mmol/L Final    Potassium 06/11/2024 4.1  3.5 - 5.1 mmol/L Final    Chloride 06/11/2024 106  97 - 108 mmol/L Final    CO2 06/11/2024 27  21 - 32 mmol/L Final    Anion Gap 06/11/2024 6  5 - 15 mmol/L Final    Glucose 06/11/2024 93  65 - 100 mg/dL Final    BUN 06/11/2024 21 (H)  6 - 20 MG/DL Final    Creatinine 06/11/2024 0.94  0.55 - 1.02 MG/DL Final    BUN/Creatinine Ratio 06/11/2024 22 (H)  12 - 20   Final    Est, Glom Filt Rate 06/11/2024 64  >60 ml/min/1.73m2 Final    Comment:    Pediatric calculator link: https://www.kidney.org/professionals/kdoqi/gfr_calculatorped     These results are not intended for use in patients <18 years of age.     eGFR results are calculated without a race factor using  the 2021 CKD-EPI equation. Careful clinical correlation is recommended, particularly when comparing to results calculated using previous equations.  The CKD-EPI equation is less accurate in patients with extremes of muscle mass, extra-renal metabolism of creatinine, excessive creatine ingestion, or following therapy that affects renal tubular secretion.      Calcium 06/11/2024 9.6  8.5 - 10.1 MG/DL Final    Total Bilirubin 06/11/2024 0.5  0.2 - 1.0 MG/DL Final    ALT 06/11/2024 21  12 - 78 U/L Final    AST 06/11/2024 17  15 - 37 U/L Final    Alk Phosphatase 06/11/2024 90  45 - 117 U/L Final    Total Protein 06/11/2024 7.5  6.4 - 8.2 g/dL Final    Albumin 06/11/2024 3.4 (L)  3.5 - 5.0 g/dL Final    Globulin 06/11/2024 4.1 (H)  2.0 - 4.0 g/dL

## 2025-01-30 DIAGNOSIS — R00.0 RAPID HEART BEAT: ICD-10-CM

## 2025-01-30 RX ORDER — METOPROLOL SUCCINATE 25 MG/1
25 TABLET, EXTENDED RELEASE ORAL DAILY
Qty: 30 TABLET | Refills: 3 | Status: SHIPPED | OUTPATIENT
Start: 2025-01-30

## 2025-01-30 NOTE — TELEPHONE ENCOUNTER
Requested Prescriptions     Signed Prescriptions Disp Refills    metoprolol succinate (TOPROL XL) 25 MG extended release tablet 30 tablet 3     Sig: TAKE 1 TABLET BY MOUTH EVERY DAY     Authorizing Provider: COY BACA III     Ordering User: RANDALL MAE

## 2025-02-04 ENCOUNTER — OFFICE VISIT (OUTPATIENT)
Age: 75
End: 2025-02-04
Payer: MEDICARE

## 2025-02-04 VITALS
SYSTOLIC BLOOD PRESSURE: 118 MMHG | BODY MASS INDEX: 32.82 KG/M2 | HEART RATE: 76 BPM | HEIGHT: 65 IN | OXYGEN SATURATION: 98 % | WEIGHT: 197 LBS | DIASTOLIC BLOOD PRESSURE: 78 MMHG

## 2025-02-04 DIAGNOSIS — I10 ESSENTIAL (PRIMARY) HYPERTENSION: ICD-10-CM

## 2025-02-04 DIAGNOSIS — R00.2 PALPITATIONS: ICD-10-CM

## 2025-02-04 DIAGNOSIS — I47.10 SVT (SUPRAVENTRICULAR TACHYCARDIA) (HCC): ICD-10-CM

## 2025-02-04 DIAGNOSIS — I87.2 VENOUS INSUFFICIENCY (CHRONIC) (PERIPHERAL): Primary | ICD-10-CM

## 2025-02-04 PROCEDURE — G8417 CALC BMI ABV UP PARAM F/U: HCPCS | Performed by: SPECIALIST

## 2025-02-04 PROCEDURE — 1159F MED LIST DOCD IN RCRD: CPT | Performed by: SPECIALIST

## 2025-02-04 PROCEDURE — 1160F RVW MEDS BY RX/DR IN RCRD: CPT | Performed by: SPECIALIST

## 2025-02-04 PROCEDURE — 3074F SYST BP LT 130 MM HG: CPT | Performed by: SPECIALIST

## 2025-02-04 PROCEDURE — G8427 DOCREV CUR MEDS BY ELIG CLIN: HCPCS | Performed by: SPECIALIST

## 2025-02-04 PROCEDURE — 1036F TOBACCO NON-USER: CPT | Performed by: SPECIALIST

## 2025-02-04 PROCEDURE — 3017F COLORECTAL CA SCREEN DOC REV: CPT | Performed by: SPECIALIST

## 2025-02-04 PROCEDURE — 99214 OFFICE O/P EST MOD 30 MIN: CPT | Performed by: SPECIALIST

## 2025-02-04 PROCEDURE — G8399 PT W/DXA RESULTS DOCUMENT: HCPCS | Performed by: SPECIALIST

## 2025-02-04 PROCEDURE — 3078F DIAST BP <80 MM HG: CPT | Performed by: SPECIALIST

## 2025-02-04 PROCEDURE — 1090F PRES/ABSN URINE INCON ASSESS: CPT | Performed by: SPECIALIST

## 2025-02-04 PROCEDURE — 1123F ACP DISCUSS/DSCN MKR DOCD: CPT | Performed by: SPECIALIST

## 2025-02-04 PROCEDURE — 1126F AMNT PAIN NOTED NONE PRSNT: CPT | Performed by: SPECIALIST

## 2025-02-04 ASSESSMENT — PATIENT HEALTH QUESTIONNAIRE - PHQ9
SUM OF ALL RESPONSES TO PHQ QUESTIONS 1-9: 0
2. FEELING DOWN, DEPRESSED OR HOPELESS: NOT AT ALL
SUM OF ALL RESPONSES TO PHQ QUESTIONS 1-9: 0
SUM OF ALL RESPONSES TO PHQ9 QUESTIONS 1 & 2: 0
1. LITTLE INTEREST OR PLEASURE IN DOING THINGS: NOT AT ALL

## 2025-02-04 NOTE — PROGRESS NOTES
HISTORY OF PRESENT ILLNESS  Bita Moreno is a 74 y.o. female     SUMMARY:   Patient Active Problem List   Diagnosis    Hypertension    Obesity    Pain in both lower extremities    Carpal tunnel syndrome of right wrist    Primary osteoarthritis    Change in bowel habits    Family history of colon cancer    Hx of colonoscopy    Physical deconditioning    Anxiety    Spinal stenosis of lumbar region with neurogenic claudication    Multinodular non-toxic goiter    Neuropathy    Chronic midline low back pain without sciatica    Venous insufficiency    Left atrial enlargement    Palpitations    Primary osteoarthritis of left knee    Prerenal azotemia    Insomnia    Acute pharyngitis    Dysuria    Pain of breast    Primary osteoarthritis of right hip    Spondylolisthesis of lumbar region    Vulvitis    S/P total right hip arthroplasty            CARDIOLOGY STUDIES TO DATE:  3/18 echo normal lvef, mild mr and tr without pul htn  3/18 event monitor, nsr  6/20 rare pacs inconsistent associated with flutter, skipped beats. No sig tachy or aman    8/24 14d holter nsr, less than 1% pvc, pacs, 32 episodes svt, fastest 217bpm, longest 21secs. Symptoms reported were associated with pvc, artifact    Chief Complaint   Patient presents with    Hypertension       HPI :  She is doing great from a cardiac standpoint.  No recent palpitations except sometimes at night when she is really quiet.  Blood pressure is well-controlled unfortunately her weight is up about 7 pounds.  Her most recent renal function was normal in spite of the fact that she likes to take her triamterene to help with her chronic edema.  She has been faithful about wearing her compression stockings.  She is happy that her family is now closer up in [place] so she gets to see them but she is sad because her brother is in hospice and she is really the only family member that checks up on him.    CARDIAC ROS:   negative for chest pain, claudication, dyspnea,

## 2025-02-04 NOTE — PROGRESS NOTES
1. Have you been to the ER, urgent care clinic since your last visit?  Hospitalized since your last visit?No    2. Have you seen or consulted any other health care providers outside of the [facility] since your last visit?  Include any pap smears or colon screening.  [facility], PCP Dr. Albina Bray.

## 2025-02-06 ENCOUNTER — TELEPHONE (OUTPATIENT)
Age: 75
End: 2025-02-06

## 2025-02-06 NOTE — TELEPHONE ENCOUNTER
Cardiac Clearance     Dr. Nicole Landa-[facility]    Myosure hysteroscopic sampling and dilation and curettage /General anesthesia    Papers to be brought to [place]  from [facility] on 02/07/2025 for review .[facility] attached office notes .

## 2025-06-01 DIAGNOSIS — R00.0 RAPID HEART BEAT: ICD-10-CM

## 2025-06-02 RX ORDER — METOPROLOL SUCCINATE 25 MG/1
25 TABLET, EXTENDED RELEASE ORAL DAILY
Qty: 30 TABLET | Refills: 3 | Status: SHIPPED | OUTPATIENT
Start: 2025-06-02

## 2025-06-02 NOTE — TELEPHONE ENCOUNTER
Requested Prescriptions     Signed Prescriptions Disp Refills    metoprolol succinate (TOPROL XL) 25 MG extended release tablet 30 tablet 3     Sig: TAKE 1 TABLET BY MOUTH EVERY DAY     Authorizing Provider: COY IRENE III     Ordering User: JERARDO MENDIOLA      Future Appointments   Date Time Provider Department Center   8/5/2025  1:00 PM [facility]      Per Verbal Order by MD/NP

## 2025-08-05 ENCOUNTER — OFFICE VISIT (OUTPATIENT)
Age: 75
End: 2025-08-05
Payer: MEDICARE

## 2025-08-05 VITALS
DIASTOLIC BLOOD PRESSURE: 88 MMHG | BODY MASS INDEX: 33.12 KG/M2 | HEIGHT: 65 IN | WEIGHT: 198.8 LBS | OXYGEN SATURATION: 96 % | SYSTOLIC BLOOD PRESSURE: 136 MMHG | HEART RATE: 74 BPM

## 2025-08-05 DIAGNOSIS — I49.3 VENTRICULAR PREMATURE DEPOLARIZATION: ICD-10-CM

## 2025-08-05 DIAGNOSIS — I47.10 SVT (SUPRAVENTRICULAR TACHYCARDIA): ICD-10-CM

## 2025-08-05 DIAGNOSIS — R00.2 PALPITATIONS: ICD-10-CM

## 2025-08-05 DIAGNOSIS — R00.0 RAPID HEART BEAT: Primary | ICD-10-CM

## 2025-08-05 DIAGNOSIS — I87.2 VENOUS INSUFFICIENCY (CHRONIC) (PERIPHERAL): ICD-10-CM

## 2025-08-05 DIAGNOSIS — Z82.49 FAMILY HISTORY OF ISCHEMIC HEART DISEASE AND OTHER DISEASES OF THE CIRCULATORY SYSTEM: ICD-10-CM

## 2025-08-05 DIAGNOSIS — I10 ESSENTIAL (PRIMARY) HYPERTENSION: ICD-10-CM

## 2025-08-05 PROCEDURE — 99214 OFFICE O/P EST MOD 30 MIN: CPT | Performed by: SPECIALIST

## 2025-08-05 PROCEDURE — 1160F RVW MEDS BY RX/DR IN RCRD: CPT | Performed by: SPECIALIST

## 2025-08-05 PROCEDURE — G8427 DOCREV CUR MEDS BY ELIG CLIN: HCPCS | Performed by: SPECIALIST

## 2025-08-05 PROCEDURE — 3079F DIAST BP 80-89 MM HG: CPT | Performed by: SPECIALIST

## 2025-08-05 PROCEDURE — 3075F SYST BP GE 130 - 139MM HG: CPT | Performed by: SPECIALIST

## 2025-08-05 PROCEDURE — 1159F MED LIST DOCD IN RCRD: CPT | Performed by: SPECIALIST

## 2025-08-05 PROCEDURE — 1036F TOBACCO NON-USER: CPT | Performed by: SPECIALIST

## 2025-08-05 PROCEDURE — G8417 CALC BMI ABV UP PARAM F/U: HCPCS | Performed by: SPECIALIST

## 2025-08-05 PROCEDURE — 1090F PRES/ABSN URINE INCON ASSESS: CPT | Performed by: SPECIALIST

## 2025-08-05 PROCEDURE — G8399 PT W/DXA RESULTS DOCUMENT: HCPCS | Performed by: SPECIALIST

## 2025-08-05 PROCEDURE — 1126F AMNT PAIN NOTED NONE PRSNT: CPT | Performed by: SPECIALIST

## 2025-08-05 PROCEDURE — 1123F ACP DISCUSS/DSCN MKR DOCD: CPT | Performed by: SPECIALIST

## 2025-08-05 ASSESSMENT — PATIENT HEALTH QUESTIONNAIRE - PHQ9
1. LITTLE INTEREST OR PLEASURE IN DOING THINGS: NOT AT ALL
SUM OF ALL RESPONSES TO PHQ QUESTIONS 1-9: 0
2. FEELING DOWN, DEPRESSED OR HOPELESS: NOT AT ALL
SUM OF ALL RESPONSES TO PHQ QUESTIONS 1-9: 0